# Patient Record
Sex: MALE | Race: WHITE | NOT HISPANIC OR LATINO | Employment: OTHER | URBAN - METROPOLITAN AREA
[De-identification: names, ages, dates, MRNs, and addresses within clinical notes are randomized per-mention and may not be internally consistent; named-entity substitution may affect disease eponyms.]

---

## 2017-01-20 RX ORDER — CLONAZEPAM 0.5 MG/1
TABLET ORAL
Qty: 60 TABLET | Refills: 1 | OUTPATIENT
Start: 2017-01-20 | End: 2017-10-05 | Stop reason: SDUPTHER

## 2017-02-14 RX ORDER — AMLODIPINE BESYLATE 5 MG/1
TABLET ORAL
Qty: 30 TABLET | Refills: 5 | Status: SHIPPED | OUTPATIENT
Start: 2017-02-14 | End: 2017-08-28 | Stop reason: SDUPTHER

## 2017-03-16 RX ORDER — ACLIDINIUM BROMIDE 400 UG/1
POWDER, METERED RESPIRATORY (INHALATION)
Qty: 1 EACH | Refills: 3 | Status: SHIPPED | OUTPATIENT
Start: 2017-03-16 | End: 2017-07-28 | Stop reason: SDUPTHER

## 2017-08-28 RX ORDER — AMLODIPINE BESYLATE 5 MG/1
TABLET ORAL
Qty: 30 TABLET | Refills: 5 | Status: SHIPPED | OUTPATIENT
Start: 2017-08-28 | End: 2018-06-11 | Stop reason: SDUPTHER

## 2017-10-05 RX ORDER — CLONAZEPAM 0.5 MG/1
TABLET ORAL
Qty: 60 TABLET | Refills: 1 | OUTPATIENT
Start: 2017-10-05 | End: 2018-02-21 | Stop reason: SDUPTHER

## 2017-10-11 ENCOUNTER — OFFICE VISIT (OUTPATIENT)
Dept: INTERNAL MEDICINE | Facility: CLINIC | Age: 82
End: 2017-10-11

## 2017-10-11 VITALS
TEMPERATURE: 99.4 F | DIASTOLIC BLOOD PRESSURE: 50 MMHG | OXYGEN SATURATION: 88 % | HEART RATE: 79 BPM | HEIGHT: 69 IN | SYSTOLIC BLOOD PRESSURE: 100 MMHG

## 2017-10-11 DIAGNOSIS — G47.00 INSOMNIA, UNSPECIFIED TYPE: ICD-10-CM

## 2017-10-11 DIAGNOSIS — K57.30 DIVERTICULOSIS OF LARGE INTESTINE WITHOUT HEMORRHAGE: ICD-10-CM

## 2017-10-11 DIAGNOSIS — F41.9 ANXIETY: ICD-10-CM

## 2017-10-11 DIAGNOSIS — I10 ESSENTIAL HYPERTENSION: ICD-10-CM

## 2017-10-11 DIAGNOSIS — N40.0 ENLARGED PROSTATE WITHOUT LOWER URINARY TRACT SYMPTOMS (LUTS): ICD-10-CM

## 2017-10-11 DIAGNOSIS — M15.9 GENERALIZED OSTEOARTHRITIS: ICD-10-CM

## 2017-10-11 DIAGNOSIS — N18.30 CHRONIC KIDNEY DISEASE, STAGE III (MODERATE) (HCC): ICD-10-CM

## 2017-10-11 DIAGNOSIS — J43.8 OTHER EMPHYSEMA (HCC): ICD-10-CM

## 2017-10-11 DIAGNOSIS — J18.9 COMMUNITY ACQUIRED PNEUMONIA OF LEFT LOWER LOBE OF LUNG: ICD-10-CM

## 2017-10-11 DIAGNOSIS — D36.9 TUBULAR ADENOMA: ICD-10-CM

## 2017-10-11 DIAGNOSIS — E78.49 OTHER HYPERLIPIDEMIA: ICD-10-CM

## 2017-10-11 DIAGNOSIS — I25.10 ATHEROSCLEROSIS OF NATIVE CORONARY ARTERY OF NATIVE HEART WITHOUT ANGINA PECTORIS: Primary | ICD-10-CM

## 2017-10-11 PROCEDURE — 99214 OFFICE O/P EST MOD 30 MIN: CPT | Performed by: INTERNAL MEDICINE

## 2017-10-11 RX ORDER — DOXYCYCLINE HYCLATE 100 MG/1
100 CAPSULE ORAL 2 TIMES DAILY
Qty: 14 CAPSULE | Refills: 0 | Status: SHIPPED | OUTPATIENT
Start: 2017-10-11 | End: 2018-01-04

## 2017-10-11 NOTE — PROGRESS NOTES
Patient is a 81 y.o. male who is here for cough and SOB.  Chief Complaint   Patient presents with   • Cough   • Shortness of Breath         HPI:  Patient c/o 2 day hx of cough and congestion.  Some yellow expectoration.  Feels sob.  No pleuritic pain.  No sore throat.  No recent antibiotics.  Some nausea.  No diarrhea.  No HA's or ear pains.    History:    Patient Active Problem List   Diagnosis   • Anxiety   • Aortic aneurysm   • Atherosclerotic heart disease of native coronary artery without angina pectoris   • Chronic obstructive pulmonary disease   • Chronic kidney disease, stage III (moderate)   • Diverticulosis of large intestine   • Enlarged prostate without lower urinary tract symptoms (luts)   • Generalized osteoarthritis   • Hyperlipidemia   • Hypertension   • Insomnia   • Neuropathy   • PVD (peripheral vascular disease)   • Tubular adenoma   • Absence of kidney   • Arthralgia of hip       Past Medical History:   Diagnosis Date   • Colon polyps    • Ecchymosis    • Heart attack        Past Surgical History:   Procedure Laterality Date   • ROTATOR CUFF REPAIR Right 2008   • TONSILLECTOMY         Current Outpatient Prescriptions on File Prior to Visit   Medication Sig   • aclidinium bromide (TUDORZA PRESSAIR) 400 MCG/ACT aerosol powder  powder for inhalation Inhale 1 puff 2 (Two) Times a Day.   • amLODIPine (NORVASC) 5 MG tablet take 1 tablet by mouth once daily   • aspirin 81 MG tablet Take  by mouth Daily.   • carvedilol (COREG) 12.5 MG tablet 2 (Two) Times a Day.   • clonazePAM (KlonoPIN) 0.5 MG tablet 1-2 po qhs prn   • CRESTOR 20 MG tablet Every Night.   • DULoxetine (CYMBALTA) 60 MG capsule Daily.   • dutasteride (AVODART) 0.5 MG capsule Daily.   • fluticasone (FLONASE) 50 MCG/ACT nasal spray instill 1 spray into each nostril twice a day   • paricalcitol (ZEMPLAR) 1 MCG capsule    • sulfaSALAzine (AZULFIDINE) 500 MG tablet 2 (Two) Times a Day.   • tamsulosin (FLOMAX) 0.4 MG capsule 24 hr capsule 2  (Two) Times a Day.   • zolpidem CR (AMBIEN CR) 12.5 MG CR tablet Daily.     No current facility-administered medications on file prior to visit.        Family History   Problem Relation Age of Onset   • Cancer Other    • Heart attack Other    • Hypertension Other    • Hyperlipidemia Other        Social History     Social History   • Marital status:      Spouse name: N/A   • Number of children: N/A   • Years of education: N/A     Occupational History   • Not on file.     Social History Main Topics   • Smoking status: Former Smoker   • Smokeless tobacco: Not on file   • Alcohol use Not on file   • Drug use: Not on file   • Sexual activity: Not on file     Other Topics Concern   • Not on file     Social History Narrative         ROS:    Review of Systems   Constitutional: Positive for fatigue, fever and unexpected weight change. Negative for chills and diaphoresis.   HENT: Negative for congestion, ear pain, hearing loss, nosebleeds, postnasal drip, sinus pressure and sore throat.    Eyes: Negative for pain, discharge and itching.   Respiratory: Positive for cough and shortness of breath. Negative for chest tightness and wheezing.    Cardiovascular: Negative for chest pain, palpitations and leg swelling.   Gastrointestinal: Negative for abdominal distention, abdominal pain, blood in stool, constipation, diarrhea, nausea and vomiting.        5/15 colonoscopy with TA   Endocrine: Negative for polydipsia and polyuria.   Genitourinary: Negative for difficulty urinating, dysuria, frequency and hematuria.        Followed by urology   Musculoskeletal: Positive for arthralgias and back pain. Negative for gait problem, joint swelling and myalgias.   Skin: Negative for rash and wound.   Neurological: Positive for weakness. Negative for dizziness, syncope and headaches.   Psychiatric/Behavioral: Positive for sleep disturbance. Negative for dysphoric mood. The patient is not nervous/anxious.        /50  Pulse 79   "Temp 99.4 °F (37.4 °C) (Temporal Artery )   Ht 69\" (175.3 cm)  SpO2 (!) 88% Comment: RA    Physical Exam:    Physical Exam   Constitutional: He is oriented to person, place, and time. He appears well-developed and well-nourished.   HENT:   Head: Normocephalic and atraumatic.   Right Ear: External ear normal.   Left Ear: External ear normal.   Mouth/Throat: Oropharynx is clear and moist.   Eyes: Conjunctivae and EOM are normal.   Neck: Normal range of motion. Neck supple.   Cardiovascular: Normal rate, regular rhythm and normal heart sounds.    Pulmonary/Chest: Effort normal.   Diminished BS  LL rales   Abdominal: Soft. Bowel sounds are normal.   Musculoskeletal: Normal range of motion.   Lymphadenopathy:     He has no cervical adenopathy.   Neurological: He is alert and oriented to person, place, and time.   Skin: Skin is warm and dry.   Psychiatric: He has a normal mood and affect. His behavior is normal. Thought content normal.       Procedure:      Discussion/Summary:  htn-cut in 1/2 amlodipine  cri-stable  copd-stable   hyperlipidemia-labs soon, counseled on diet  bph-stable  diverticulosis-citrucel qd  TA-colonoscopy noted, needs scope 5/18  cad/pvd-cont rf mod  insomnia-change to klonopine qhs prn, advised of risk  djd/neuropathy-improved with cymbalta, rf norco, counseled on risk of addiction  left hip pain-improved, MRI noted  Wt gain-still a problem  CAP-doxy/mucinex, call if not better      labs noted and dw patient, counseled on diet      Current Outpatient Prescriptions:   •  aclidinium bromide (TUDORZA PRESSAIR) 400 MCG/ACT aerosol powder  powder for inhalation, Inhale 1 puff 2 (Two) Times a Day., Disp: 1 each, Rfl: 3  •  amLODIPine (NORVASC) 5 MG tablet, take 1 tablet by mouth once daily, Disp: 30 tablet, Rfl: 5  •  aspirin 81 MG tablet, Take  by mouth Daily., Disp: , Rfl:   •  carvedilol (COREG) 12.5 MG tablet, 2 (Two) Times a Day., Disp: , Rfl: 0  •  clonazePAM (KlonoPIN) 0.5 MG tablet, 1-2 po qhs " prn, Disp: 60 tablet, Rfl: 1  •  CRESTOR 20 MG tablet, Every Night., Disp: , Rfl: 0  •  DULoxetine (CYMBALTA) 60 MG capsule, Daily., Disp: , Rfl: 0  •  dutasteride (AVODART) 0.5 MG capsule, Daily., Disp: , Rfl: 0  •  fluticasone (FLONASE) 50 MCG/ACT nasal spray, instill 1 spray into each nostril twice a day, Disp: 1 bottle, Rfl: 3  •  paricalcitol (ZEMPLAR) 1 MCG capsule, , Disp: , Rfl: 0  •  sulfaSALAzine (AZULFIDINE) 500 MG tablet, 2 (Two) Times a Day., Disp: , Rfl: 0  •  tamsulosin (FLOMAX) 0.4 MG capsule 24 hr capsule, 2 (Two) Times a Day., Disp: , Rfl: 0  •  zolpidem CR (AMBIEN CR) 12.5 MG CR tablet, Daily., Disp: , Rfl: 0  •  doxycycline (VIBRAMYCIN) 100 MG capsule, Take 1 capsule by mouth 2 (Two) Times a Day., Disp: 14 capsule, Rfl: 0        Rylan was seen today for cough and shortness of breath.    Diagnoses and all orders for this visit:    Atherosclerosis of native coronary artery of native heart without angina pectoris    Other hyperlipidemia    Essential hypertension    Other emphysema    Diverticulosis of large intestine without hemorrhage    Generalized osteoarthritis    Chronic kidney disease, stage III (moderate)    Enlarged prostate without lower urinary tract symptoms (luts)    Anxiety    Insomnia, unspecified type    Tubular adenoma    Community acquired pneumonia of left lower lobe of lung  -     doxycycline (VIBRAMYCIN) 100 MG capsule; Take 1 capsule by mouth 2 (Two) Times a Day.

## 2017-10-16 ENCOUNTER — OFFICE VISIT (OUTPATIENT)
Dept: INTERNAL MEDICINE | Facility: CLINIC | Age: 82
End: 2017-10-16

## 2017-10-16 VITALS
SYSTOLIC BLOOD PRESSURE: 120 MMHG | DIASTOLIC BLOOD PRESSURE: 68 MMHG | BODY MASS INDEX: 26.51 KG/M2 | HEART RATE: 64 BPM | HEIGHT: 69 IN | WEIGHT: 179 LBS

## 2017-10-16 DIAGNOSIS — M15.9 GENERALIZED OSTEOARTHRITIS: ICD-10-CM

## 2017-10-16 DIAGNOSIS — D36.9 TUBULAR ADENOMA: ICD-10-CM

## 2017-10-16 DIAGNOSIS — N18.30 CHRONIC KIDNEY DISEASE, STAGE III (MODERATE) (HCC): ICD-10-CM

## 2017-10-16 DIAGNOSIS — I73.9 PVD (PERIPHERAL VASCULAR DISEASE) (HCC): ICD-10-CM

## 2017-10-16 DIAGNOSIS — K57.30 DIVERTICULOSIS OF LARGE INTESTINE WITHOUT HEMORRHAGE: ICD-10-CM

## 2017-10-16 DIAGNOSIS — I71.9 AORTIC ANEURYSM WITHOUT RUPTURE, UNSPECIFIED PORTION OF AORTA (HCC): Primary | ICD-10-CM

## 2017-10-16 DIAGNOSIS — F41.9 ANXIETY: ICD-10-CM

## 2017-10-16 DIAGNOSIS — N40.0 ENLARGED PROSTATE WITHOUT LOWER URINARY TRACT SYMPTOMS (LUTS): ICD-10-CM

## 2017-10-16 DIAGNOSIS — G47.00 INSOMNIA, UNSPECIFIED TYPE: ICD-10-CM

## 2017-10-16 DIAGNOSIS — I25.10 ATHEROSCLEROSIS OF NATIVE CORONARY ARTERY OF NATIVE HEART WITHOUT ANGINA PECTORIS: ICD-10-CM

## 2017-10-16 DIAGNOSIS — J43.8 OTHER EMPHYSEMA (HCC): ICD-10-CM

## 2017-10-16 DIAGNOSIS — E78.49 OTHER HYPERLIPIDEMIA: ICD-10-CM

## 2017-10-16 DIAGNOSIS — I10 ESSENTIAL HYPERTENSION: ICD-10-CM

## 2017-10-16 PROCEDURE — 99214 OFFICE O/P EST MOD 30 MIN: CPT | Performed by: INTERNAL MEDICINE

## 2017-10-16 NOTE — PROGRESS NOTES
Patient is a 81 y.o. male who is here for a follow up of pneumonia.  Chief Complaint   Patient presents with   • Pneumonia         HPI:  Here for f/u.  Breathing is better.  No fever or chills.  Cough is much better.  Still fatigued.  No GI issues from the antibiotics.  Sleep is not the best.  No abdominal pains.  No pain on deep inspiration.  No sore throat.     History:    Patient Active Problem List   Diagnosis   • Anxiety   • Aortic aneurysm   • Atherosclerotic heart disease of native coronary artery without angina pectoris   • Chronic obstructive pulmonary disease   • Chronic kidney disease, stage III (moderate)   • Diverticulosis of large intestine   • Enlarged prostate without lower urinary tract symptoms (luts)   • Generalized osteoarthritis   • Hyperlipidemia   • Hypertension   • Insomnia   • Neuropathy   • PVD (peripheral vascular disease)   • Tubular adenoma   • Absence of kidney   • Arthralgia of hip       Past Medical History:   Diagnosis Date   • Colon polyps    • Ecchymosis    • Heart attack        Past Surgical History:   Procedure Laterality Date   • ROTATOR CUFF REPAIR Right 2008   • TONSILLECTOMY         Current Outpatient Prescriptions on File Prior to Visit   Medication Sig   • aclidinium bromide (TUDORZA PRESSAIR) 400 MCG/ACT aerosol powder  powder for inhalation Inhale 1 puff 2 (Two) Times a Day.   • amLODIPine (NORVASC) 5 MG tablet take 1 tablet by mouth once daily   • aspirin 81 MG tablet Take  by mouth Daily.   • carvedilol (COREG) 12.5 MG tablet 2 (Two) Times a Day.   • clonazePAM (KlonoPIN) 0.5 MG tablet 1-2 po qhs prn   • CRESTOR 20 MG tablet Every Night.   • doxycycline (VIBRAMYCIN) 100 MG capsule Take 1 capsule by mouth 2 (Two) Times a Day.   • DULoxetine (CYMBALTA) 60 MG capsule Daily.   • dutasteride (AVODART) 0.5 MG capsule Daily.   • fluticasone (FLONASE) 50 MCG/ACT nasal spray instill 1 spray into each nostril twice a day   • paricalcitol (ZEMPLAR) 1 MCG capsule    • sulfaSALAzine  (AZULFIDINE) 500 MG tablet 2 (Two) Times a Day.   • tamsulosin (FLOMAX) 0.4 MG capsule 24 hr capsule 2 (Two) Times a Day.   • zolpidem CR (AMBIEN CR) 12.5 MG CR tablet Daily.     No current facility-administered medications on file prior to visit.        Family History   Problem Relation Age of Onset   • Cancer Other    • Heart attack Other    • Hypertension Other    • Hyperlipidemia Other        Social History     Social History   • Marital status:      Spouse name: N/A   • Number of children: N/A   • Years of education: N/A     Occupational History   • Not on file.     Social History Main Topics   • Smoking status: Former Smoker   • Smokeless tobacco: Not on file   • Alcohol use Not on file   • Drug use: Not on file   • Sexual activity: Not on file     Other Topics Concern   • Not on file     Social History Narrative         ROS:    Review of Systems   Constitutional: Positive for fatigue. Negative for chills, diaphoresis, fever and unexpected weight change.   HENT: Negative for congestion, ear pain, hearing loss, nosebleeds, postnasal drip, sinus pressure and sore throat.    Eyes: Negative for pain, discharge and itching.   Respiratory: Positive for cough and shortness of breath. Negative for chest tightness and wheezing.    Cardiovascular: Negative for chest pain, palpitations and leg swelling.   Gastrointestinal: Negative for abdominal distention, abdominal pain, blood in stool, constipation, diarrhea, nausea and vomiting.        5/15 colonoscopy with TA   Endocrine: Negative for polydipsia and polyuria.   Genitourinary: Negative for difficulty urinating, dysuria, frequency and hematuria.        Followed by urology   Musculoskeletal: Positive for arthralgias and back pain. Negative for gait problem, joint swelling and myalgias.   Skin: Negative for rash and wound.   Neurological: Positive for weakness. Negative for dizziness, syncope and headaches.   Psychiatric/Behavioral: Positive for sleep  "disturbance. Negative for dysphoric mood. The patient is not nervous/anxious.        /68 (BP Location: Left arm, Patient Position: Sitting)  Pulse 64  Ht 69\" (175.3 cm)  Wt 179 lb (81.2 kg)  BMI 26.43 kg/m2    Physical Exam:    Physical Exam   Constitutional: He is oriented to person, place, and time. He appears well-developed and well-nourished.   HENT:   Head: Normocephalic and atraumatic.   Right Ear: External ear normal.   Left Ear: External ear normal.   Mouth/Throat: Oropharynx is clear and moist.   Eyes: Conjunctivae and EOM are normal.   Neck: Normal range of motion. Neck supple.   Cardiovascular: Normal rate, regular rhythm and normal heart sounds.    Pulmonary/Chest: Effort normal.   Diminished BS   Abdominal: Soft. Bowel sounds are normal.   Musculoskeletal: Normal range of motion.   Lymphadenopathy:     He has no cervical adenopathy.   Neurological: He is alert and oriented to person, place, and time.   Skin: Skin is warm and dry.   Psychiatric: He has a normal mood and affect. His behavior is normal. Thought content normal.       Procedure:      Discussion/Summary:  htn-cont 1/2 amlodipine  cri-stable  copd-stable   hyperlipidemia-labs soon, counseled on diet  bph-stable  diverticulosis-citrucel qd  TA-colonoscopy noted, needs scope 5/18  cad/pvd-cont rf mod  insomnia-change to klonopine qhs prn, advised of risk  djd/neuropathy-improved with cymbalta, rf norco, counseled on risk of addiction  left hip pain-improved, MRI noted  Wt gain-still a problem  CAP-doxy/mucinex continuation      labs noted and dw patient, counseled on diet      Current Outpatient Prescriptions:   •  aclidinium bromide (TUDORZA PRESSAIR) 400 MCG/ACT aerosol powder  powder for inhalation, Inhale 1 puff 2 (Two) Times a Day., Disp: 1 each, Rfl: 3  •  amLODIPine (NORVASC) 5 MG tablet, take 1 tablet by mouth once daily, Disp: 30 tablet, Rfl: 5  •  aspirin 81 MG tablet, Take  by mouth Daily., Disp: , Rfl:   •  carvedilol " (COREG) 12.5 MG tablet, 2 (Two) Times a Day., Disp: , Rfl: 0  •  clonazePAM (KlonoPIN) 0.5 MG tablet, 1-2 po qhs prn, Disp: 60 tablet, Rfl: 1  •  CRESTOR 20 MG tablet, Every Night., Disp: , Rfl: 0  •  doxycycline (VIBRAMYCIN) 100 MG capsule, Take 1 capsule by mouth 2 (Two) Times a Day., Disp: 14 capsule, Rfl: 0  •  DULoxetine (CYMBALTA) 60 MG capsule, Daily., Disp: , Rfl: 0  •  dutasteride (AVODART) 0.5 MG capsule, Daily., Disp: , Rfl: 0  •  fluticasone (FLONASE) 50 MCG/ACT nasal spray, instill 1 spray into each nostril twice a day, Disp: 1 bottle, Rfl: 3  •  paricalcitol (ZEMPLAR) 1 MCG capsule, , Disp: , Rfl: 0  •  sulfaSALAzine (AZULFIDINE) 500 MG tablet, 2 (Two) Times a Day., Disp: , Rfl: 0  •  tamsulosin (FLOMAX) 0.4 MG capsule 24 hr capsule, 2 (Two) Times a Day., Disp: , Rfl: 0  •  zolpidem CR (AMBIEN CR) 12.5 MG CR tablet, Daily., Disp: , Rfl: 0        Rylan was seen today for pneumonia.    Diagnoses and all orders for this visit:    Aortic aneurysm without rupture, unspecified portion of aorta    Atherosclerosis of native coronary artery of native heart without angina pectoris    Other hyperlipidemia    Essential hypertension    PVD (peripheral vascular disease)    Other emphysema    Diverticulosis of large intestine without hemorrhage    Generalized osteoarthritis    Chronic kidney disease, stage III (moderate)    Enlarged prostate without lower urinary tract symptoms (luts)    Anxiety    Insomnia, unspecified type    Tubular adenoma

## 2017-12-01 RX ORDER — ACLIDINIUM BROMIDE 400 UG/1
POWDER, METERED RESPIRATORY (INHALATION)
Qty: 1 EACH | Refills: 3 | Status: SHIPPED | OUTPATIENT
Start: 2017-12-01 | End: 2018-05-23 | Stop reason: SDUPTHER

## 2018-01-04 ENCOUNTER — OFFICE VISIT (OUTPATIENT)
Dept: INTERNAL MEDICINE | Facility: CLINIC | Age: 83
End: 2018-01-04

## 2018-01-04 ENCOUNTER — TELEPHONE (OUTPATIENT)
Dept: INTERNAL MEDICINE | Facility: CLINIC | Age: 83
End: 2018-01-04

## 2018-01-04 VITALS
BODY MASS INDEX: 26.81 KG/M2 | SYSTOLIC BLOOD PRESSURE: 110 MMHG | HEART RATE: 64 BPM | DIASTOLIC BLOOD PRESSURE: 60 MMHG | HEIGHT: 69 IN | WEIGHT: 181 LBS | TEMPERATURE: 98.1 F

## 2018-01-04 DIAGNOSIS — G47.00 INSOMNIA, UNSPECIFIED TYPE: ICD-10-CM

## 2018-01-04 DIAGNOSIS — F41.9 ANXIETY: ICD-10-CM

## 2018-01-04 DIAGNOSIS — M15.9 GENERALIZED OSTEOARTHRITIS: ICD-10-CM

## 2018-01-04 DIAGNOSIS — M25.559 ARTHRALGIA OF HIP, UNSPECIFIED LATERALITY: ICD-10-CM

## 2018-01-04 DIAGNOSIS — N40.0 ENLARGED PROSTATE WITHOUT LOWER URINARY TRACT SYMPTOMS (LUTS): ICD-10-CM

## 2018-01-04 DIAGNOSIS — I73.9 PVD (PERIPHERAL VASCULAR DISEASE) (HCC): ICD-10-CM

## 2018-01-04 DIAGNOSIS — J18.9 COMMUNITY ACQUIRED PNEUMONIA OF RIGHT LOWER LOBE OF LUNG: ICD-10-CM

## 2018-01-04 DIAGNOSIS — E78.49 OTHER HYPERLIPIDEMIA: ICD-10-CM

## 2018-01-04 DIAGNOSIS — I10 ESSENTIAL HYPERTENSION: ICD-10-CM

## 2018-01-04 DIAGNOSIS — I71.40 ABDOMINAL AORTIC ANEURYSM (AAA) WITHOUT RUPTURE (HCC): Primary | ICD-10-CM

## 2018-01-04 DIAGNOSIS — J43.8 OTHER EMPHYSEMA (HCC): ICD-10-CM

## 2018-01-04 DIAGNOSIS — N18.30 CHRONIC KIDNEY DISEASE, STAGE III (MODERATE) (HCC): ICD-10-CM

## 2018-01-04 DIAGNOSIS — I25.10 ATHEROSCLEROSIS OF NATIVE CORONARY ARTERY OF NATIVE HEART WITHOUT ANGINA PECTORIS: ICD-10-CM

## 2018-01-04 DIAGNOSIS — K57.30 DIVERTICULOSIS OF LARGE INTESTINE WITHOUT HEMORRHAGE: ICD-10-CM

## 2018-01-04 PROCEDURE — 99214 OFFICE O/P EST MOD 30 MIN: CPT | Performed by: INTERNAL MEDICINE

## 2018-01-04 RX ORDER — DOXYCYCLINE HYCLATE 100 MG/1
100 CAPSULE ORAL
Qty: 20 CAPSULE | Refills: 0 | Status: SHIPPED | OUTPATIENT
Start: 2018-01-04 | End: 2018-01-23

## 2018-01-04 NOTE — PROGRESS NOTES
Patient is a 82 y.o. male who is here for a productive cough.  Chief Complaint   Patient presents with   • Cough         HPI:    Here for f/u.  Today c/o 2 weeks of cough with discolored mucous.  No hemoptysis.  Little rhinorrhea.  No fever or chills.  Worst in early am.  No worsening sob. No pleuritic pain.  No nausea or emesis or diarrhea.  No recent antibiotics.     History:    Patient Active Problem List   Diagnosis   • Anxiety   • Aortic aneurysm   • Atherosclerotic heart disease of native coronary artery without angina pectoris   • Chronic obstructive pulmonary disease   • Chronic kidney disease, stage III (moderate)   • Diverticulosis of large intestine   • Enlarged prostate without lower urinary tract symptoms (luts)   • Generalized osteoarthritis   • Hyperlipidemia   • Hypertension   • Insomnia   • Neuropathy   • PVD (peripheral vascular disease)   • Tubular adenoma   • Absence of kidney   • Arthralgia of hip   • Community acquired pneumonia of right lower lobe of lung       Past Medical History:   Diagnosis Date   • Colon polyps    • Ecchymosis    • Heart attack        Past Surgical History:   Procedure Laterality Date   • ROTATOR CUFF REPAIR Right 2008   • TONSILLECTOMY         Current Outpatient Prescriptions on File Prior to Visit   Medication Sig   • amLODIPine (NORVASC) 5 MG tablet take 1 tablet by mouth once daily   • aspirin 81 MG tablet Take  by mouth Daily.   • carvedilol (COREG) 12.5 MG tablet 2 (Two) Times a Day.   • clonazePAM (KlonoPIN) 0.5 MG tablet 1-2 po qhs prn   • CRESTOR 20 MG tablet Every Night.   • DULoxetine (CYMBALTA) 60 MG capsule Daily.   • dutasteride (AVODART) 0.5 MG capsule Daily.   • fluticasone (FLONASE) 50 MCG/ACT nasal spray instill 1 spray into each nostril twice a day   • paricalcitol (ZEMPLAR) 1 MCG capsule    • sulfaSALAzine (AZULFIDINE) 500 MG tablet 2 (Two) Times a Day.   • tamsulosin (FLOMAX) 0.4 MG capsule 24 hr capsule 2 (Two) Times a Day.   • TUDORZA PRESSAIR 400  MCG/ACT aerosol powder  powder for inhalation inhale 1 puff by mouth twice a day   • zolpidem CR (AMBIEN CR) 12.5 MG CR tablet Daily.   • [DISCONTINUED] doxycycline (VIBRAMYCIN) 100 MG capsule Take 1 capsule by mouth 2 (Two) Times a Day.     No current facility-administered medications on file prior to visit.        Family History   Problem Relation Age of Onset   • Cancer Other    • Heart attack Other    • Hypertension Other    • Hyperlipidemia Other        Social History     Social History   • Marital status:      Spouse name: N/A   • Number of children: N/A   • Years of education: N/A     Occupational History   • Not on file.     Social History Main Topics   • Smoking status: Former Smoker   • Smokeless tobacco: Not on file   • Alcohol use Not on file   • Drug use: Not on file   • Sexual activity: Not on file     Other Topics Concern   • Not on file     Social History Narrative         ROS:    Review of Systems   Constitutional: Positive for fatigue. Negative for chills, diaphoresis, fever and unexpected weight change.   HENT: Negative for congestion, ear pain, hearing loss, nosebleeds, postnasal drip, sinus pressure and sore throat.    Eyes: Negative for pain, discharge and itching.   Respiratory: Positive for cough and shortness of breath. Negative for chest tightness and wheezing.    Cardiovascular: Negative for chest pain, palpitations and leg swelling.   Gastrointestinal: Negative for abdominal distention, abdominal pain, blood in stool, constipation, diarrhea, nausea and vomiting.        5/15 colonoscopy with TA   Endocrine: Negative for polydipsia and polyuria.   Genitourinary: Negative for difficulty urinating, dysuria, frequency and hematuria.        Followed by urology   Musculoskeletal: Positive for arthralgias and back pain. Negative for gait problem, joint swelling and myalgias.   Skin: Negative for rash and wound.   Neurological: Positive for weakness. Negative for dizziness, syncope and  "headaches.   Psychiatric/Behavioral: Positive for sleep disturbance. Negative for dysphoric mood. The patient is not nervous/anxious.        /60  Pulse 64  Temp 98.1 °F (36.7 °C) (Temporal Artery )   Ht 175.3 cm (69.02\")  Wt 82.1 kg (181 lb)  BMI 26.72 kg/m2    Physical Exam:    Physical Exam   Constitutional: He is oriented to person, place, and time. He appears well-developed and well-nourished.   HENT:   Head: Normocephalic and atraumatic.   Right Ear: External ear normal.   Left Ear: External ear normal.   Mouth/Throat: Oropharynx is clear and moist.   Eyes: Conjunctivae and EOM are normal.   Neck: Normal range of motion. Neck supple.   Cardiovascular: Normal rate, regular rhythm and normal heart sounds.    Pulmonary/Chest: Effort normal. He has rales ( RLL).   Diminished BS   Abdominal: Soft. Bowel sounds are normal.   Musculoskeletal: Normal range of motion.   Lymphadenopathy:     He has no cervical adenopathy.   Neurological: He is alert and oriented to person, place, and time.   Skin: Skin is warm and dry.   Psychiatric: He has a normal mood and affect. His behavior is normal. Thought content normal.       Procedure:      Discussion/Summary:    htn-cont 1/2 amlodipine  cri-stable  copd-stable   hyperlipidemia-labs soon, counseled on diet  bph-stable  diverticulosis-citrucel qd  TA-colonoscopy noted, needs scope 5/18  cad/pvd-cont rf mod  insomnia-change to klonopine qhs prn, advised of risk  djd/neuropathy-improved with cymbalta, rf norco, counseled on risk of addiction  left hip pain-improved, MRI noted  Wt gain-still a problem  CAP-doxy/mucinex      labs noted and dw patient, counseled on diet    Current Outpatient Prescriptions:   •  amLODIPine (NORVASC) 5 MG tablet, take 1 tablet by mouth once daily, Disp: 30 tablet, Rfl: 5  •  aspirin 81 MG tablet, Take  by mouth Daily., Disp: , Rfl:   •  carvedilol (COREG) 12.5 MG tablet, 2 (Two) Times a Day., Disp: , Rfl: 0  •  clonazePAM (KlonoPIN) 0.5 MG " tablet, 1-2 po qhs prn, Disp: 60 tablet, Rfl: 1  •  CRESTOR 20 MG tablet, Every Night., Disp: , Rfl: 0  •  DULoxetine (CYMBALTA) 60 MG capsule, Daily., Disp: , Rfl: 0  •  dutasteride (AVODART) 0.5 MG capsule, Daily., Disp: , Rfl: 0  •  fluticasone (FLONASE) 50 MCG/ACT nasal spray, instill 1 spray into each nostril twice a day, Disp: 1 bottle, Rfl: 3  •  paricalcitol (ZEMPLAR) 1 MCG capsule, , Disp: , Rfl: 0  •  sulfaSALAzine (AZULFIDINE) 500 MG tablet, 2 (Two) Times a Day., Disp: , Rfl: 0  •  tamsulosin (FLOMAX) 0.4 MG capsule 24 hr capsule, 2 (Two) Times a Day., Disp: , Rfl: 0  •  TUDORZA PRESSAIR 400 MCG/ACT aerosol powder  powder for inhalation, inhale 1 puff by mouth twice a day, Disp: 1 each, Rfl: 3  •  VOLTAREN 1 % gel gel, apply 2 grams to AFFECTED FINGER three times a day if needed, Disp: , Rfl: 0  •  zolpidem CR (AMBIEN CR) 12.5 MG CR tablet, Daily., Disp: , Rfl: 0  •  doxycycline (VIBRAMYCIN) 100 MG capsule, Take 1 capsule by mouth 2 (Two) Times a Day., Disp: 20 capsule, Rfl: 0        Rylan was seen today for cough.    Diagnoses and all orders for this visit:    Abdominal aortic aneurysm (AAA) without rupture    Atherosclerosis of native coronary artery of native heart without angina pectoris    Other hyperlipidemia    Essential hypertension    PVD (peripheral vascular disease)    Other emphysema    Diverticulosis of large intestine without hemorrhage    Arthralgia of hip, unspecified laterality    Generalized osteoarthritis    Chronic kidney disease, stage III (moderate)    Enlarged prostate without lower urinary tract symptoms (luts)    Anxiety    Insomnia, unspecified type    Community acquired pneumonia of right lower lobe of lung  -     doxycycline (VIBRAMYCIN) 100 MG capsule; Take 1 capsule by mouth 2 (Two) Times a Day.

## 2018-01-04 NOTE — TELEPHONE ENCOUNTER
ASAEL AVILES WANTS TO KNOW IF HE SHOULD BE ON ANTIBIOTICS. HIS MUCUS IS STILL DISCOLORED IN HIS CHEST. PLEASE CALL HIM [phone]

## 2018-01-23 ENCOUNTER — OFFICE VISIT (OUTPATIENT)
Dept: INTERNAL MEDICINE | Facility: CLINIC | Age: 83
End: 2018-01-23

## 2018-01-23 VITALS
WEIGHT: 182 LBS | HEART RATE: 60 BPM | DIASTOLIC BLOOD PRESSURE: 60 MMHG | HEIGHT: 69 IN | SYSTOLIC BLOOD PRESSURE: 132 MMHG | BODY MASS INDEX: 26.96 KG/M2

## 2018-01-23 DIAGNOSIS — F41.9 ANXIETY: ICD-10-CM

## 2018-01-23 DIAGNOSIS — Z23 NEED FOR PROPHYLACTIC VACCINATION AGAINST STREPTOCOCCUS PNEUMONIAE (PNEUMOCOCCUS): ICD-10-CM

## 2018-01-23 DIAGNOSIS — I73.9 PVD (PERIPHERAL VASCULAR DISEASE) (HCC): ICD-10-CM

## 2018-01-23 DIAGNOSIS — E78.49 OTHER HYPERLIPIDEMIA: ICD-10-CM

## 2018-01-23 DIAGNOSIS — G62.9 NEUROPATHY: ICD-10-CM

## 2018-01-23 DIAGNOSIS — M15.9 GENERALIZED OSTEOARTHRITIS: ICD-10-CM

## 2018-01-23 DIAGNOSIS — I10 ESSENTIAL HYPERTENSION: ICD-10-CM

## 2018-01-23 DIAGNOSIS — I25.10 ATHEROSCLEROSIS OF NATIVE CORONARY ARTERY OF NATIVE HEART WITHOUT ANGINA PECTORIS: Primary | ICD-10-CM

## 2018-01-23 DIAGNOSIS — G47.00 INSOMNIA, UNSPECIFIED TYPE: ICD-10-CM

## 2018-01-23 DIAGNOSIS — M25.559 ARTHRALGIA OF HIP, UNSPECIFIED LATERALITY: ICD-10-CM

## 2018-01-23 DIAGNOSIS — K57.30 DIVERTICULOSIS OF LARGE INTESTINE WITHOUT HEMORRHAGE: ICD-10-CM

## 2018-01-23 DIAGNOSIS — N18.30 CHRONIC KIDNEY DISEASE, STAGE III (MODERATE) (HCC): ICD-10-CM

## 2018-01-23 DIAGNOSIS — N40.0 ENLARGED PROSTATE WITHOUT LOWER URINARY TRACT SYMPTOMS (LUTS): ICD-10-CM

## 2018-01-23 DIAGNOSIS — J43.8 OTHER EMPHYSEMA (HCC): ICD-10-CM

## 2018-01-23 PROBLEM — J18.9 COMMUNITY ACQUIRED PNEUMONIA OF RIGHT LOWER LOBE OF LUNG: Status: RESOLVED | Noted: 2018-01-04 | Resolved: 2018-01-23

## 2018-01-23 LAB
ALBUMIN SERPL-MCNC: 4.2 G/DL (ref 3.2–4.8)
ALBUMIN/GLOB SERPL: 1.6 G/DL (ref 1.5–2.5)
ALP SERPL-CCNC: 65 U/L (ref 25–100)
ALT SERPL-CCNC: 25 U/L (ref 7–40)
AST SERPL-CCNC: 27 U/L (ref 0–33)
BILIRUB SERPL-MCNC: 0.5 MG/DL (ref 0.3–1.2)
BUN SERPL-MCNC: 28 MG/DL (ref 9–23)
BUN/CREAT SERPL: 20 (ref 7–25)
CALCIUM SERPL-MCNC: 9.1 MG/DL (ref 8.7–10.4)
CHLORIDE SERPL-SCNC: 109 MMOL/L (ref 99–109)
CHOLEST SERPL-MCNC: 157 MG/DL (ref 0–200)
CO2 SERPL-SCNC: 25 MMOL/L (ref 20–31)
CREAT SERPL-MCNC: 1.4 MG/DL (ref 0.6–1.3)
ERYTHROCYTE [DISTWIDTH] IN BLOOD BY AUTOMATED COUNT: 14.5 % (ref 11.3–14.5)
GLOBULIN SER CALC-MCNC: 2.6 GM/DL
GLUCOSE SERPL-MCNC: 94 MG/DL (ref 70–100)
HCT VFR BLD AUTO: 44 % (ref 38.9–50.9)
HDLC SERPL-MCNC: 42 MG/DL (ref 40–60)
HGB BLD-MCNC: 13.7 G/DL (ref 13.1–17.5)
LDLC SERPL CALC-MCNC: 79 MG/DL (ref 0–100)
MCH RBC QN AUTO: 29.8 PG (ref 27–31)
MCHC RBC AUTO-ENTMCNC: 31.1 G/DL (ref 32–36)
MCV RBC AUTO: 95.7 FL (ref 80–99)
PLATELET # BLD AUTO: 224 10*3/MM3 (ref 150–450)
POTASSIUM SERPL-SCNC: 4.5 MMOL/L (ref 3.5–5.5)
PROT SERPL-MCNC: 6.8 G/DL (ref 5.7–8.2)
RBC # BLD AUTO: 4.6 10*6/MM3 (ref 4.2–5.76)
SODIUM SERPL-SCNC: 139 MMOL/L (ref 132–146)
TRIGL SERPL-MCNC: 180 MG/DL (ref 0–150)
TSH SERPL DL<=0.005 MIU/L-ACNC: 0.78 MIU/ML (ref 0.35–5.35)
VLDLC SERPL CALC-MCNC: 36 MG/DL
WBC # BLD AUTO: 7.21 10*3/MM3 (ref 3.5–10.8)

## 2018-01-23 PROCEDURE — 90732 PPSV23 VACC 2 YRS+ SUBQ/IM: CPT | Performed by: INTERNAL MEDICINE

## 2018-01-23 PROCEDURE — G0009 ADMIN PNEUMOCOCCAL VACCINE: HCPCS | Performed by: INTERNAL MEDICINE

## 2018-01-23 PROCEDURE — 99214 OFFICE O/P EST MOD 30 MIN: CPT | Performed by: INTERNAL MEDICINE

## 2018-01-23 NOTE — PROGRESS NOTES
Patient is a 82 y.o. male who is here for a follow up of chronic conditions.  Chief Complaint   Patient presents with   • Hypertension   • Hyperlipidemia         HPI:    Here for f/u.  Cough and congestion is better.  No problems with antibiotics.  BP is good.  No dizziness or lightheadedness.  Energy level is not the best.  Hip pain is improved.  No nocturia.    History:    Patient Active Problem List   Diagnosis   • Anxiety   • Aortic aneurysm   • Atherosclerotic heart disease of native coronary artery without angina pectoris   • Chronic obstructive pulmonary disease   • Chronic kidney disease, stage III (moderate)   • Diverticulosis of large intestine   • Enlarged prostate without lower urinary tract symptoms (luts)   • Generalized osteoarthritis   • Hyperlipidemia   • Hypertension   • Insomnia   • Neuropathy   • PVD (peripheral vascular disease)   • Tubular adenoma   • Absence of kidney   • Arthralgia of hip       Past Medical History:   Diagnosis Date   • Colon polyps    • Ecchymosis    • Heart attack        Past Surgical History:   Procedure Laterality Date   • ROTATOR CUFF REPAIR Right 2008   • TONSILLECTOMY         Current Outpatient Prescriptions on File Prior to Visit   Medication Sig   • amLODIPine (NORVASC) 5 MG tablet take 1 tablet by mouth once daily   • aspirin 81 MG tablet Take  by mouth Daily.   • carvedilol (COREG) 12.5 MG tablet 2 (Two) Times a Day.   • clonazePAM (KlonoPIN) 0.5 MG tablet 1-2 po qhs prn   • CRESTOR 20 MG tablet Every Night.   • DULoxetine (CYMBALTA) 60 MG capsule Daily.   • dutasteride (AVODART) 0.5 MG capsule Daily.   • fluticasone (FLONASE) 50 MCG/ACT nasal spray instill 1 spray into each nostril twice a day   • paricalcitol (ZEMPLAR) 1 MCG capsule    • sulfaSALAzine (AZULFIDINE) 500 MG tablet 2 (Two) Times a Day.   • tamsulosin (FLOMAX) 0.4 MG capsule 24 hr capsule 2 (Two) Times a Day.   • TUDORZA PRESSAIR 400 MCG/ACT aerosol powder  powder for inhalation inhale 1 puff by mouth  twice a day   • VOLTAREN 1 % gel gel apply 2 grams to AFFECTED FINGER three times a day if needed   • zolpidem CR (AMBIEN CR) 12.5 MG CR tablet Daily.   • [DISCONTINUED] doxycycline (VIBRAMYCIN) 100 MG capsule Take 1 capsule by mouth 2 (Two) Times a Day.     No current facility-administered medications on file prior to visit.        Family History   Problem Relation Age of Onset   • Cancer Other    • Heart attack Other    • Hypertension Other    • Hyperlipidemia Other        Social History     Social History   • Marital status:      Spouse name: N/A   • Number of children: N/A   • Years of education: N/A     Occupational History   • Not on file.     Social History Main Topics   • Smoking status: Former Smoker   • Smokeless tobacco: Not on file   • Alcohol use Not on file   • Drug use: Not on file   • Sexual activity: Not on file     Other Topics Concern   • Not on file     Social History Narrative         ROS:    Review of Systems   Constitutional: Positive for fatigue. Negative for chills, diaphoresis, fever and unexpected weight change.   HENT: Negative for congestion, ear pain, hearing loss, nosebleeds, postnasal drip, sinus pressure and sore throat.    Eyes: Negative for pain, discharge and itching.   Respiratory: Negative for cough, chest tightness, shortness of breath and wheezing.    Cardiovascular: Negative for chest pain, palpitations and leg swelling.   Gastrointestinal: Negative for abdominal distention, abdominal pain, blood in stool, constipation, diarrhea, nausea and vomiting.        5/15 colonoscopy with TA   Endocrine: Negative for polydipsia and polyuria.   Genitourinary: Negative for difficulty urinating, dysuria, frequency and hematuria.        Followed by urology   Musculoskeletal: Positive for arthralgias and back pain. Negative for gait problem, joint swelling and myalgias.   Skin: Negative for rash and wound.   Neurological: Positive for weakness. Negative for dizziness, syncope and  "headaches.   Psychiatric/Behavioral: Positive for sleep disturbance. Negative for dysphoric mood. The patient is not nervous/anxious.        /60  Pulse 60  Ht 175.3 cm (69.02\")  Wt 82.6 kg (182 lb)  BMI 26.86 kg/m2    Physical Exam:    Physical Exam   Constitutional: He is oriented to person, place, and time. He appears well-developed and well-nourished.   HENT:   Head: Normocephalic and atraumatic.   Right Ear: External ear normal.   Left Ear: External ear normal.   Mouth/Throat: Oropharynx is clear and moist.   Eyes: Conjunctivae and EOM are normal.   Neck: Normal range of motion. Neck supple.   Cardiovascular: Normal rate, regular rhythm and normal heart sounds.    Pulmonary/Chest: Effort normal and breath sounds normal.   Abdominal: Soft. Bowel sounds are normal.   Musculoskeletal: Normal range of motion.   Lymphadenopathy:     He has no cervical adenopathy.   Neurological: He is alert and oriented to person, place, and time.   Skin: Skin is warm and dry.   Psychiatric: He has a normal mood and affect. His behavior is normal. Thought content normal.       Procedure:      Discussion/Summary:    htn-stable  cri-stable, labs today  copd-stable   hyperlipidemia-labs today, counseled on diet  bph-stable  diverticulosis-citrucel qd  TA-colonoscopy noted, needs scope 5/18  cad/pvd-cont rf mod  insomnia-change to klonopine qhs prn, advised of risk  djd/neuropathy-improved with cymbalta, rf norco, counseled on risk of addiction  left hip pain-improved, MRI noted      labs noted and dw patient, counseled on diet  Pneumovax 23 today  Advised to increase fluids    Current Outpatient Prescriptions:   •  amLODIPine (NORVASC) 5 MG tablet, take 1 tablet by mouth once daily, Disp: 30 tablet, Rfl: 5  •  aspirin 81 MG tablet, Take  by mouth Daily., Disp: , Rfl:   •  carvedilol (COREG) 12.5 MG tablet, 2 (Two) Times a Day., Disp: , Rfl: 0  •  clonazePAM (KlonoPIN) 0.5 MG tablet, 1-2 po qhs prn, Disp: 60 tablet, Rfl: 1  •  " CRESTOR 20 MG tablet, Every Night., Disp: , Rfl: 0  •  DULoxetine (CYMBALTA) 60 MG capsule, Daily., Disp: , Rfl: 0  •  dutasteride (AVODART) 0.5 MG capsule, Daily., Disp: , Rfl: 0  •  fluticasone (FLONASE) 50 MCG/ACT nasal spray, instill 1 spray into each nostril twice a day, Disp: 1 bottle, Rfl: 3  •  paricalcitol (ZEMPLAR) 1 MCG capsule, , Disp: , Rfl: 0  •  sulfaSALAzine (AZULFIDINE) 500 MG tablet, 2 (Two) Times a Day., Disp: , Rfl: 0  •  tamsulosin (FLOMAX) 0.4 MG capsule 24 hr capsule, 2 (Two) Times a Day., Disp: , Rfl: 0  •  TUDORZA PRESSAIR 400 MCG/ACT aerosol powder  powder for inhalation, inhale 1 puff by mouth twice a day, Disp: 1 each, Rfl: 3  •  VOLTAREN 1 % gel gel, apply 2 grams to AFFECTED FINGER three times a day if needed, Disp: , Rfl: 0  •  zolpidem CR (AMBIEN CR) 12.5 MG CR tablet, Daily., Disp: , Rfl: 0        Rylan was seen today for hypertension and hyperlipidemia.    Diagnoses and all orders for this visit:    Atherosclerosis of native coronary artery of native heart without angina pectoris    Other hyperlipidemia  -     Comprehensive Metabolic Panel  -     Lipid Panel  -     TSH    Essential hypertension  -     CBC (No Diff)    PVD (peripheral vascular disease)    Other emphysema    Diverticulosis of large intestine without hemorrhage    Arthralgia of hip, unspecified laterality    Neuropathy    Generalized osteoarthritis    Chronic kidney disease, stage III (moderate)    Enlarged prostate without lower urinary tract symptoms (luts)    Anxiety    Insomnia, unspecified type    Need for prophylactic vaccination against Streptococcus pneumoniae (pneumococcus)  -     Pneumococcal Polysaccharide Vaccine 23-Valent Greater Than or Equal To 3yo Subcutaneous / IM

## 2018-02-21 RX ORDER — CLONAZEPAM 0.5 MG/1
TABLET ORAL
Qty: 60 TABLET | Refills: 1 | OUTPATIENT
Start: 2018-02-21 | End: 2018-07-29 | Stop reason: SDUPTHER

## 2018-05-23 RX ORDER — ACLIDINIUM BROMIDE 400 UG/1
POWDER, METERED RESPIRATORY (INHALATION)
Qty: 1 EACH | Refills: 3 | Status: SHIPPED | OUTPATIENT
Start: 2018-05-23 | End: 2018-09-29 | Stop reason: SDUPTHER

## 2018-06-11 RX ORDER — AMLODIPINE BESYLATE 5 MG/1
TABLET ORAL
Qty: 90 TABLET | Refills: 0 | Status: SHIPPED | OUTPATIENT
Start: 2018-06-11 | End: 2018-12-04 | Stop reason: DRUGHIGH

## 2018-07-12 ENCOUNTER — OFFICE VISIT (OUTPATIENT)
Dept: INTERNAL MEDICINE | Facility: CLINIC | Age: 83
End: 2018-07-12

## 2018-07-12 VITALS
HEIGHT: 69 IN | HEART RATE: 68 BPM | WEIGHT: 186 LBS | BODY MASS INDEX: 27.55 KG/M2 | SYSTOLIC BLOOD PRESSURE: 120 MMHG | DIASTOLIC BLOOD PRESSURE: 70 MMHG

## 2018-07-12 DIAGNOSIS — N18.30 CHRONIC KIDNEY DISEASE, STAGE III (MODERATE) (HCC): ICD-10-CM

## 2018-07-12 DIAGNOSIS — D36.9 TUBULAR ADENOMA: ICD-10-CM

## 2018-07-12 DIAGNOSIS — F41.9 ANXIETY: ICD-10-CM

## 2018-07-12 DIAGNOSIS — N40.0 ENLARGED PROSTATE WITHOUT LOWER URINARY TRACT SYMPTOMS (LUTS): ICD-10-CM

## 2018-07-12 DIAGNOSIS — G62.9 NEUROPATHY: ICD-10-CM

## 2018-07-12 DIAGNOSIS — M15.9 GENERALIZED OSTEOARTHRITIS: ICD-10-CM

## 2018-07-12 DIAGNOSIS — I25.10 ATHEROSCLEROSIS OF NATIVE CORONARY ARTERY OF NATIVE HEART WITHOUT ANGINA PECTORIS: Primary | ICD-10-CM

## 2018-07-12 DIAGNOSIS — J43.8 OTHER EMPHYSEMA (HCC): ICD-10-CM

## 2018-07-12 DIAGNOSIS — I73.9 PVD (PERIPHERAL VASCULAR DISEASE) (HCC): ICD-10-CM

## 2018-07-12 DIAGNOSIS — K57.30 DIVERTICULOSIS OF LARGE INTESTINE WITHOUT HEMORRHAGE: ICD-10-CM

## 2018-07-12 DIAGNOSIS — I10 ESSENTIAL HYPERTENSION: ICD-10-CM

## 2018-07-12 DIAGNOSIS — E78.49 OTHER HYPERLIPIDEMIA: ICD-10-CM

## 2018-07-12 DIAGNOSIS — G47.00 INSOMNIA, UNSPECIFIED TYPE: ICD-10-CM

## 2018-07-12 PROCEDURE — 99214 OFFICE O/P EST MOD 30 MIN: CPT | Performed by: INTERNAL MEDICINE

## 2018-07-12 NOTE — PROGRESS NOTES
Patient is a 82 y.o. male who is here for a follow up of chronic conditions.  Chief Complaint   Patient presents with   • Hypertension   • Hyperlipidemia         HPI:    Here for f/u.  Doing ok.  No dizziness or lightheadedness.  Breathing is not the best.  Worst with bending over.  Continues to gain weight.  No dizziness or lightheadedness.  No CP.    History:    Patient Active Problem List   Diagnosis   • Anxiety   • Aortic aneurysm (CMS/HCC)   • Atherosclerotic heart disease of native coronary artery without angina pectoris   • Chronic obstructive pulmonary disease (CMS/HCC)   • Chronic kidney disease, stage III (moderate)   • Diverticulosis of large intestine   • Enlarged prostate without lower urinary tract symptoms (luts)   • Generalized osteoarthritis   • Hyperlipidemia   • Hypertension   • Insomnia   • Neuropathy   • PVD (peripheral vascular disease) (CMS/HCC)   • Tubular adenoma   • Absence of kidney   • Arthralgia of hip       Past Medical History:   Diagnosis Date   • Colon polyps    • Ecchymosis    • Heart attack        Past Surgical History:   Procedure Laterality Date   • ROTATOR CUFF REPAIR Right 2008   • TONSILLECTOMY         Current Outpatient Prescriptions on File Prior to Visit   Medication Sig   • amLODIPine (NORVASC) 5 MG tablet take 1 tablet by mouth once daily   • aspirin 81 MG tablet Take  by mouth Daily.   • carvedilol (COREG) 12.5 MG tablet 2 (Two) Times a Day.   • clonazePAM (KlonoPIN) 0.5 MG tablet take 1-2 tablets by mouth at bedtime if needed   • CRESTOR 20 MG tablet Every Night.   • DULoxetine (CYMBALTA) 60 MG capsule Daily.   • dutasteride (AVODART) 0.5 MG capsule Daily.   • fluticasone (FLONASE) 50 MCG/ACT nasal spray instill 1 spray into each nostril twice a day   • paricalcitol (ZEMPLAR) 1 MCG capsule    • sulfaSALAzine (AZULFIDINE) 500 MG tablet 2 (Two) Times a Day.   • TUDORZA PRESSAIR 400 MCG/ACT aerosol powder  powder for inhalation inhale 1 puff by mouth twice a day   •  VOLTAREN 1 % gel gel apply 2 grams to AFFECTED FINGER three times a day if needed   • zolpidem CR (AMBIEN CR) 12.5 MG CR tablet Daily.   • tamsulosin (FLOMAX) 0.4 MG capsule 24 hr capsule 2 (Two) Times a Day.     No current facility-administered medications on file prior to visit.        Family History   Problem Relation Age of Onset   • Cancer Other    • Heart attack Other    • Hypertension Other    • Hyperlipidemia Other        Social History     Social History   • Marital status:      Spouse name: N/A   • Number of children: N/A   • Years of education: N/A     Occupational History   • Not on file.     Social History Main Topics   • Smoking status: Former Smoker   • Smokeless tobacco: Not on file   • Alcohol use Not on file   • Drug use: Unknown   • Sexual activity: Not on file     Other Topics Concern   • Not on file     Social History Narrative   • No narrative on file         Review of Systems   Constitutional: Positive for fatigue. Negative for chills, diaphoresis, fever and unexpected weight change.   HENT: Negative for congestion, ear pain, hearing loss, nosebleeds, postnasal drip, sinus pressure and sore throat.    Eyes: Negative for pain, discharge and itching.   Respiratory: Positive for shortness of breath. Negative for cough, chest tightness and wheezing.    Cardiovascular: Negative for chest pain, palpitations and leg swelling.   Gastrointestinal: Negative for abdominal distention, abdominal pain, blood in stool, constipation, diarrhea, nausea and vomiting.        5/15 colonoscopy with TA   Endocrine: Negative for polydipsia and polyuria.   Genitourinary: Negative for difficulty urinating, dysuria, frequency and hematuria.        Followed by urology   Musculoskeletal: Positive for arthralgias and back pain. Negative for gait problem, joint swelling and myalgias.   Skin: Negative for rash and wound.   Neurological: Positive for weakness. Negative for dizziness, syncope and headaches.  "  Psychiatric/Behavioral: Positive for sleep disturbance. Negative for dysphoric mood. The patient is not nervous/anxious.        /70   Pulse 68   Ht 175.3 cm (69.02\")   Wt 84.4 kg (186 lb)   BMI 27.45 kg/m²       Physical Exam   Constitutional: He is oriented to person, place, and time. He appears well-developed and well-nourished.   HENT:   Head: Normocephalic and atraumatic.   Right Ear: External ear normal.   Left Ear: External ear normal.   Mouth/Throat: Oropharynx is clear and moist.   Eyes: Conjunctivae and EOM are normal.   Neck: Normal range of motion. Neck supple.   Cardiovascular: Normal rate, regular rhythm and normal heart sounds.    Pulmonary/Chest: Effort normal and breath sounds normal.   Abdominal: Soft. Bowel sounds are normal.   Musculoskeletal: Normal range of motion.   Lymphadenopathy:     He has no cervical adenopathy.   Neurological: He is alert and oriented to person, place, and time.   Skin: Skin is warm and dry.   Psychiatric: He has a normal mood and affect. His behavior is normal. Thought content normal.       Procedure:      Discussion/Summary:    htn-stable  cri-stable, labs today  copd-stable   hyperlipidemia-labs on rtc, counseled on diet  bph-stable  diverticulosis-citrucel qd  TA-colonoscopy noted, needs scope 5/18  cad/pvd-cont rf mod  insomnia-change to klonopine qhs prn, advised of risk  djd/neuropathy-improved with cymbalta, rf norco, counseled on risk of addiction  left hip pain-improved, MRI noted      labs noted and dw patient  Advised to increase fluids    Current Outpatient Prescriptions:   •  amLODIPine (NORVASC) 5 MG tablet, take 1 tablet by mouth once daily, Disp: 90 tablet, Rfl: 0  •  aspirin 81 MG tablet, Take  by mouth Daily., Disp: , Rfl:   •  carvedilol (COREG) 12.5 MG tablet, 2 (Two) Times a Day., Disp: , Rfl: 0  •  clonazePAM (KlonoPIN) 0.5 MG tablet, take 1-2 tablets by mouth at bedtime if needed, Disp: 60 tablet, Rfl: 1  •  CRESTOR 20 MG tablet, Every " Night., Disp: , Rfl: 0  •  DULoxetine (CYMBALTA) 60 MG capsule, Daily., Disp: , Rfl: 0  •  dutasteride (AVODART) 0.5 MG capsule, Daily., Disp: , Rfl: 0  •  fluticasone (FLONASE) 50 MCG/ACT nasal spray, instill 1 spray into each nostril twice a day, Disp: 1 bottle, Rfl: 3  •  paricalcitol (ZEMPLAR) 1 MCG capsule, , Disp: , Rfl: 0  •  sulfaSALAzine (AZULFIDINE) 500 MG tablet, 2 (Two) Times a Day., Disp: , Rfl: 0  •  TUDORZA PRESSAIR 400 MCG/ACT aerosol powder  powder for inhalation, inhale 1 puff by mouth twice a day, Disp: 1 each, Rfl: 3  •  VOLTAREN 1 % gel gel, apply 2 grams to AFFECTED FINGER three times a day if needed, Disp: , Rfl: 0  •  zolpidem CR (AMBIEN CR) 12.5 MG CR tablet, Daily., Disp: , Rfl: 0  •  tamsulosin (FLOMAX) 0.4 MG capsule 24 hr capsule, 2 (Two) Times a Day., Disp: , Rfl: 0        Rylan was seen today for hypertension and hyperlipidemia.    Diagnoses and all orders for this visit:    Atherosclerosis of native coronary artery of native heart without angina pectoris    Other hyperlipidemia  -     TSH    Essential hypertension    PVD (peripheral vascular disease) (CMS/HCC)    Other emphysema (CMS/HCC)    Diverticulosis of large intestine without hemorrhage    Neuropathy    Generalized osteoarthritis    Chronic kidney disease, stage III (moderate)  -     CBC (No Diff)  -     Comprehensive Metabolic Panel    Enlarged prostate without lower urinary tract symptoms (luts)    Anxiety    Insomnia, unspecified type    Tubular adenoma

## 2018-07-13 LAB
ALBUMIN SERPL-MCNC: 4.61 G/DL (ref 3.2–4.8)
ALBUMIN/GLOB SERPL: 1.8 G/DL (ref 1.5–2.5)
ALP SERPL-CCNC: 72 U/L (ref 25–100)
ALT SERPL-CCNC: 21 U/L (ref 7–40)
AST SERPL-CCNC: 19 U/L (ref 0–33)
BILIRUB SERPL-MCNC: 0.6 MG/DL (ref 0.3–1.2)
BUN SERPL-MCNC: 28 MG/DL (ref 9–23)
BUN/CREAT SERPL: 16.6 (ref 7–25)
CALCIUM SERPL-MCNC: 9.1 MG/DL (ref 8.7–10.4)
CHLORIDE SERPL-SCNC: 107 MMOL/L (ref 99–109)
CO2 SERPL-SCNC: 29 MMOL/L (ref 20–31)
CREAT SERPL-MCNC: 1.69 MG/DL (ref 0.6–1.3)
ERYTHROCYTE [DISTWIDTH] IN BLOOD BY AUTOMATED COUNT: 14.1 % (ref 11.3–14.5)
GLOBULIN SER CALC-MCNC: 2.6 GM/DL
GLUCOSE SERPL-MCNC: 113 MG/DL (ref 70–100)
HCT VFR BLD AUTO: 46.3 % (ref 38.9–50.9)
HGB BLD-MCNC: 14.4 G/DL (ref 13.1–17.5)
MCH RBC QN AUTO: 30.1 PG (ref 27–31)
MCHC RBC AUTO-ENTMCNC: 31.1 G/DL (ref 32–36)
MCV RBC AUTO: 96.7 FL (ref 80–99)
PLATELET # BLD AUTO: 242 10*3/MM3 (ref 150–450)
POTASSIUM SERPL-SCNC: 4.7 MMOL/L (ref 3.5–5.5)
PROT SERPL-MCNC: 7.2 G/DL (ref 5.7–8.2)
RBC # BLD AUTO: 4.79 10*6/MM3 (ref 4.2–5.76)
SODIUM SERPL-SCNC: 141 MMOL/L (ref 132–146)
TSH SERPL DL<=0.005 MIU/L-ACNC: 0.98 MIU/ML (ref 0.35–5.35)
WBC # BLD AUTO: 8.15 10*3/MM3 (ref 3.5–10.8)

## 2018-07-30 RX ORDER — CLONAZEPAM 0.5 MG/1
TABLET ORAL
Qty: 60 TABLET | Refills: 1 | Status: SHIPPED | OUTPATIENT
Start: 2018-07-30 | End: 2018-12-18 | Stop reason: SDUPTHER

## 2018-10-01 RX ORDER — ACLIDINIUM BROMIDE 400 UG/1
POWDER, METERED RESPIRATORY (INHALATION)
Qty: 1 EACH | Refills: 3 | Status: SHIPPED | OUTPATIENT
Start: 2018-10-01 | End: 2019-02-20 | Stop reason: SDUPTHER

## 2018-10-26 ENCOUNTER — OFFICE VISIT (OUTPATIENT)
Dept: INTERNAL MEDICINE | Facility: CLINIC | Age: 83
End: 2018-10-26

## 2018-10-26 VITALS
HEIGHT: 69 IN | BODY MASS INDEX: 26.51 KG/M2 | DIASTOLIC BLOOD PRESSURE: 70 MMHG | HEART RATE: 68 BPM | SYSTOLIC BLOOD PRESSURE: 124 MMHG | WEIGHT: 179 LBS

## 2018-10-26 DIAGNOSIS — G62.9 NEUROPATHY: ICD-10-CM

## 2018-10-26 DIAGNOSIS — K57.30 DIVERTICULOSIS OF LARGE INTESTINE WITHOUT HEMORRHAGE: ICD-10-CM

## 2018-10-26 DIAGNOSIS — Z23 NEED FOR INFLUENZA VACCINATION: ICD-10-CM

## 2018-10-26 DIAGNOSIS — N18.30 CHRONIC KIDNEY DISEASE, STAGE III (MODERATE) (HCC): ICD-10-CM

## 2018-10-26 DIAGNOSIS — J43.8 OTHER EMPHYSEMA (HCC): ICD-10-CM

## 2018-10-26 DIAGNOSIS — I10 ESSENTIAL HYPERTENSION: ICD-10-CM

## 2018-10-26 DIAGNOSIS — N40.0 ENLARGED PROSTATE WITHOUT LOWER URINARY TRACT SYMPTOMS (LUTS): ICD-10-CM

## 2018-10-26 DIAGNOSIS — F41.9 ANXIETY: ICD-10-CM

## 2018-10-26 DIAGNOSIS — G47.00 INSOMNIA, UNSPECIFIED TYPE: ICD-10-CM

## 2018-10-26 DIAGNOSIS — I25.10 ATHEROSCLEROSIS OF NATIVE CORONARY ARTERY OF NATIVE HEART WITHOUT ANGINA PECTORIS: Primary | ICD-10-CM

## 2018-10-26 DIAGNOSIS — E78.49 OTHER HYPERLIPIDEMIA: ICD-10-CM

## 2018-10-26 DIAGNOSIS — I73.9 PVD (PERIPHERAL VASCULAR DISEASE) (HCC): ICD-10-CM

## 2018-10-26 DIAGNOSIS — D36.9 TUBULAR ADENOMA: ICD-10-CM

## 2018-10-26 LAB
ALBUMIN SERPL-MCNC: 4.36 G/DL (ref 3.2–4.8)
ALBUMIN/GLOB SERPL: 2.1 G/DL (ref 1.5–2.5)
ALP SERPL-CCNC: 67 U/L (ref 25–100)
ALT SERPL-CCNC: 18 U/L (ref 7–40)
AST SERPL-CCNC: 19 U/L (ref 0–33)
BILIRUB SERPL-MCNC: 0.5 MG/DL (ref 0.3–1.2)
BUN SERPL-MCNC: 26 MG/DL (ref 9–23)
BUN/CREAT SERPL: 18.3 (ref 7–25)
CALCIUM SERPL-MCNC: 8.9 MG/DL (ref 8.7–10.4)
CHLORIDE SERPL-SCNC: 104 MMOL/L (ref 99–109)
CHOLEST SERPL-MCNC: 150 MG/DL (ref 0–200)
CO2 SERPL-SCNC: 27 MMOL/L (ref 20–31)
CREAT SERPL-MCNC: 1.42 MG/DL (ref 0.6–1.3)
GLOBULIN SER CALC-MCNC: 2 GM/DL
GLUCOSE SERPL-MCNC: 88 MG/DL (ref 70–100)
HDLC SERPL-MCNC: 35 MG/DL (ref 40–60)
LDLC SERPL CALC-MCNC: 74 MG/DL (ref 0–100)
POTASSIUM SERPL-SCNC: 4.7 MMOL/L (ref 3.5–5.5)
PROT SERPL-MCNC: 6.4 G/DL (ref 5.7–8.2)
SODIUM SERPL-SCNC: 138 MMOL/L (ref 132–146)
TRIGL SERPL-MCNC: 207 MG/DL (ref 0–150)
VLDLC SERPL CALC-MCNC: 41.4 MG/DL

## 2018-10-26 PROCEDURE — 90662 IIV NO PRSV INCREASED AG IM: CPT | Performed by: INTERNAL MEDICINE

## 2018-10-26 PROCEDURE — G0008 ADMIN INFLUENZA VIRUS VAC: HCPCS | Performed by: INTERNAL MEDICINE

## 2018-10-26 PROCEDURE — 99214 OFFICE O/P EST MOD 30 MIN: CPT | Performed by: INTERNAL MEDICINE

## 2018-10-26 RX ORDER — DULOXETIN HYDROCHLORIDE 60 MG/1
60 CAPSULE, DELAYED RELEASE ORAL DAILY
Qty: 90 CAPSULE | Refills: 3 | Status: SHIPPED | OUTPATIENT
Start: 2018-10-26 | End: 2019-06-27 | Stop reason: SDUPTHER

## 2018-10-26 NOTE — PROGRESS NOTES
Patient is a 82 y.o. male who is here for a follow up of chronic conditions.  Chief Complaint   Patient presents with   • Hypertension   • Hyperlipidemia         HPI:    Here for f/u.  Doing ok.  Breathing is good.  Has lost 7 pounds by dieting.  Feels better overall.  No dizziness or lightheadedness.  Good uop.  No abdominal pains.  Wants to stay on cymbalta.  Sleeping well.     History:    Patient Active Problem List   Diagnosis   • Anxiety   • Aortic aneurysm (CMS/HCC)   • Atherosclerotic heart disease of native coronary artery without angina pectoris   • Chronic obstructive pulmonary disease (CMS/HCC)   • Chronic kidney disease, stage III (moderate) (CMS/HCC)   • Diverticulosis of large intestine   • Enlarged prostate without lower urinary tract symptoms (luts)   • Generalized osteoarthritis   • Hyperlipidemia   • Hypertension   • Insomnia   • Neuropathy   • PVD (peripheral vascular disease) (CMS/[facility])   • Tubular adenoma   • Absence of kidney   • Arthralgia of hip       Past Medical History:   Diagnosis Date   • Colon polyps    • Ecchymosis    • Heart attack (CMS/[facility])        Past Surgical History:   Procedure Laterality Date   • ROTATOR CUFF REPAIR Right 2008   • TONSILLECTOMY         Current Outpatient Prescriptions on File Prior to Visit   Medication Sig   • amLODIPine (NORVASC) 5 MG tablet take 1 tablet by mouth once daily   • aspirin 81 MG tablet Take  by mouth Daily.   • carvedilol (COREG) 12.5 MG tablet 2 (Two) Times a Day.   • clonazePAM (KlonoPIN) 0.5 MG tablet take 1-2 tablets by mouth at bedtime if needed   • CRESTOR 20 MG tablet Every Night.   • dutasteride (AVODART) 0.5 MG capsule Daily.   • fluticasone (FLONASE) 50 MCG/ACT nasal spray instill 1 spray into each nostril twice a day   • paricalcitol (ZEMPLAR) 1 MCG capsule    • sulfaSALAzine (AZULFIDINE) 500 MG tablet 2 (Two) Times a Day.   • tamsulosin (FLOMAX) 0.4 MG capsule 24 hr capsule 2 (Two) Times a Day.   • TUDORZA PRESSAIR 400 MCG/ACT aerosol  powder  powder for inhalation inhale 1 puff by mouth twice a day   • VOLTAREN 1 % gel gel apply 2 grams to AFFECTED FINGER three times a day if needed   • zolpidem CR (AMBIEN CR) 12.5 MG CR tablet Daily.   • [DISCONTINUED] DULoxetine (CYMBALTA) 60 MG capsule Daily.     No current facility-administered medications on file prior to visit.        Family History   Problem Relation Age of Onset   • Cancer Other    • Heart attack Other    • Hypertension Other    • Hyperlipidemia Other        Social History     Social History   • Marital status:      Spouse name: N/A   • Number of children: N/A   • Years of education: N/A     Occupational History   • Not on file.     Social History Main Topics   • Smoking status: Former Smoker   • Smokeless tobacco: Not on file   • Alcohol use Not on file   • Drug use: Unknown   • Sexual activity: Not on file     Other Topics Concern   • Not on file     Social History Narrative   • No narrative on file         Review of Systems   Constitutional: Negative for chills, diaphoresis, fever and unexpected weight change.   HENT: Negative for congestion, ear pain, hearing loss, nosebleeds, postnasal drip, sinus pressure and sore throat.    Eyes: Negative for pain, discharge and itching.   Respiratory: Positive for shortness of breath. Negative for cough, chest tightness and wheezing.    Cardiovascular: Negative for chest pain, palpitations and leg swelling.   Gastrointestinal: Negative for abdominal distention, abdominal pain, blood in stool, constipation, diarrhea, nausea and vomiting.        5/15 colonoscopy with TA   Endocrine: Negative for polydipsia and polyuria.   Genitourinary: Negative for difficulty urinating, dysuria, frequency and hematuria.        Followed by urology   Musculoskeletal: Positive for arthralgias and back pain. Negative for gait problem, joint swelling and myalgias.   Skin: Negative for rash and wound.   Neurological: Negative for dizziness, syncope and headaches.  "  Psychiatric/Behavioral: Positive for sleep disturbance. Negative for dysphoric mood. The patient is not nervous/anxious.        /70   Pulse 68   Ht 175.3 cm (69.02\")   Wt 81.2 kg (179 lb)   BMI 26.42 kg/m²       Physical Exam   Constitutional: He is oriented to person, place, and time. He appears well-developed and well-nourished.   HENT:   Head: Normocephalic and atraumatic.   Right Ear: External ear normal.   Left Ear: External ear normal.   Mouth/Throat: Oropharynx is clear and moist.   Eyes: Conjunctivae and EOM are normal.   Neck: Normal range of motion. Neck supple.   Cardiovascular: Normal rate, regular rhythm and normal heart sounds.    Pulmonary/Chest: Effort normal and breath sounds normal.   Abdominal: Soft. Bowel sounds are normal.   Musculoskeletal: Normal range of motion.   Lymphadenopathy:     He has no cervical adenopathy.   Neurological: He is alert and oriented to person, place, and time.   Skin: Skin is warm and dry.   Psychiatric: He has a normal mood and affect. His behavior is normal. Thought content normal.       Procedure:      Discussion/Summary:    htn-stable  cri-stable, labs today  copd-stable   hyperlipidemia-labs today, counseled on diet  bph-stable  diverticulosis-citrucel qd  TA-colonoscopy noted, needs scope 5/18  cad/pvd-cont rf mod  insomnia-change to klonopine qhs prn, advised of risk  djd/neuropathy-improved with cymbalta, rf norco, counseled on risk of addiction  left hip pain-improved, MRI noted      labs noted and dw patient, patient request flu vaccination, since he has multiple RF for influenza will give flu shot  Advised to increase fluids    Current Outpatient Prescriptions:   •  amLODIPine (NORVASC) 5 MG tablet, take 1 tablet by mouth once daily, Disp: 90 tablet, Rfl: 0  •  aspirin 81 MG tablet, Take  by mouth Daily., Disp: , Rfl:   •  carvedilol (COREG) 12.5 MG tablet, 2 (Two) Times a Day., Disp: , Rfl: 0  •  clonazePAM (KlonoPIN) 0.5 MG tablet, take 1-2 " tablets by mouth at bedtime if needed, Disp: 60 tablet, Rfl: 1  •  CRESTOR 20 MG tablet, Every Night., Disp: , Rfl: 0  •  DULoxetine (CYMBALTA) 60 MG capsule, Take 1 capsule by mouth Daily., Disp: 90 capsule, Rfl: 3  •  dutasteride (AVODART) 0.5 MG capsule, Daily., Disp: , Rfl: 0  •  fluticasone (FLONASE) 50 MCG/ACT nasal spray, instill 1 spray into each nostril twice a day, Disp: 1 bottle, Rfl: 3  •  paricalcitol (ZEMPLAR) 1 MCG capsule, , Disp: , Rfl: 0  •  sulfaSALAzine (AZULFIDINE) 500 MG tablet, 2 (Two) Times a Day., Disp: , Rfl: 0  •  tamsulosin (FLOMAX) 0.4 MG capsule 24 hr capsule, 2 (Two) Times a Day., Disp: , Rfl: 0  •  TUDORZA PRESSAIR 400 MCG/ACT aerosol powder  powder for inhalation, inhale 1 puff by mouth twice a day, Disp: 1 each, Rfl: 3  •  VOLTAREN 1 % gel gel, apply 2 grams to AFFECTED FINGER three times a day if needed, Disp: , Rfl: 0  •  zolpidem CR (AMBIEN CR) 12.5 MG CR tablet, Daily., Disp: , Rfl: 0        Rylan was seen today for hypertension and hyperlipidemia.    Diagnoses and all orders for this visit:    Atherosclerosis of native coronary artery of native heart without angina pectoris    Other hyperlipidemia  -     Comprehensive Metabolic Panel  -     Lipid Panel    Essential hypertension    PVD (peripheral vascular disease) (CMS/HCC)    Other emphysema (CMS/HCC)    Diverticulosis of large intestine without hemorrhage    Neuropathy  -     DULoxetine (CYMBALTA) 60 MG capsule; Take 1 capsule by mouth Daily.    Chronic kidney disease, stage III (moderate) (CMS/HCC)    Enlarged prostate without lower urinary tract symptoms (luts)    Anxiety    Insomnia, unspecified type    Tubular adenoma    Need for influenza vaccination  -     Fluzone High Dose =>65Years

## 2018-11-05 ENCOUNTER — TELEPHONE (OUTPATIENT)
Dept: INTERNAL MEDICINE | Facility: CLINIC | Age: 83
End: 2018-11-05

## 2018-12-04 RX ORDER — AMLODIPINE BESYLATE 2.5 MG/1
2.5 TABLET ORAL DAILY
Qty: 90 TABLET | Refills: 1 | Status: SHIPPED | OUTPATIENT
Start: 2018-12-04 | End: 2019-05-30 | Stop reason: SDUPTHER

## 2018-12-18 RX ORDER — CLONAZEPAM 0.5 MG/1
TABLET ORAL
Qty: 60 TABLET | Refills: 1 | Status: SHIPPED | OUTPATIENT
Start: 2018-12-18 | End: 2019-05-21 | Stop reason: SDUPTHER

## 2019-02-20 RX ORDER — ACLIDINIUM BROMIDE 400 UG/1
POWDER, METERED RESPIRATORY (INHALATION)
Qty: 1 EACH | Refills: 3 | Status: SHIPPED | OUTPATIENT
Start: 2019-02-20 | End: 2019-07-13 | Stop reason: SDUPTHER

## 2019-02-26 ENCOUNTER — OFFICE VISIT (OUTPATIENT)
Dept: INTERNAL MEDICINE | Facility: CLINIC | Age: 84
End: 2019-02-26

## 2019-02-26 VITALS
DIASTOLIC BLOOD PRESSURE: 70 MMHG | SYSTOLIC BLOOD PRESSURE: 140 MMHG | WEIGHT: 170 LBS | HEART RATE: 68 BPM | HEIGHT: 69 IN | BODY MASS INDEX: 25.18 KG/M2

## 2019-02-26 DIAGNOSIS — K57.30 DIVERTICULOSIS OF LARGE INTESTINE WITHOUT HEMORRHAGE: ICD-10-CM

## 2019-02-26 DIAGNOSIS — I73.9 PVD (PERIPHERAL VASCULAR DISEASE) (HCC): Primary | ICD-10-CM

## 2019-02-26 DIAGNOSIS — J43.8 OTHER EMPHYSEMA (HCC): ICD-10-CM

## 2019-02-26 DIAGNOSIS — M15.9 GENERALIZED OSTEOARTHRITIS: ICD-10-CM

## 2019-02-26 DIAGNOSIS — N18.30 CHRONIC KIDNEY DISEASE, STAGE III (MODERATE) (HCC): ICD-10-CM

## 2019-02-26 DIAGNOSIS — F41.9 ANXIETY: ICD-10-CM

## 2019-02-26 DIAGNOSIS — G62.9 NEUROPATHY: ICD-10-CM

## 2019-02-26 DIAGNOSIS — I10 ESSENTIAL HYPERTENSION: ICD-10-CM

## 2019-02-26 DIAGNOSIS — M25.559 ARTHRALGIA OF HIP, UNSPECIFIED LATERALITY: ICD-10-CM

## 2019-02-26 DIAGNOSIS — N40.0 ENLARGED PROSTATE WITHOUT LOWER URINARY TRACT SYMPTOMS (LUTS): ICD-10-CM

## 2019-02-26 DIAGNOSIS — I71.40 ABDOMINAL AORTIC ANEURYSM (AAA) WITHOUT RUPTURE (HCC): ICD-10-CM

## 2019-02-26 DIAGNOSIS — D36.9 TUBULAR ADENOMA: ICD-10-CM

## 2019-02-26 DIAGNOSIS — E78.49 OTHER HYPERLIPIDEMIA: ICD-10-CM

## 2019-02-26 DIAGNOSIS — G47.00 INSOMNIA, UNSPECIFIED TYPE: ICD-10-CM

## 2019-02-26 DIAGNOSIS — I25.10 ATHEROSCLEROSIS OF NATIVE CORONARY ARTERY OF NATIVE HEART WITHOUT ANGINA PECTORIS: ICD-10-CM

## 2019-02-26 LAB
APPEARANCE UR: CLEAR
BACTERIA #/AREA URNS HPF: NORMAL /HPF
BILIRUB UR QL STRIP: NEGATIVE
CASTS URNS MICRO: NORMAL
COLOR UR: YELLOW
EPI CELLS #/AREA URNS HPF: NORMAL /HPF
GLUCOSE UR QL: NEGATIVE
HGB UR QL STRIP: NEGATIVE
KETONES UR QL STRIP: NEGATIVE
LEUKOCYTE ESTERASE UR QL STRIP: NEGATIVE
NITRITE UR QL STRIP: NEGATIVE
PH UR STRIP: <=5 [PH] (ref 5–8)
PROT UR QL STRIP: NEGATIVE
RBC #/AREA URNS HPF: NORMAL /HPF
SP GR UR: 1.01 (ref 1–1.03)
UROBILINOGEN UR STRIP-MCNC: (no result) MG/DL
WBC #/AREA URNS HPF: NORMAL /HPF

## 2019-02-26 PROCEDURE — 99214 OFFICE O/P EST MOD 30 MIN: CPT | Performed by: INTERNAL MEDICINE

## 2019-02-26 NOTE — PROGRESS NOTES
Patient is a 83 y.o. male who is here for a follow up of chronic conditions.  Chief Complaint   Patient presents with   • Hyperlipidemia   • Hypertension         HPI:    Here for f/u. BP has been good.  No dizziness or lightheadedness.  Breathing good.  Back pain is controlled.  No PAD symptoms.  Sleeping ok.  No abdominal pains.      History:    Patient Active Problem List   Diagnosis   • Anxiety   • Aortic aneurysm (CMS/HCC)   • Atherosclerotic heart disease of native coronary artery without angina pectoris   • Chronic obstructive pulmonary disease (CMS/HCC)   • Chronic kidney disease, stage III (moderate) (CMS/HCC)   • Diverticulosis of large intestine   • Enlarged prostate without lower urinary tract symptoms (luts)   • Generalized osteoarthritis   • Hyperlipidemia   • Hypertension   • Insomnia   • Neuropathy   • PVD (peripheral vascular disease) (CMS/[facility])   • Tubular adenoma   • Absence of kidney   • Arthralgia of hip       Past Medical History:   Diagnosis Date   • Colon polyps    • Ecchymosis    • Heart attack (CMS/HCC)        Past Surgical History:   Procedure Laterality Date   • ROTATOR CUFF REPAIR Right 2008   • TONSILLECTOMY         Current Outpatient Medications on File Prior to Visit   Medication Sig   • amLODIPine (NORVASC) 2.5 MG tablet Take 1 tablet by mouth Daily.   • aspirin 81 MG tablet Take  by mouth Daily.   • carvedilol (COREG) 12.5 MG tablet 2 (Two) Times a Day.   • clonazePAM (KlonoPIN) 0.5 MG tablet take 1 to 2 tablets by mouth at bedtime if needed   • CRESTOR 20 MG tablet Every Night.   • DULoxetine (CYMBALTA) 60 MG capsule Take 1 capsule by mouth Daily.   • dutasteride (AVODART) 0.5 MG capsule Daily.   • fluticasone (FLONASE) 50 MCG/ACT nasal spray instill 1 spray into each nostril twice a day   • paricalcitol (ZEMPLAR) 1 MCG capsule    • sulfaSALAzine (AZULFIDINE) 500 MG tablet 2 (Two) Times a Day.   • tamsulosin (FLOMAX) 0.4 MG capsule 24 hr capsule 2 (Two) Times a Day.   • TUDORZA  "PRESSAIR 400 MCG/ACT aerosol powder  powder for inhalation inhale 1 puff by mouth twice a day   • VOLTAREN 1 % gel gel apply 2 grams to AFFECTED FINGER three times a day if needed   • zolpidem CR (AMBIEN CR) 12.5 MG CR tablet Daily.     No current facility-administered medications on file prior to visit.        Family History   Problem Relation Age of Onset   • Cancer Other    • Heart attack Other    • Hypertension Other    • Hyperlipidemia Other        Social History     Socioeconomic History   • Marital status:      Spouse name: Not on file   • Number of children: Not on file   • Years of education: Not on file   • Highest education level: Not on file   Social Needs   • Financial resource strain: Not on file   • Food insecurity - worry: Not on file   • Food insecurity - inability: Not on file   • Transportation needs - medical: Not on file   • Transportation needs - non-medical: Not on file   Occupational History   • Not on file   Tobacco Use   • Smoking status: Former Smoker   Substance and Sexual Activity   • Alcohol use: Not on file   • Drug use: Not on file   • Sexual activity: Not on file   Other Topics Concern   • Not on file   Social History Narrative   • Not on file         Review of Systems   Constitutional: Negative for chills, diaphoresis, fever and unexpected weight change.   HENT: Negative for congestion, ear pain, hearing loss, nosebleeds, postnasal drip, sinus pressure and sore throat.    Eyes: Negative for pain, discharge and itching.   Respiratory: Positive for shortness of breath. Negative for cough, chest tightness and wheezing.    Cardiovascular: Negative for chest pain, palpitations and leg swelling.   Gastrointestinal: Negative for abdominal distention, abdominal pain, blood in stool, constipation, diarrhea, nausea and vomiting.        5/15 colonoscopy with TA  7/18 \" \"   Endocrine: Negative for polydipsia and polyuria.   Genitourinary: Negative for difficulty urinating, dysuria, " "frequency and hematuria.        Followed by urology   Musculoskeletal: Positive for arthralgias and back pain. Negative for gait problem, joint swelling and myalgias.   Skin: Negative for rash and wound.   Neurological: Negative for dizziness, syncope and headaches.   Psychiatric/Behavioral: Positive for sleep disturbance. Negative for dysphoric mood. The patient is not nervous/anxious.        /70   Pulse 68   Ht 175.3 cm (69.02\")   Wt 77.1 kg (170 lb)   BMI 25.09 kg/m²       Physical Exam   Constitutional: He is oriented to person, place, and time. He appears well-developed and well-nourished.   HENT:   Head: Normocephalic and atraumatic.   Right Ear: External ear normal.   Left Ear: External ear normal.   Mouth/Throat: Oropharynx is clear and moist.   Eyes: Conjunctivae and EOM are normal.   Neck: Normal range of motion. Neck supple.   Cardiovascular: Normal rate, regular rhythm and normal heart sounds.   Pulmonary/Chest: Effort normal and breath sounds normal.   Abdominal: Soft. Bowel sounds are normal.   Musculoskeletal: Normal range of motion.   Lymphadenopathy:     He has no cervical adenopathy.   Neurological: He is alert and oriented to person, place, and time.   Skin: Skin is warm and dry.   Psychiatric: He has a normal mood and affect. His behavior is normal. Thought content normal.       Procedure:      Discussion/Summary:    htn-stable  cri-stable, labs today  copd-stable   hyperlipidemia-labs noted, counseled on diet  bph-stable  diverticulosis-citrucel qd   TA-colonoscopy noted, needs scope 7/23  cad/pvd-cont rf mod  insomnia-change to klonopine qhs prn, advised of risk  djd/neuropathy-improved with cymbalta, rf norco, counseled on risk of addiction  left hip pain-improved, MRI noted      labs noted and dw patient  Advised to increase fluids        Current Outpatient Medications:   •  amLODIPine (NORVASC) 2.5 MG tablet, Take 1 tablet by mouth Daily., Disp: 90 tablet, Rfl: 1  •  aspirin 81 MG " tablet, Take  by mouth Daily., Disp: , Rfl:   •  carvedilol (COREG) 12.5 MG tablet, 2 (Two) Times a Day., Disp: , Rfl: 0  •  clonazePAM (KlonoPIN) 0.5 MG tablet, take 1 to 2 tablets by mouth at bedtime if needed, Disp: 60 tablet, Rfl: 1  •  CRESTOR 20 MG tablet, Every Night., Disp: , Rfl: 0  •  DULoxetine (CYMBALTA) 60 MG capsule, Take 1 capsule by mouth Daily., Disp: 90 capsule, Rfl: 3  •  dutasteride (AVODART) 0.5 MG capsule, Daily., Disp: , Rfl: 0  •  fluticasone (FLONASE) 50 MCG/ACT nasal spray, instill 1 spray into each nostril twice a day, Disp: 1 bottle, Rfl: 3  •  paricalcitol (ZEMPLAR) 1 MCG capsule, , Disp: , Rfl: 0  •  sulfaSALAzine (AZULFIDINE) 500 MG tablet, 2 (Two) Times a Day., Disp: , Rfl: 0  •  tamsulosin (FLOMAX) 0.4 MG capsule 24 hr capsule, 2 (Two) Times a Day., Disp: , Rfl: 0  •  TUDORZA PRESSAIR 400 MCG/ACT aerosol powder  powder for inhalation, inhale 1 puff by mouth twice a day, Disp: 1 each, Rfl: 3  •  VOLTAREN 1 % gel gel, apply 2 grams to AFFECTED FINGER three times a day if needed, Disp: , Rfl: 0  •  zolpidem CR (AMBIEN CR) 12.5 MG CR tablet, Daily., Disp: , Rfl: 0        Rylan was seen today for hyperlipidemia and hypertension.    Diagnoses and all orders for this visit:    PVD (peripheral vascular disease) (CMS/HCC)    Essential hypertension  -     CBC (No Diff)    Other hyperlipidemia    Atherosclerosis of native coronary artery of native heart without angina pectoris    Abdominal aortic aneurysm (AAA) without rupture (CMS/HCC)    Other emphysema (CMS/HCC)    Diverticulosis of large intestine without hemorrhage    Neuropathy    Arthralgia of hip, unspecified laterality    Generalized osteoarthritis    Enlarged prostate without lower urinary tract symptoms (luts)    Chronic kidney disease, stage III (moderate) (CMS/HCC)  -     PTH, Intact  -     Renal Function Panel  -     Vitamin D 25 Hydroxy  -     Urinalysis With Microscopic - Urine, Clean Catch    Tubular adenoma    Insomnia,  unspecified type    Anxiety

## 2019-02-27 LAB
25(OH)D3+25(OH)D2 SERPL-MCNC: 51.9 NG/ML (ref 30–100)
ALBUMIN SERPL-MCNC: 4.4 G/DL (ref 3.5–4.7)
BUN SERPL-MCNC: 26 MG/DL (ref 8–27)
BUN/CREAT SERPL: 17 (ref 10–24)
CALCIUM SERPL-MCNC: 9.3 MG/DL (ref 8.6–10.2)
CHLORIDE SERPL-SCNC: 103 MMOL/L (ref 96–106)
CO2 SERPL-SCNC: 22 MMOL/L (ref 20–29)
CREAT SERPL-MCNC: 1.56 MG/DL (ref 0.76–1.27)
ERYTHROCYTE [DISTWIDTH] IN BLOOD BY AUTOMATED COUNT: 14.4 % (ref 12.3–15.4)
GLUCOSE SERPL-MCNC: 105 MG/DL (ref 65–99)
HCT VFR BLD AUTO: 42 % (ref 37.5–51)
HGB BLD-MCNC: 13.9 G/DL (ref 13–17.7)
MCH RBC QN AUTO: 29.9 PG (ref 26.6–33)
MCHC RBC AUTO-ENTMCNC: 33.1 G/DL (ref 31.5–35.7)
MCV RBC AUTO: 90 FL (ref 79–97)
PHOSPHATE SERPL-MCNC: 3.1 MG/DL (ref 2.5–4.5)
PLATELET # BLD AUTO: 224 X10E3/UL (ref 150–379)
POTASSIUM SERPL-SCNC: 4.6 MMOL/L (ref 3.5–5.2)
PTH-INTACT SERPL-MCNC: NORMAL PG/ML
RBC # BLD AUTO: 4.65 X10E6/UL (ref 4.14–5.8)
SODIUM SERPL-SCNC: 142 MMOL/L (ref 134–144)
SPECIMEN STATUS: NORMAL
WBC # BLD AUTO: 7.4 X10E3/UL (ref 3.4–10.8)

## 2019-03-01 LAB
HBA1C MFR BLD: 5.5 % (ref 4.8–5.6)
WRITTEN AUTHORIZATION: NORMAL

## 2019-05-21 RX ORDER — CLONAZEPAM 0.5 MG/1
TABLET ORAL
Qty: 60 TABLET | Refills: 1 | Status: SHIPPED | OUTPATIENT
Start: 2019-05-21 | End: 2019-10-23 | Stop reason: SDUPTHER

## 2019-05-30 RX ORDER — AMLODIPINE BESYLATE 2.5 MG/1
TABLET ORAL
Qty: 90 TABLET | Refills: 1 | Status: SHIPPED | OUTPATIENT
Start: 2019-05-30 | End: 2019-09-26 | Stop reason: SDUPTHER

## 2019-06-26 ENCOUNTER — OFFICE VISIT (OUTPATIENT)
Dept: INTERNAL MEDICINE | Facility: CLINIC | Age: 84
End: 2019-06-26

## 2019-06-26 VITALS
HEIGHT: 69 IN | WEIGHT: 160 LBS | HEART RATE: 68 BPM | SYSTOLIC BLOOD PRESSURE: 132 MMHG | DIASTOLIC BLOOD PRESSURE: 60 MMHG | BODY MASS INDEX: 23.7 KG/M2

## 2019-06-26 DIAGNOSIS — D36.9 TUBULAR ADENOMA: ICD-10-CM

## 2019-06-26 DIAGNOSIS — K57.30 DIVERTICULOSIS OF LARGE INTESTINE WITHOUT HEMORRHAGE: ICD-10-CM

## 2019-06-26 DIAGNOSIS — I73.9 PVD (PERIPHERAL VASCULAR DISEASE) (HCC): Primary | ICD-10-CM

## 2019-06-26 DIAGNOSIS — I71.40 ABDOMINAL AORTIC ANEURYSM (AAA) WITHOUT RUPTURE (HCC): ICD-10-CM

## 2019-06-26 DIAGNOSIS — N18.30 CHRONIC KIDNEY DISEASE, STAGE III (MODERATE) (HCC): ICD-10-CM

## 2019-06-26 DIAGNOSIS — G62.9 NEUROPATHY: ICD-10-CM

## 2019-06-26 DIAGNOSIS — I10 ESSENTIAL HYPERTENSION: ICD-10-CM

## 2019-06-26 DIAGNOSIS — I25.10 ATHEROSCLEROSIS OF NATIVE CORONARY ARTERY OF NATIVE HEART WITHOUT ANGINA PECTORIS: ICD-10-CM

## 2019-06-26 DIAGNOSIS — N40.0 ENLARGED PROSTATE WITHOUT LOWER URINARY TRACT SYMPTOMS (LUTS): ICD-10-CM

## 2019-06-26 DIAGNOSIS — F41.9 ANXIETY: ICD-10-CM

## 2019-06-26 DIAGNOSIS — M15.9 GENERALIZED OSTEOARTHRITIS: ICD-10-CM

## 2019-06-26 DIAGNOSIS — G47.00 INSOMNIA, UNSPECIFIED TYPE: ICD-10-CM

## 2019-06-26 DIAGNOSIS — J43.8 OTHER EMPHYSEMA (HCC): ICD-10-CM

## 2019-06-26 DIAGNOSIS — E78.49 OTHER HYPERLIPIDEMIA: ICD-10-CM

## 2019-06-26 PROCEDURE — 99214 OFFICE O/P EST MOD 30 MIN: CPT | Performed by: INTERNAL MEDICINE

## 2019-06-26 NOTE — PROGRESS NOTES
Patient is a 83 y.o. male who is here for a follow up of chronic conditions.  Chief Complaint   Patient presents with   • Hyperlipidemia   • Hypertension         HPI:    Here for f/u.  Recently seen by Urology and found to elevated BP.  Onset about 7 days ago.  No dizziness or lightheadedness.  No HAs.  Feels fatigued.  No CP.  No abdominal pains.  No edema.     History:     Patient Active Problem List   Diagnosis   • Anxiety   • Aortic aneurysm (CMS/HCC)   • Atherosclerotic heart disease of native coronary artery without angina pectoris   • Chronic obstructive pulmonary disease (CMS/HCC)   • Chronic kidney disease, stage III (moderate) (CMS/HCC)   • Diverticulosis of large intestine   • Enlarged prostate without lower urinary tract symptoms (luts)   • Generalized osteoarthritis   • Hyperlipidemia   • Hypertension   • Insomnia   • Neuropathy   • PVD (peripheral vascular disease) (CMS/HCC)   • Tubular adenoma   • Absence of kidney   • Arthralgia of hip       Past Medical History:   Diagnosis Date   • Colon polyps    • Ecchymosis    • Heart attack (CMS/HCC)        Past Surgical History:   Procedure Laterality Date   • ROTATOR CUFF REPAIR Right 2008   • TONSILLECTOMY         Current Outpatient Medications on File Prior to Visit   Medication Sig   • amLODIPine (NORVASC) 2.5 MG tablet take 1 tablet by mouth once daily   • aspirin 81 MG tablet Take  by mouth Daily.   • carvedilol (COREG) 12.5 MG tablet 2 (Two) Times a Day.   • clonazePAM (KlonoPIN) 0.5 MG tablet take 1 to 2 tablets by mouth at bedtime if needed   • CRESTOR 20 MG tablet Every Night.   • DULoxetine (CYMBALTA) 60 MG capsule Take 1 capsule by mouth Daily.   • dutasteride (AVODART) 0.5 MG capsule Daily.   • fluticasone (FLONASE) 50 MCG/ACT nasal spray instill 1 spray into each nostril twice a day   • paricalcitol (ZEMPLAR) 1 MCG capsule    • sulfaSALAzine (AZULFIDINE) 500 MG tablet 2 (Two) Times a Day.   • tamsulosin (FLOMAX) 0.4 MG capsule 24 hr capsule 2  "(Two) Times a Day.   • TUDORZA PRESSAIR 400 MCG/ACT aerosol powder  powder for inhalation inhale 1 puff by mouth twice a day   • VOLTAREN 1 % gel gel apply 2 grams to AFFECTED FINGER three times a day if needed   • zolpidem CR (AMBIEN CR) 12.5 MG CR tablet Daily.     No current facility-administered medications on file prior to visit.        Family History   Problem Relation Age of Onset   • Cancer Other    • Heart attack Other    • Hypertension Other    • Hyperlipidemia Other        Social History     Socioeconomic History   • Marital status:      Spouse name: Not on file   • Number of children: Not on file   • Years of education: Not on file   • Highest education level: Not on file   Tobacco Use   • Smoking status: Former Smoker         Review of Systems   Constitutional: Negative for chills, diaphoresis, fever and unexpected weight change.   HENT: Negative for congestion, ear pain, hearing loss, nosebleeds, postnasal drip, sinus pressure and sore throat.    Eyes: Negative for pain, discharge and itching.   Respiratory: Positive for shortness of breath. Negative for cough, chest tightness and wheezing.    Cardiovascular: Negative for chest pain, palpitations and leg swelling.   Gastrointestinal: Negative for abdominal distention, abdominal pain, blood in stool, constipation, diarrhea, nausea and vomiting.        5/15 colonoscopy with TA  7/18 \" \"   Endocrine: Negative for polydipsia and polyuria.   Genitourinary: Negative for difficulty urinating, dysuria, frequency and hematuria.        Followed by urology   Musculoskeletal: Positive for arthralgias and back pain. Negative for gait problem, joint swelling and myalgias.   Skin: Negative for rash and wound.   Neurological: Negative for dizziness, syncope and headaches.   Psychiatric/Behavioral: Positive for sleep disturbance. Negative for dysphoric mood. The patient is not nervous/anxious.        /60   Pulse 68   Ht 175.3 cm (69.02\")   Wt 72.6 kg " (160 lb)   BMI 23.62 kg/m²       Physical Exam   Constitutional: He is oriented to person, place, and time. He appears well-developed and well-nourished.   HENT:   Head: Normocephalic and atraumatic.   Right Ear: External ear normal.   Left Ear: External ear normal.   Mouth/Throat: Oropharynx is clear and moist.   Eyes: Conjunctivae and EOM are normal.   Neck: Normal range of motion. Neck supple.   Cardiovascular: Normal rate, regular rhythm and normal heart sounds.   Pulmonary/Chest: Effort normal and breath sounds normal.   Abdominal: Soft. Bowel sounds are normal.   Musculoskeletal: Normal range of motion.   Lymphadenopathy:     He has no cervical adenopathy.   Neurological: He is alert and oriented to person, place, and time.   Skin: Skin is warm and dry.   Psychiatric: He has a normal mood and affect. His behavior is normal. Thought content normal.       Procedure:      Discussion/Summary:    htn-stable, reading good today  cri-stable, labs noted  copd-stable on Tudorza  hyperlipidemia-labs noted, counseled on diet  bph-Urology noted  diverticulosis-citrucel qd   TA-colonoscopy noted, needs scope 7/23  cad/pvd-cont rf mod, stable  insomnia-change to klonopine qhs prn, advised of risk  djd/neuropathy-improved with cymbalta, rf norco, counseled on risk of addiction  left hip pain-improved, MRI noted      labs noted and dw patient  Advised to increase fluids        Current Outpatient Medications:   •  amLODIPine (NORVASC) 2.5 MG tablet, take 1 tablet by mouth once daily, Disp: 90 tablet, Rfl: 1  •  aspirin 81 MG tablet, Take  by mouth Daily., Disp: , Rfl:   •  carvedilol (COREG) 12.5 MG tablet, 2 (Two) Times a Day., Disp: , Rfl: 0  •  clonazePAM (KlonoPIN) 0.5 MG tablet, take 1 to 2 tablets by mouth at bedtime if needed, Disp: 60 tablet, Rfl: 1  •  CRESTOR 20 MG tablet, Every Night., Disp: , Rfl: 0  •  DULoxetine (CYMBALTA) 60 MG capsule, Take 1 capsule by mouth Daily., Disp: 90 capsule, Rfl: 3  •  dutasteride  (AVODART) 0.5 MG capsule, Daily., Disp: , Rfl: 0  •  fluticasone (FLONASE) 50 MCG/ACT nasal spray, instill 1 spray into each nostril twice a day, Disp: 1 bottle, Rfl: 3  •  paricalcitol (ZEMPLAR) 1 MCG capsule, , Disp: , Rfl: 0  •  sulfaSALAzine (AZULFIDINE) 500 MG tablet, 2 (Two) Times a Day., Disp: , Rfl: 0  •  tamsulosin (FLOMAX) 0.4 MG capsule 24 hr capsule, 2 (Two) Times a Day., Disp: , Rfl: 0  •  TUDORZA PRESSAIR 400 MCG/ACT aerosol powder  powder for inhalation, inhale 1 puff by mouth twice a day, Disp: 1 each, Rfl: 3  •  VOLTAREN 1 % gel gel, apply 2 grams to AFFECTED FINGER three times a day if needed, Disp: , Rfl: 0  •  zolpidem CR (AMBIEN CR) 12.5 MG CR tablet, Daily., Disp: , Rfl: 0        Rylan was seen today for hyperlipidemia and hypertension.    Diagnoses and all orders for this visit:    PVD (peripheral vascular disease) (CMS/HCC)    Essential hypertension    Other hyperlipidemia    Atherosclerosis of native coronary artery of native heart without angina pectoris    Abdominal aortic aneurysm (AAA) without rupture (CMS/HCC)    Other emphysema (CMS/HCC)    Diverticulosis of large intestine without hemorrhage    Neuropathy    Generalized osteoarthritis    Enlarged prostate without lower urinary tract symptoms (luts)    Chronic kidney disease, stage III (moderate) (CMS/HCC)    Tubular adenoma    Insomnia, unspecified type    Anxiety

## 2019-06-27 ENCOUNTER — HOSPITAL ENCOUNTER (OUTPATIENT)
Dept: GENERAL RADIOLOGY | Facility: HOSPITAL | Age: 84
Discharge: HOME | End: 2019-06-27
Admitting: INTERNAL MEDICINE

## 2019-06-27 DIAGNOSIS — G62.9 NEUROPATHY: ICD-10-CM

## 2019-06-27 DIAGNOSIS — J43.8 OTHER EMPHYSEMA (HCC): Primary | ICD-10-CM

## 2019-06-27 PROCEDURE — 71046 X-RAY EXAM CHEST 2 VIEWS: CPT

## 2019-06-27 RX ORDER — DULOXETIN HYDROCHLORIDE 30 MG/1
30 CAPSULE, DELAYED RELEASE ORAL DAILY
Qty: 30 CAPSULE | Refills: 2 | Status: SHIPPED | OUTPATIENT
Start: 2019-06-27 | End: 2019-08-16 | Stop reason: SDUPTHER

## 2019-07-15 RX ORDER — ACLIDINIUM BROMIDE 400 UG/1
POWDER, METERED RESPIRATORY (INHALATION)
Qty: 1 EACH | Refills: 3 | Status: SHIPPED | OUTPATIENT
Start: 2019-07-15 | End: 2019-10-14 | Stop reason: SDUPTHER

## 2019-08-15 RX ORDER — TAMSULOSIN HYDROCHLORIDE 0.4 MG/1
1 CAPSULE ORAL 2 TIMES DAILY
Qty: 180 CAPSULE | Refills: 2 | Status: SHIPPED | OUTPATIENT
Start: 2019-08-15 | End: 2020-02-10

## 2019-08-16 ENCOUNTER — TELEPHONE (OUTPATIENT)
Dept: INTERNAL MEDICINE | Facility: CLINIC | Age: 84
End: 2019-08-16

## 2019-08-16 DIAGNOSIS — G62.9 NEUROPATHY: ICD-10-CM

## 2019-08-16 RX ORDER — DULOXETIN HYDROCHLORIDE 30 MG/1
30 CAPSULE, DELAYED RELEASE ORAL DAILY
Qty: 30 CAPSULE | Refills: 2 | Status: SHIPPED | OUTPATIENT
Start: 2019-08-16 | End: 2019-09-27 | Stop reason: DRUGHIGH

## 2019-09-26 ENCOUNTER — OFFICE VISIT (OUTPATIENT)
Dept: INTERNAL MEDICINE | Facility: CLINIC | Age: 84
End: 2019-09-26

## 2019-09-26 VITALS
HEIGHT: 69 IN | HEART RATE: 60 BPM | WEIGHT: 160 LBS | SYSTOLIC BLOOD PRESSURE: 126 MMHG | BODY MASS INDEX: 23.7 KG/M2 | DIASTOLIC BLOOD PRESSURE: 70 MMHG

## 2019-09-26 DIAGNOSIS — Z23 NEED FOR INFLUENZA VACCINATION: ICD-10-CM

## 2019-09-26 DIAGNOSIS — I73.9 PVD (PERIPHERAL VASCULAR DISEASE) (HCC): Primary | ICD-10-CM

## 2019-09-26 DIAGNOSIS — M15.9 GENERALIZED OSTEOARTHRITIS: ICD-10-CM

## 2019-09-26 DIAGNOSIS — I10 ESSENTIAL HYPERTENSION: ICD-10-CM

## 2019-09-26 DIAGNOSIS — E78.49 OTHER HYPERLIPIDEMIA: ICD-10-CM

## 2019-09-26 DIAGNOSIS — K57.30 DIVERTICULOSIS OF LARGE INTESTINE WITHOUT HEMORRHAGE: ICD-10-CM

## 2019-09-26 DIAGNOSIS — N40.0 ENLARGED PROSTATE WITHOUT LOWER URINARY TRACT SYMPTOMS (LUTS): ICD-10-CM

## 2019-09-26 DIAGNOSIS — G62.9 NEUROPATHY: ICD-10-CM

## 2019-09-26 DIAGNOSIS — G47.00 INSOMNIA, UNSPECIFIED TYPE: ICD-10-CM

## 2019-09-26 DIAGNOSIS — D36.9 TUBULAR ADENOMA: ICD-10-CM

## 2019-09-26 DIAGNOSIS — J43.8 OTHER EMPHYSEMA (HCC): ICD-10-CM

## 2019-09-26 DIAGNOSIS — N18.30 CHRONIC KIDNEY DISEASE, STAGE III (MODERATE) (HCC): ICD-10-CM

## 2019-09-26 DIAGNOSIS — I25.10 ATHEROSCLEROSIS OF NATIVE CORONARY ARTERY OF NATIVE HEART WITHOUT ANGINA PECTORIS: ICD-10-CM

## 2019-09-26 PROCEDURE — G0008 ADMIN INFLUENZA VIRUS VAC: HCPCS | Performed by: INTERNAL MEDICINE

## 2019-09-26 PROCEDURE — 90653 IIV ADJUVANT VACCINE IM: CPT | Performed by: INTERNAL MEDICINE

## 2019-09-26 PROCEDURE — 99214 OFFICE O/P EST MOD 30 MIN: CPT | Performed by: INTERNAL MEDICINE

## 2019-09-26 RX ORDER — AMLODIPINE BESYLATE 2.5 MG/1
2.5 TABLET ORAL NIGHTLY
Qty: 90 TABLET | Refills: 3 | Status: SHIPPED | OUTPATIENT
Start: 2019-09-26 | End: 2020-08-26

## 2019-09-26 NOTE — PROGRESS NOTES
Patient is a 83 y.o. male who is here for a follow up of hyperlipidemia and hypertension.  Chief Complaint   Patient presents with   • Hyperlipidemia   • Hypertension         HPI:    Here for mgmt of HTN.  No dizziness or lightheadedness.  No CP.  No edema.  No palpitations.  Better uop.  No fever or chills.  Occasionally gets some night sweats.  Breathing well.  Occasional cough.  Sleeping well.      History:     Patient Active Problem List   Diagnosis   • Anxiety   • Aortic aneurysm (CMS/HCC)   • Atherosclerotic heart disease of native coronary artery without angina pectoris   • Chronic obstructive pulmonary disease (CMS/HCC)   • Chronic kidney disease, stage III (moderate) (CMS/HCC)   • Diverticulosis of large intestine   • Enlarged prostate without lower urinary tract symptoms (luts)   • Generalized osteoarthritis   • Hyperlipidemia   • Hypertension   • Insomnia   • Neuropathy   • PVD (peripheral vascular disease) (CMS/[facility])   • Tubular adenoma   • Absence of kidney   • Arthralgia of hip       Past Medical History:   Diagnosis Date   • Colon polyps    • Ecchymosis    • Heart attack (CMS/HCC)        Past Surgical History:   Procedure Laterality Date   • ROTATOR CUFF REPAIR Right 2008   • TONSILLECTOMY         Current Outpatient Medications on File Prior to Visit   Medication Sig   • aspirin 81 MG tablet Take  by mouth Daily.   • carvedilol (COREG) 12.5 MG tablet 2 (Two) Times a Day.   • clonazePAM (KlonoPIN) 0.5 MG tablet take 1 to 2 tablets by mouth at bedtime if needed   • CRESTOR 20 MG tablet Every Night.   • DULoxetine (CYMBALTA) 30 MG capsule Take 1 capsule by mouth Daily.   • dutasteride (AVODART) 0.5 MG capsule Daily.   • fluticasone (FLONASE) 50 MCG/ACT nasal spray instill 1 spray into each nostril twice a day   • paricalcitol (ZEMPLAR) 1 MCG capsule    • sulfaSALAzine (AZULFIDINE) 500 MG tablet 2 (Two) Times a Day.   • tamsulosin (FLOMAX) 0.4 MG capsule 24 hr capsule Take 1 capsule by mouth 2 (Two) Times  "a Day.   • TUDORZA PRESSAIR 400 MCG/ACT aerosol powder  powder for inhalation INHALE 1 PUFF TWICE A DAY   • VOLTAREN 1 % gel gel apply 2 grams to AFFECTED FINGER three times a day if needed   • zolpidem CR (AMBIEN CR) 12.5 MG CR tablet Daily.   • [DISCONTINUED] amLODIPine (NORVASC) 2.5 MG tablet take 1 tablet by mouth once daily     No current facility-administered medications on file prior to visit.        Family History   Problem Relation Age of Onset   • Cancer Other    • Heart attack Other    • Hypertension Other    • Hyperlipidemia Other        Social History     Socioeconomic History   • Marital status:      Spouse name: Not on file   • Number of children: Not on file   • Years of education: Not on file   • Highest education level: Not on file   Tobacco Use   • Smoking status: Former Smoker         Review of Systems   Constitutional: Negative for chills, diaphoresis, fever and unexpected weight change.   HENT: Negative for congestion, ear pain, hearing loss, nosebleeds, postnasal drip, sinus pressure and sore throat.    Eyes: Negative for pain, discharge and itching.   Respiratory: Positive for shortness of breath. Negative for cough, chest tightness and wheezing.    Cardiovascular: Negative for chest pain, palpitations and leg swelling.   Gastrointestinal: Negative for abdominal distention, abdominal pain, blood in stool, constipation, diarrhea, nausea and vomiting.        5/15 colonoscopy with TA  7/18 \" \"   Endocrine: Negative for polydipsia and polyuria.   Genitourinary: Negative for difficulty urinating, dysuria, frequency and hematuria (better).        Followed by urology   Musculoskeletal: Positive for arthralgias and back pain. Negative for gait problem, joint swelling and myalgias.   Skin: Negative for rash and wound.   Neurological: Negative for dizziness, syncope and headaches.   Psychiatric/Behavioral: Positive for sleep disturbance. Negative for dysphoric mood. The patient is not " "nervous/anxious.        /70   Pulse 60   Ht 175.3 cm (69.02\")   Wt 72.6 kg (160 lb)   BMI 23.62 kg/m²       Physical Exam   Constitutional: He is oriented to person, place, and time. He appears well-developed and well-nourished.   HENT:   Head: Normocephalic and atraumatic.   Right Ear: External ear normal.   Left Ear: External ear normal.   Mouth/Throat: Oropharynx is clear and moist.   Eyes: Conjunctivae and EOM are normal.   Neck: Normal range of motion. Neck supple.   Cardiovascular: Normal rate, regular rhythm and normal heart sounds.   Pulmonary/Chest: Effort normal and breath sounds normal.   Abdominal: Soft. Bowel sounds are normal.   Musculoskeletal: Normal range of motion.   Lymphadenopathy:     He has no cervical adenopathy.   Neurological: He is alert and oriented to person, place, and time.   Skin: Skin is warm and dry.   Psychiatric: He has a normal mood and affect. His behavior is normal. Thought content normal.       Procedure:      Discussion/Summary:    htn-stable on CCB and BB  Cri-labs today, stble  copd-stable on Tudorza  hyperlipidemia-labs today at goal on Crestor, counseled on diet  bph-Urology noted  diverticulosis-citrucel qd , stable  TA-colonoscopy noted, needs scope 7/23  cad/pvd-cont rf mod, stable  insomnia-cont klonopine qhs prn, advised of risk  djd/neuropathy-improved with cymbalta, rf norco prn, counseled on risk of addiction      9/26 labs noted and dw patient  Flu shot today      Current Outpatient Medications:   •  amLODIPine (NORVASC) 2.5 MG tablet, Take 1 tablet by mouth Every Night., Disp: 90 tablet, Rfl: 3  •  aspirin 81 MG tablet, Take  by mouth Daily., Disp: , Rfl:   •  carvedilol (COREG) 12.5 MG tablet, 2 (Two) Times a Day., Disp: , Rfl: 0  •  clonazePAM (KlonoPIN) 0.5 MG tablet, take 1 to 2 tablets by mouth at bedtime if needed, Disp: 60 tablet, Rfl: 1  •  CRESTOR 20 MG tablet, Every Night., Disp: , Rfl: 0  •  DULoxetine (CYMBALTA) 30 MG capsule, Take 1 capsule " by mouth Daily., Disp: 30 capsule, Rfl: 2  •  dutasteride (AVODART) 0.5 MG capsule, Daily., Disp: , Rfl: 0  •  fluticasone (FLONASE) 50 MCG/ACT nasal spray, instill 1 spray into each nostril twice a day, Disp: 1 bottle, Rfl: 3  •  paricalcitol (ZEMPLAR) 1 MCG capsule, , Disp: , Rfl: 0  •  sulfaSALAzine (AZULFIDINE) 500 MG tablet, 2 (Two) Times a Day., Disp: , Rfl: 0  •  tamsulosin (FLOMAX) 0.4 MG capsule 24 hr capsule, Take 1 capsule by mouth 2 (Two) Times a Day., Disp: 180 capsule, Rfl: 2  •  TUDORZA PRESSAIR 400 MCG/ACT aerosol powder  powder for inhalation, INHALE 1 PUFF TWICE A DAY, Disp: 1 each, Rfl: 3  •  VOLTAREN 1 % gel gel, apply 2 grams to AFFECTED FINGER three times a day if needed, Disp: , Rfl: 0  •  zolpidem CR (AMBIEN CR) 12.5 MG CR tablet, Daily., Disp: , Rfl: 0        Rylan was seen today for hyperlipidemia and hypertension.    Diagnoses and all orders for this visit:    PVD (peripheral vascular disease) (CMS/HCC)    Essential hypertension  -     amLODIPine (NORVASC) 2.5 MG tablet; Take 1 tablet by mouth Every Night.  -     CBC (No Diff)    Other hyperlipidemia  -     Comprehensive Metabolic Panel  -     Lipid Panel  -     TSH    Atherosclerosis of native coronary artery of native heart without angina pectoris    Other emphysema (CMS/HCC)    Diverticulosis of large intestine without hemorrhage    Neuropathy    Generalized osteoarthritis    Enlarged prostate without lower urinary tract symptoms (luts)    Chronic kidney disease, stage III (moderate) (CMS/HCC)    Insomnia, unspecified type    Tubular adenoma    Need for influenza vaccination  -     Fluad Tri 65yr+

## 2019-09-27 ENCOUNTER — TELEPHONE (OUTPATIENT)
Dept: INTERNAL MEDICINE | Facility: CLINIC | Age: 84
End: 2019-09-27

## 2019-09-27 DIAGNOSIS — G62.9 NEUROPATHY: ICD-10-CM

## 2019-09-27 LAB
ALBUMIN SERPL-MCNC: 4.5 G/DL (ref 3.5–5.2)
ALBUMIN/GLOB SERPL: 2 G/DL
ALP SERPL-CCNC: 72 U/L (ref 39–117)
ALT SERPL-CCNC: 13 U/L (ref 1–41)
AST SERPL-CCNC: 11 U/L (ref 1–40)
BILIRUB SERPL-MCNC: 0.3 MG/DL (ref 0.2–1.2)
BUN SERPL-MCNC: 27 MG/DL (ref 8–23)
BUN/CREAT SERPL: 16.3 (ref 7–25)
CALCIUM SERPL-MCNC: 9.3 MG/DL (ref 8.6–10.5)
CHLORIDE SERPL-SCNC: 107 MMOL/L (ref 98–107)
CHOLEST SERPL-MCNC: 158 MG/DL (ref 0–200)
CO2 SERPL-SCNC: 25.4 MMOL/L (ref 22–29)
CREAT SERPL-MCNC: 1.66 MG/DL (ref 0.76–1.27)
ERYTHROCYTE [DISTWIDTH] IN BLOOD BY AUTOMATED COUNT: 13.3 % (ref 12.3–15.4)
GLOBULIN SER CALC-MCNC: 2.3 GM/DL
GLUCOSE SERPL-MCNC: 101 MG/DL (ref 65–99)
HCT VFR BLD AUTO: 44.5 % (ref 37.5–51)
HDLC SERPL-MCNC: 47 MG/DL (ref 40–60)
HGB BLD-MCNC: 14.4 G/DL (ref 13–17.7)
LDLC SERPL CALC-MCNC: 83 MG/DL (ref 0–100)
MCH RBC QN AUTO: 30.3 PG (ref 26.6–33)
MCHC RBC AUTO-ENTMCNC: 32.4 G/DL (ref 31.5–35.7)
MCV RBC AUTO: 93.7 FL (ref 79–97)
PLATELET # BLD AUTO: 229 10*3/MM3 (ref 140–450)
POTASSIUM SERPL-SCNC: 5.1 MMOL/L (ref 3.5–5.2)
PROT SERPL-MCNC: 6.8 G/DL (ref 6–8.5)
RBC # BLD AUTO: 4.75 10*6/MM3 (ref 4.14–5.8)
SODIUM SERPL-SCNC: 145 MMOL/L (ref 136–145)
TRIGL SERPL-MCNC: 140 MG/DL (ref 0–150)
TSH SERPL DL<=0.005 MIU/L-ACNC: 0.79 UIU/ML (ref 0.27–4.2)
VLDLC SERPL CALC-MCNC: 28 MG/DL
WBC # BLD AUTO: 8.31 10*3/MM3 (ref 3.4–10.8)

## 2019-09-27 RX ORDER — DULOXETIN HYDROCHLORIDE 60 MG/1
60 CAPSULE, DELAYED RELEASE ORAL DAILY
Qty: 90 CAPSULE | Refills: 1 | Status: SHIPPED | OUTPATIENT
Start: 2019-09-27 | End: 2020-03-18

## 2019-09-27 NOTE — TELEPHONE ENCOUNTER
STEFAN, PLEASE CALL IN HIS CYMBALTA 60MG HE WAS TAKING 30MG BUT DR. GARCIA SAID HE WAS UPPING IT. [facility]

## 2019-10-14 ENCOUNTER — TELEPHONE (OUTPATIENT)
Dept: INTERNAL MEDICINE | Facility: CLINIC | Age: 84
End: 2019-10-14

## 2019-10-23 RX ORDER — CLONAZEPAM 0.5 MG/1
TABLET ORAL
Qty: 60 TABLET | Refills: 1 | Status: SHIPPED | OUTPATIENT
Start: 2019-10-23 | End: 2020-02-28

## 2020-01-02 NOTE — TELEPHONE ENCOUNTER
Ibuprofen as needed for pain   Ice 20 min every 2-3 hours   Use a donut pillow for comfort     Coccyx Injury   WHAT YOU NEED TO KNOW:   A coccyx (tailbone) injury is when your coccyx breaks, dislocates, or is not stable  The coccyx is a small bone shaped like a triangle that forms the bottom of your spine  DISCHARGE INSTRUCTIONS:   Medicines: You may need any of the following:  · NSAIDs  decrease swelling and pain or fever  This medicine can be bought with or without a doctor's order  This medicine can cause stomach bleeding or kidney problems in certain people  If you take blood thinner medicine, always ask your healthcare provider if NSAIDs are safe for you  Always read the medicine label and follow the directions on it before using this medicine  · Prescription pain medicine  may be given to decrease pain  Do not wait until the pain is severe before you take this medicine  · A bowel movement softener  makes it easier and less painful for you to have a bowel movement  · Take your medicine as directed  Contact your healthcare provider if you think your medicine is not helping or if you have side effects  Tell him if you are allergic to any medicine  Keep a list of the medicines, vitamins, and herbs you take  Include the amounts, and when and why you take them  Bring the list or the pill bottles to follow-up visits  Carry your medicine list with you in case of an emergency  Self-care:   · Use a donut-shaped cushion  to decrease pain and support your coccyx when you sit  · Ice  helps decrease swelling and pain  Ice may also help prevent tissue damage  Use an ice pack, or put crushed ice in a plastic bag  Cover it with a towel and place it on your coccyx for 15 to 20 minutes every hour or as directed  · Sleep  on a firm mattress  Place a pillow under your knees if you sleep on your back  Or, sleep on your side with a pillow between your knees   This will decrease pain and tension in your coccyx and Made appt   back   Follow up with your healthcare provider as directed:  Write down your questions so you remember to ask them during your visits  Contact your healthcare provider if:   · You have trouble urinating or having a bowel movement  · Your pain or swelling get worse or do not go away with treatment  · You have a fever  · You have questions or concerns about your condition or care  Return to the emergency department if:   · You have trouble breathing  · You cannot move your legs  · Your legs suddenly go numb  · You have severe pain  © 2017 [facility] Information is for End User's use only and may not be sold, redistributed or otherwise used for commercial purposes  All illustrations and images included in CareNotes® are the copyrighted property of A D A M [employer]  or Mike Vy[employer]  The above information is an  only  It is not intended as medical advice for individual conditions or treatments  Talk to your doctor, nurse or pharmacist before following any medical regimen to see if it is safe and effective for you

## 2020-01-03 ENCOUNTER — TELEPHONE (OUTPATIENT)
Dept: INTERNAL MEDICINE | Facility: CLINIC | Age: 85
End: 2020-01-03

## 2020-01-03 NOTE — TELEPHONE ENCOUNTER
Pt called wanting to know if he needs to come in and see Dr Macias or if he needs to see a throat Dr. Pt said that he cant hardly speak.  Please call and advise

## 2020-01-27 ENCOUNTER — OFFICE VISIT (OUTPATIENT)
Dept: INTERNAL MEDICINE | Facility: CLINIC | Age: 85
End: 2020-01-27

## 2020-01-27 VITALS
DIASTOLIC BLOOD PRESSURE: 70 MMHG | HEIGHT: 69 IN | HEART RATE: 64 BPM | SYSTOLIC BLOOD PRESSURE: 130 MMHG | WEIGHT: 162 LBS | BODY MASS INDEX: 23.99 KG/M2

## 2020-01-27 DIAGNOSIS — K57.30 DIVERTICULOSIS OF LARGE INTESTINE WITHOUT HEMORRHAGE: ICD-10-CM

## 2020-01-27 DIAGNOSIS — I10 ESSENTIAL HYPERTENSION: ICD-10-CM

## 2020-01-27 DIAGNOSIS — J43.8 OTHER EMPHYSEMA (HCC): ICD-10-CM

## 2020-01-27 DIAGNOSIS — D36.9 TUBULAR ADENOMA: ICD-10-CM

## 2020-01-27 DIAGNOSIS — N40.0 ENLARGED PROSTATE WITHOUT LOWER URINARY TRACT SYMPTOMS (LUTS): ICD-10-CM

## 2020-01-27 DIAGNOSIS — G47.00 INSOMNIA, UNSPECIFIED TYPE: ICD-10-CM

## 2020-01-27 DIAGNOSIS — F41.9 ANXIETY: ICD-10-CM

## 2020-01-27 DIAGNOSIS — G62.9 NEUROPATHY: ICD-10-CM

## 2020-01-27 DIAGNOSIS — N18.30 CHRONIC KIDNEY DISEASE, STAGE III (MODERATE) (HCC): ICD-10-CM

## 2020-01-27 DIAGNOSIS — E78.49 OTHER HYPERLIPIDEMIA: ICD-10-CM

## 2020-01-27 DIAGNOSIS — I73.9 PVD (PERIPHERAL VASCULAR DISEASE) (HCC): Primary | ICD-10-CM

## 2020-01-27 PROCEDURE — 99214 OFFICE O/P EST MOD 30 MIN: CPT | Performed by: INTERNAL MEDICINE

## 2020-01-27 NOTE — PROGRESS NOTES
Patient is a 84 y.o. male who is here for a follow up of hyperlipidemia and hypertension.  Chief Complaint   Patient presents with   • Hyperlipidemia   • Hypertension         HPI:    Here for mgmt of HTN/CKD and insomnia.  Onset years.  BP readings are controlled.  No dizziness or lightheadedness. No HAs.  No edema.  No falls.  No LE edema.  Appetite is good.  No weight changes.  Sleeping fair.  Good UOP.  Breathing well.      History:     Patient Active Problem List   Diagnosis   • Anxiety   • Aortic aneurysm (CMS/HCC)   • Atherosclerotic heart disease of native coronary artery without angina pectoris   • Chronic obstructive pulmonary disease (CMS/HCC)   • Chronic kidney disease, stage III (moderate) (CMS/HCC)   • Diverticulosis of large intestine   • Enlarged prostate without lower urinary tract symptoms (luts)   • Generalized osteoarthritis   • Hyperlipidemia   • Hypertension   • Insomnia   • Neuropathy   • PVD (peripheral vascular disease) (CMS/HCC)   • Tubular adenoma   • Absence of kidney   • Arthralgia of hip       Past Medical History:   Diagnosis Date   • Colon polyps    • Ecchymosis    • Heart attack (CMS/HCC)        Past Surgical History:   Procedure Laterality Date   • ROTATOR CUFF REPAIR Right 2008   • TONSILLECTOMY         Current Outpatient Medications on File Prior to Visit   Medication Sig   • aclidinium bromide (TUDORZA PRESSAIR) 400 MCG/ACT aerosol powder  powder for inhalation Inhale 1 puff 2 (Two) Times a Day.   • amLODIPine (NORVASC) 2.5 MG tablet Take 1 tablet by mouth Every Night.   • aspirin 81 MG tablet Take  by mouth Daily.   • carvedilol (COREG) 12.5 MG tablet 2 (Two) Times a Day.   • clonazePAM (KlonoPIN) 0.5 MG tablet 1-2 po qhs prn   • CRESTOR 20 MG tablet Every Night.   • DULoxetine (CYMBALTA) 60 MG capsule Take 1 capsule by mouth Daily.   • dutasteride (AVODART) 0.5 MG capsule Daily.   • fluticasone (FLONASE) 50 MCG/ACT nasal spray instill 1 spray into each nostril twice a day   •  "paricalcitol (ZEMPLAR) 1 MCG capsule    • sulfaSALAzine (AZULFIDINE) 500 MG tablet 2 (Two) Times a Day.   • tamsulosin (FLOMAX) 0.4 MG capsule 24 hr capsule Take 1 capsule by mouth 2 (Two) Times a Day.   • VOLTAREN 1 % gel gel Apply  topically to the appropriate area as directed 3 (Three) Times a Day.   • zolpidem CR (AMBIEN CR) 12.5 MG CR tablet Daily.     No current facility-administered medications on file prior to visit.        Family History   Problem Relation Age of Onset   • Cancer Other    • Heart attack Other    • Hypertension Other    • Hyperlipidemia Other        Social History     Socioeconomic History   • Marital status:      Spouse name: Not on file   • Number of children: Not on file   • Years of education: Not on file   • Highest education level: Not on file   Tobacco Use   • Smoking status: Former Smoker         Review of Systems   Constitutional: Negative for chills, diaphoresis, fever and unexpected weight change.   HENT: Negative for congestion, ear pain, hearing loss, nosebleeds, postnasal drip, sinus pressure and sore throat.    Eyes: Negative for pain, discharge and itching.   Respiratory: Positive for shortness of breath (improved). Negative for cough, chest tightness and wheezing.    Cardiovascular: Negative for chest pain, palpitations and leg swelling.   Gastrointestinal: Negative for abdominal distention, abdominal pain, blood in stool, constipation, diarrhea, nausea and vomiting.        5/15 colonoscopy with TA  7/18 \" \"   Endocrine: Negative for polydipsia and polyuria.   Genitourinary: Negative for difficulty urinating, dysuria, frequency and hematuria.        Followed by urology   Musculoskeletal: Positive for arthralgias and back pain. Negative for gait problem, joint swelling and myalgias.   Skin: Negative for rash and wound.   Neurological: Negative for dizziness, syncope and headaches.   Psychiatric/Behavioral: Positive for sleep disturbance. Negative for dysphoric mood. " "The patient is not nervous/anxious.        /70   Pulse 64   Ht 175.3 cm (69.02\")   Wt 73.5 kg (162 lb)   BMI 23.91 kg/m²       Physical Exam   Constitutional: He is oriented to person, place, and time. He appears well-developed and well-nourished.   HENT:   Head: Normocephalic and atraumatic.   Right Ear: External ear normal.   Left Ear: External ear normal.   Mouth/Throat: Oropharynx is clear and moist.   Eyes: Conjunctivae and EOM are normal.   Neck: Normal range of motion. Neck supple.   Cardiovascular: Normal rate, regular rhythm and normal heart sounds.   Pulmonary/Chest: Effort normal and breath sounds normal.   Abdominal: Soft. Bowel sounds are normal.   Musculoskeletal: Normal range of motion.   Lymphadenopathy:     He has no cervical adenopathy.   Neurological: He is alert and oriented to person, place, and time.   Skin: Skin is warm and dry.   Psychiatric: He has a normal mood and affect. His behavior is normal. Thought content normal.       Procedure:      Discussion/Summary:    htn-stable on CCB and BB  Cri-labs on rtc, Cr is stable  copd-stable on Tudorza  hyperlipidemia-labs pm 9/26 at goal on Crestor, counseled on diet  bph-Urology noted, stable  diverticulosis-citrucel qd , stable  TA-colonoscopy noted, needs scope 7/23  cad/pvd-cont rf mod, stable  insomnia-cont klonopine qhs prn, advised of risk, symptoms controlled  djd/neuropathy-improved with cymbalta, rf norco prn, counseled on risk of addiction      9/26 labs noted and dw patient    Current Outpatient Medications:   •  aclidinium bromide (TUDORZA PRESSAIR) 400 MCG/ACT aerosol powder  powder for inhalation, Inhale 1 puff 2 (Two) Times a Day., Disp: 1 each, Rfl: 3  •  amLODIPine (NORVASC) 2.5 MG tablet, Take 1 tablet by mouth Every Night., Disp: 90 tablet, Rfl: 3  •  aspirin 81 MG tablet, Take  by mouth Daily., Disp: , Rfl:   •  carvedilol (COREG) 12.5 MG tablet, 2 (Two) Times a Day., Disp: , Rfl: 0  •  clonazePAM (KlonoPIN) 0.5 MG " tablet, 1-2 po qhs prn, Disp: 60 tablet, Rfl: 1  •  CRESTOR 20 MG tablet, Every Night., Disp: , Rfl: 0  •  DULoxetine (CYMBALTA) 60 MG capsule, Take 1 capsule by mouth Daily., Disp: 90 capsule, Rfl: 1  •  dutasteride (AVODART) 0.5 MG capsule, Daily., Disp: , Rfl: 0  •  fluticasone (FLONASE) 50 MCG/ACT nasal spray, instill 1 spray into each nostril twice a day, Disp: 1 bottle, Rfl: 3  •  paricalcitol (ZEMPLAR) 1 MCG capsule, , Disp: , Rfl: 0  •  sulfaSALAzine (AZULFIDINE) 500 MG tablet, 2 (Two) Times a Day., Disp: , Rfl: 0  •  tamsulosin (FLOMAX) 0.4 MG capsule 24 hr capsule, Take 1 capsule by mouth 2 (Two) Times a Day., Disp: 180 capsule, Rfl: 2  •  VOLTAREN 1 % gel gel, Apply  topically to the appropriate area as directed 3 (Three) Times a Day., Disp: 100 g, Rfl: 2  •  zolpidem CR (AMBIEN CR) 12.5 MG CR tablet, Daily., Disp: , Rfl: 0        Rylan was seen today for hyperlipidemia and hypertension.    Diagnoses and all orders for this visit:    PVD (peripheral vascular disease) (CMS/HCC)    Essential hypertension    Other hyperlipidemia    Other emphysema (CMS/HCC)    Diverticulosis of large intestine without hemorrhage    Neuropathy    Enlarged prostate without lower urinary tract symptoms (luts)    Chronic kidney disease, stage III (moderate) (CMS/HCC)    Tubular adenoma    Insomnia, unspecified type    Anxiety

## 2020-02-10 RX ORDER — TAMSULOSIN HYDROCHLORIDE 0.4 MG/1
CAPSULE ORAL
Qty: 180 CAPSULE | Refills: 2 | Status: SHIPPED | OUTPATIENT
Start: 2020-02-10 | End: 2020-09-21

## 2020-02-24 RX ORDER — ACLIDINIUM BROMIDE 400 UG/1
POWDER, METERED RESPIRATORY (INHALATION)
Qty: 1 EACH | Refills: 3 | Status: SHIPPED | OUTPATIENT
Start: 2020-02-24 | End: 2020-06-22

## 2020-02-28 RX ORDER — CLONAZEPAM 0.5 MG/1
TABLET ORAL
Qty: 60 TABLET | Refills: 1 | Status: SHIPPED | OUTPATIENT
Start: 2020-02-28 | End: 2020-06-10 | Stop reason: SDUPTHER

## 2020-03-18 RX ORDER — DULOXETIN HYDROCHLORIDE 60 MG/1
CAPSULE, DELAYED RELEASE ORAL
Qty: 90 CAPSULE | Refills: 1 | Status: SHIPPED | OUTPATIENT
Start: 2020-03-18 | End: 2020-08-26

## 2020-06-10 ENCOUNTER — LAB REQUISITION (OUTPATIENT)
Dept: LAB | Facility: HOSPITAL | Age: 85
End: 2020-06-10

## 2020-06-10 ENCOUNTER — OFFICE VISIT (OUTPATIENT)
Dept: INTERNAL MEDICINE | Facility: CLINIC | Age: 85
End: 2020-06-10

## 2020-06-10 VITALS
DIASTOLIC BLOOD PRESSURE: 70 MMHG | HEART RATE: 76 BPM | OXYGEN SATURATION: 95 % | BODY MASS INDEX: 25.77 KG/M2 | SYSTOLIC BLOOD PRESSURE: 130 MMHG | WEIGHT: 174 LBS | HEIGHT: 69 IN | TEMPERATURE: 97.3 F

## 2020-06-10 DIAGNOSIS — E78.49 OTHER HYPERLIPIDEMIA: ICD-10-CM

## 2020-06-10 DIAGNOSIS — I25.10 ATHEROSCLEROSIS OF NATIVE CORONARY ARTERY OF NATIVE HEART WITHOUT ANGINA PECTORIS: ICD-10-CM

## 2020-06-10 DIAGNOSIS — I73.9 PVD (PERIPHERAL VASCULAR DISEASE) (HCC): Primary | ICD-10-CM

## 2020-06-10 DIAGNOSIS — N40.0 ENLARGED PROSTATE WITHOUT LOWER URINARY TRACT SYMPTOMS (LUTS): ICD-10-CM

## 2020-06-10 DIAGNOSIS — G47.00 INSOMNIA, UNSPECIFIED TYPE: ICD-10-CM

## 2020-06-10 DIAGNOSIS — F41.9 ANXIETY: ICD-10-CM

## 2020-06-10 DIAGNOSIS — N18.30 CHRONIC KIDNEY DISEASE, STAGE III (MODERATE) (HCC): ICD-10-CM

## 2020-06-10 DIAGNOSIS — J43.8 OTHER EMPHYSEMA (HCC): ICD-10-CM

## 2020-06-10 DIAGNOSIS — K57.30 DIVERTICULOSIS OF LARGE INTESTINE WITHOUT HEMORRHAGE: ICD-10-CM

## 2020-06-10 DIAGNOSIS — I10 ESSENTIAL HYPERTENSION: ICD-10-CM

## 2020-06-10 DIAGNOSIS — N18.30 CHRONIC KIDNEY DISEASE, STAGE 3 (MODERATE): ICD-10-CM

## 2020-06-10 DIAGNOSIS — I10 ESSENTIAL (PRIMARY) HYPERTENSION: ICD-10-CM

## 2020-06-10 DIAGNOSIS — D36.9 TUBULAR ADENOMA: ICD-10-CM

## 2020-06-10 PROCEDURE — 99214 OFFICE O/P EST MOD 30 MIN: CPT | Performed by: INTERNAL MEDICINE

## 2020-06-10 PROCEDURE — 36415 COLL VENOUS BLD VENIPUNCTURE: CPT | Performed by: INTERNAL MEDICINE

## 2020-06-10 RX ORDER — CLONAZEPAM 0.5 MG/1
TABLET ORAL
Qty: 60 TABLET | Refills: 2 | Status: SHIPPED | OUTPATIENT
Start: 2020-06-10 | End: 2020-10-21 | Stop reason: SDUPTHER

## 2020-06-10 NOTE — PROGRESS NOTES
Patient is a 84 y.o. male who is here for a follow up of hyperlipidemia and hypertension.  Chief Complaint   Patient presents with   • Hyperlipidemia   • Hypertension         HPI:    Here for mgmt of HTN and CRI and hyperlipidemia.  Onset years.  Not following diet and has gained 12 pounds.  No dizziness or lightheadedness.  BP has been good.  No HAs.  Breathing is good.  No cough.  No abdominal pains.     History:     Patient Active Problem List   Diagnosis   • Anxiety   • Aortic aneurysm (CMS/HCC)   • Atherosclerotic heart disease of native coronary artery without angina pectoris   • Chronic obstructive pulmonary disease (CMS/HCC)   • Chronic kidney disease, stage III (moderate) (CMS/HCC)   • Diverticulosis of large intestine   • Enlarged prostate without lower urinary tract symptoms (luts)   • Generalized osteoarthritis   • Hyperlipidemia   • Hypertension   • Insomnia   • Neuropathy   • PVD (peripheral vascular disease) (CMS/HCC)   • Tubular adenoma   • Absence of kidney   • Arthralgia of hip       Past Medical History:   Diagnosis Date   • Colon polyps    • Ecchymosis    • Heart attack (CMS/HCC)        Past Surgical History:   Procedure Laterality Date   • ROTATOR CUFF REPAIR Right 2008   • TONSILLECTOMY         Current Outpatient Medications on File Prior to Visit   Medication Sig   • amLODIPine (NORVASC) 2.5 MG tablet Take 1 tablet by mouth Every Night.   • aspirin 81 MG tablet Take  by mouth Daily.   • carvedilol (COREG) 12.5 MG tablet 2 (Two) Times a Day.   • CRESTOR 20 MG tablet Every Night.   • DULoxetine (CYMBALTA) 60 MG capsule TAKE 1 CAPSULE BY MOUTH EVERY DAY   • dutasteride (AVODART) 0.5 MG capsule Daily.   • fluticasone (FLONASE) 50 MCG/ACT nasal spray instill 1 spray into each nostril twice a day   • paricalcitol (ZEMPLAR) 1 MCG capsule    • sulfaSALAzine (AZULFIDINE) 500 MG tablet 2 (Two) Times a Day.   • tamsulosin (FLOMAX) 0.4 MG capsule 24 hr capsule TAKE ONE CAPSULE BY MOUTH TWICE DAILY   •  "TUDORZA PRESSAIR 400 MCG/ACT aerosol powder  powder for inhalation INHALE 1 PUFF BY MOUTH TWICE DAILY   • VOLTAREN 1 % gel gel Apply  topically to the appropriate area as directed 3 (Three) Times a Day.   • zolpidem CR (AMBIEN CR) 12.5 MG CR tablet Daily.     No current facility-administered medications on file prior to visit.        Family History   Problem Relation Age of Onset   • Cancer Other    • Heart attack Other    • Hypertension Other    • Hyperlipidemia Other        Social History     Socioeconomic History   • Marital status:      Spouse name: Not on file   • Number of children: Not on file   • Years of education: Not on file   • Highest education level: Not on file   Tobacco Use   • Smoking status: Former Smoker         Review of Systems   Constitutional: Negative for chills, diaphoresis, fever and unexpected weight change.   HENT: Negative for congestion, ear pain, hearing loss, nosebleeds, postnasal drip, sinus pressure and sore throat.    Eyes: Negative for pain, discharge and itching.   Respiratory: Positive for shortness of breath (improved). Negative for cough, chest tightness and wheezing.    Cardiovascular: Negative for chest pain, palpitations and leg swelling.   Gastrointestinal: Negative for abdominal distention, abdominal pain, blood in stool, constipation, diarrhea, nausea and vomiting.        5/15 colonoscopy with TA  7/18 \" \"   Endocrine: Negative for polydipsia and polyuria.   Genitourinary: Negative for difficulty urinating, dysuria, frequency and hematuria.        Followed by urology   Musculoskeletal: Positive for arthralgias and back pain. Negative for gait problem, joint swelling and myalgias.   Skin: Negative for rash and wound.   Neurological: Negative for dizziness, syncope and headaches.   Psychiatric/Behavioral: Positive for sleep disturbance. Negative for dysphoric mood. The patient is not nervous/anxious.        /70   Pulse 76   Temp 97.3 °F (36.3 °C) (Temporal)  " " Ht 175.3 cm (69.02\")   Wt 78.9 kg (174 lb)   SpO2 95%   BMI 25.68 kg/m²       Physical Exam   Constitutional: He is oriented to person, place, and time. He appears well-developed and well-nourished.   HENT:   Head: Normocephalic and atraumatic.   Right Ear: External ear normal.   Left Ear: External ear normal.   Mouth/Throat: Oropharynx is clear and moist.   Eyes: Conjunctivae and EOM are normal.   Neck: Normal range of motion. Neck supple.   Cardiovascular: Normal rate, regular rhythm and normal heart sounds.   Pulmonary/Chest: Effort normal and breath sounds normal.   Abdominal: Soft. Bowel sounds are normal.   Musculoskeletal: Normal range of motion.   Lymphadenopathy:     He has no cervical adenopathy.   Neurological: He is alert and oriented to person, place, and time.   Skin: Skin is warm and dry.   Psychiatric: He has a normal mood and affect. His behavior is normal. Thought content normal.       Procedure:      Discussion/Summary:    htn-controlled on CCB and BB  Cri-labs today, worsened, will send labs to [facility]  copd-controlled on Tudorza  hyperlipidemia-labs today on Crestor at goal except for TG, counseled on diet and wt loss  bph-Urology noted, controlled  diverticulosis-citrucel qd , asymptomatic  TA-colonoscopy noted, needs scope 7/23  cad/pvd-cont rf mod, asymptomatic  insomnia-cont klonopine qhs prn, advised of risk, still an issue with pandemic  djd/neuropathy-controlled with cymbalta, rf norco prn, counseled on risk of addiction      6/10 labs noted and dw patient    Advance Care Planning   ACP discussion was held with the patient during this visit. Patient has an advance directive (not in EMR), copy requested.      Current Outpatient Medications:   •  amLODIPine (NORVASC) 2.5 MG tablet, Take 1 tablet by mouth Every Night., Disp: 90 tablet, Rfl: 3  •  aspirin 81 MG tablet, Take  by mouth Daily., Disp: , Rfl:   •  carvedilol (COREG) 12.5 MG tablet, 2 (Two) Times a Day., Disp: , Rfl: 0  •  " clonazePAM (KlonoPIN) 0.5 MG tablet, TAKE 1 TO 2 TABLETS BY MOUTH EVERY NIGHT AT BEDTIME AS NEEDED, Disp: 60 tablet, Rfl: 2  •  CRESTOR 20 MG tablet, Every Night., Disp: , Rfl: 0  •  DULoxetine (CYMBALTA) 60 MG capsule, TAKE 1 CAPSULE BY MOUTH EVERY DAY, Disp: 90 capsule, Rfl: 1  •  dutasteride (AVODART) 0.5 MG capsule, Daily., Disp: , Rfl: 0  •  fluticasone (FLONASE) 50 MCG/ACT nasal spray, instill 1 spray into each nostril twice a day, Disp: 1 bottle, Rfl: 3  •  paricalcitol (ZEMPLAR) 1 MCG capsule, , Disp: , Rfl: 0  •  sulfaSALAzine (AZULFIDINE) 500 MG tablet, 2 (Two) Times a Day., Disp: , Rfl: 0  •  tamsulosin (FLOMAX) 0.4 MG capsule 24 hr capsule, TAKE ONE CAPSULE BY MOUTH TWICE DAILY, Disp: 180 capsule, Rfl: 2  •  TUDORZA PRESSAIR 400 MCG/ACT aerosol powder  powder for inhalation, INHALE 1 PUFF BY MOUTH TWICE DAILY, Disp: 1 each, Rfl: 3  •  VOLTAREN 1 % gel gel, Apply  topically to the appropriate area as directed 3 (Three) Times a Day., Disp: 100 g, Rfl: 2  •  zolpidem CR (AMBIEN CR) 12.5 MG CR tablet, Daily., Disp: , Rfl: 0        Rylan was seen today for hyperlipidemia and hypertension.    Diagnoses and all orders for this visit:    PVD (peripheral vascular disease) (CMS/HCC)    Essential hypertension  -     CBC (No Diff)    Other hyperlipidemia  -     Comprehensive Metabolic Panel  -     Lipid Panel  -     TSH    Atherosclerosis of native coronary artery of native heart without angina pectoris    Other emphysema (CMS/HCC)    Diverticulosis of large intestine without hemorrhage    Enlarged prostate without lower urinary tract symptoms (luts)    Chronic kidney disease, stage III (moderate) (CMS/HCC)  -     Vitamin D 25 Hydroxy    Tubular adenoma    Insomnia, unspecified type    Anxiety  -     clonazePAM (KlonoPIN) 0.5 MG tablet; TAKE 1 TO 2 TABLETS BY MOUTH EVERY NIGHT AT BEDTIME AS NEEDED

## 2020-06-11 LAB
25(OH)D3+25(OH)D2 SERPL-MCNC: 52.3 NG/ML (ref 30–100)
ALBUMIN SERPL-MCNC: 3.8 G/DL (ref 3.5–5.2)
ALBUMIN/GLOB SERPL: 1.3 G/DL
ALP SERPL-CCNC: 61 U/L (ref 39–117)
ALT SERPL-CCNC: 15 U/L (ref 1–41)
AST SERPL-CCNC: 12 U/L (ref 1–40)
BILIRUB SERPL-MCNC: 0.3 MG/DL (ref 0.2–1.2)
BUN SERPL-MCNC: 30 MG/DL (ref 8–23)
BUN/CREAT SERPL: 16.4 (ref 7–25)
CALCIUM SERPL-MCNC: 8.9 MG/DL (ref 8.6–10.5)
CHLORIDE SERPL-SCNC: 108 MMOL/L (ref 98–107)
CHOLEST SERPL-MCNC: 162 MG/DL (ref 0–200)
CO2 SERPL-SCNC: 25.1 MMOL/L (ref 22–29)
CREAT SERPL-MCNC: 1.83 MG/DL (ref 0.76–1.27)
ERYTHROCYTE [DISTWIDTH] IN BLOOD BY AUTOMATED COUNT: 13.4 % (ref 12.3–15.4)
GLOBULIN SER CALC-MCNC: 2.9 GM/DL
GLUCOSE SERPL-MCNC: 104 MG/DL (ref 65–99)
HCT VFR BLD AUTO: 42.4 % (ref 37.5–51)
HDLC SERPL-MCNC: 40 MG/DL (ref 40–60)
HGB BLD-MCNC: 14.1 G/DL (ref 13–17.7)
LDLC SERPL CALC-MCNC: 73 MG/DL (ref 0–100)
MCH RBC QN AUTO: 30.6 PG (ref 26.6–33)
MCHC RBC AUTO-ENTMCNC: 33.3 G/DL (ref 31.5–35.7)
MCV RBC AUTO: 92 FL (ref 79–97)
PLATELET # BLD AUTO: 203 10*3/MM3 (ref 140–450)
POTASSIUM SERPL-SCNC: 4.8 MMOL/L (ref 3.5–5.2)
PROT SERPL-MCNC: 6.7 G/DL (ref 6–8.5)
RBC # BLD AUTO: 4.61 10*6/MM3 (ref 4.14–5.8)
SODIUM SERPL-SCNC: 142 MMOL/L (ref 136–145)
TRIGL SERPL-MCNC: 246 MG/DL (ref 0–150)
TSH SERPL DL<=0.005 MIU/L-ACNC: 1.14 UIU/ML (ref 0.27–4.2)
VLDLC SERPL CALC-MCNC: 49.2 MG/DL
WBC # BLD AUTO: 7.52 10*3/MM3 (ref 3.4–10.8)

## 2020-06-22 RX ORDER — ACLIDINIUM BROMIDE 400 UG/1
POWDER, METERED RESPIRATORY (INHALATION)
Qty: 1 EACH | Refills: 3 | Status: SHIPPED | OUTPATIENT
Start: 2020-06-22 | End: 2020-10-26

## 2020-07-13 ENCOUNTER — TELEPHONE (OUTPATIENT)
Dept: INTERNAL MEDICINE | Facility: CLINIC | Age: 85
End: 2020-07-13

## 2020-07-13 DIAGNOSIS — N40.0 ENLARGED PROSTATE WITHOUT LOWER URINARY TRACT SYMPTOMS (LUTS): Primary | ICD-10-CM

## 2020-08-24 ENCOUNTER — OFFICE VISIT (OUTPATIENT)
Dept: UROLOGY | Age: 85
End: 2020-08-24

## 2020-08-24 VITALS
WEIGHT: 162 LBS | TEMPERATURE: 97.7 F | HEIGHT: 69 IN | BODY MASS INDEX: 23.99 KG/M2 | DIASTOLIC BLOOD PRESSURE: 60 MMHG | SYSTOLIC BLOOD PRESSURE: 126 MMHG | HEART RATE: 89 BPM | OXYGEN SATURATION: 99 % | RESPIRATION RATE: 17 BRPM

## 2020-08-24 DIAGNOSIS — N40.1 BPH WITH OBSTRUCTION/LOWER URINARY TRACT SYMPTOMS: Primary | ICD-10-CM

## 2020-08-24 DIAGNOSIS — N13.8 BPH WITH OBSTRUCTION/LOWER URINARY TRACT SYMPTOMS: Primary | ICD-10-CM

## 2020-08-24 LAB
BILIRUB BLD-MCNC: NEGATIVE MG/DL
CLARITY, POC: CLEAR
COLOR UR: YELLOW
GLUCOSE UR STRIP-MCNC: NEGATIVE MG/DL
KETONES UR QL: ABNORMAL
LEUKOCYTE EST, POC: ABNORMAL
NITRITE UR-MCNC: NEGATIVE MG/ML
PH UR: 6 [PH] (ref 5–8)
PROT UR STRIP-MCNC: NEGATIVE MG/DL
RBC # UR STRIP: NEGATIVE /UL
SP GR UR: 1.01 (ref 1–1.03)
UROBILINOGEN UR QL: NORMAL

## 2020-08-24 PROCEDURE — 51798 US URINE CAPACITY MEASURE: CPT | Performed by: UROLOGY

## 2020-08-24 PROCEDURE — 81003 URINALYSIS AUTO W/O SCOPE: CPT | Performed by: UROLOGY

## 2020-08-24 PROCEDURE — 99204 OFFICE O/P NEW MOD 45 MIN: CPT | Performed by: UROLOGY

## 2020-08-24 NOTE — PROGRESS NOTES
Chief Complaint  LUTS    Referring Provider  Juan Macias MD    HPI  Mr. Zimmer is a 84 y.o. male with history of HTN who presents with LUTS.  Primary symptom includes: weak stream  Patient denies dysuria.  Onset was >12 months ago.    Previous treatments include: dutasteride, tamsulosin  Formerly seen by Dr. Luna and was scheduled for TURP  He has had 3 negative prostate biopsies - last done at  [facility]    no Constipation  no Diarrhea  no History of kidney stones    IPSS Questionnaire (AUA-7):  Over the past month…    1)  Incomplete Emptying  How often have you had a sensation of not emptying your bladder?  1 - Less than 1 time in 5   2)  Frequency  How often have you had to urinate less than every two hours? 1 - Less than 1 time in 5   3)  Intermittency  How often have you found you stopped and started again several times when you urinated?  1 - Less than 1 time in 5   4) Urgency  How often have you found it difficult to postpone urination?  4 - More than half the time   5) Weak Stream  How often have you had a weak urinary stream?  4 - More than half the time   6) Straining  How often have you had to push or strain to begin urination?  1 - Less than 1 time in 5   7) Nocturia  How many times did you typically get up at night to urinate?  2 - 2 times   Total Score:  14       Quality of life due to urinary symptoms:  If you were to spend the rest of your life with your urinary condition the way it is now, how would you feel about that? 3-Mixed   Urine Leakage (Incontinence) 0-No Leakage       Past Medical History  Past Medical History:   Diagnosis Date   • Colon polyps    • Ecchymosis    • Heart attack (CMS/HCC)        Past Surgical History  Past Surgical History:   Procedure Laterality Date   • ROTATOR CUFF REPAIR Right 2008   • TONSILLECTOMY         Medications    Current Outpatient Medications:   •  amLODIPine (NORVASC) 2.5 MG tablet, Take 1 tablet by mouth Every Night., Disp: 90 tablet, Rfl: 3  •   aspirin 81 MG tablet, Take  by mouth Daily., Disp: , Rfl:   •  carvedilol (COREG) 12.5 MG tablet, 2 (Two) Times a Day., Disp: , Rfl: 0  •  clonazePAM (KlonoPIN) 0.5 MG tablet, TAKE 1 TO 2 TABLETS BY MOUTH EVERY NIGHT AT BEDTIME AS NEEDED, Disp: 60 tablet, Rfl: 2  •  CRESTOR 20 MG tablet, Every Night., Disp: , Rfl: 0  •  diclofenac (VOLTAREN) 1 % gel gel, APPLY GEL TOPICALLY TO APPROPRIATE AREA AS DIRECTED, Disp: 100 g, Rfl: 2  •  DULoxetine (CYMBALTA) 60 MG capsule, TAKE 1 CAPSULE BY MOUTH EVERY DAY, Disp: 90 capsule, Rfl: 1  •  dutasteride (AVODART) 0.5 MG capsule, Daily., Disp: , Rfl: 0  •  fluticasone (FLONASE) 50 MCG/ACT nasal spray, instill 1 spray into each nostril twice a day, Disp: 1 bottle, Rfl: 3  •  paricalcitol (ZEMPLAR) 1 MCG capsule, , Disp: , Rfl: 0  •  tamsulosin (FLOMAX) 0.4 MG capsule 24 hr capsule, TAKE ONE CAPSULE BY MOUTH TWICE DAILY, Disp: 180 capsule, Rfl: 2  •  TUDORZA PRESSAIR 400 MCG/ACT aerosol powder  powder for inhalation, INHALE 1 PUFF BY MOUTH TWICE DAILY, Disp: 1 each, Rfl: 3  •  zolpidem CR (AMBIEN CR) 12.5 MG CR tablet, Daily., Disp: , Rfl: 0  •  sulfaSALAzine (AZULFIDINE) 500 MG tablet, 2 (Two) Times a Day., Disp: , Rfl: 0    Allergies  Allergies   Allergen Reactions   • Influenza Virus Vaccine [Influenza Vaccine Live]    • Penicillins    • Quinine Derivatives        Social History  Social History     Socioeconomic History   • Marital status:      Spouse name: Not on file   • Number of children: Not on file   • Years of education: Not on file   • Highest education level: Not on file   Tobacco Use   • Smoking status: Former Smoker       Family History  He has no family history of prostate cancer    Review of Systems  Constitutional: No fevers or chills  Skin: Negative for rash  Endocrine: No heat/cold intolerance   Cardiovascular: Negative for chest pain or dyspnea on exertion  Respiratory: Negative for shortness of breath or wheezing  Gastrointestinal: No nausea or  "vomiting  Genitourinary: Negative for current gross hematuria.  Musculoskeletal: No flank pain  Neurological:  Negative for frequent headaches or dizziness  Lymph/Heme: Negative for leg swelling or calf pain.    Physical Exam  Visit Vitals  /60   Pulse 89   Temp 97.7 °F (36.5 °C)   Resp 17   Ht 175.3 cm (69\")   Wt 73.5 kg (162 lb)   SpO2 99%   BMI 23.92 kg/m²     Constitutional: NAD, WDWN.   HEENT: NCAT. Conjunctivae normal.  MMM.    Cardiovascular: Regular rate.  Pulmonary/Chest: Respirations are even and non-labored bilaterally.  Abdominal: Soft. No distension, tenderness, masses or guarding. No CVA tenderness.  Neurological: A + O x 3.  Cranial Nerves II-XII grossly intact. Normal gait.  Extremities: FELTON x 4, Warm. No clubbing.  No cyanosis.    Skin: Pink, warm and dry.  No rashes noted.  Psychiatric:  Normal mood and affect    Genitourinary  deferred    Labs  No results found for: PSA    Brief Urine Lab Results  (Last result in the past 365 days)      Color   Clarity   Blood   Leuk Est   Nitrite   Protein   CREAT   Urine HCG        08/24/20 1447 Yellow Clear Negative Moderate (2+) Negative Negative               PVR  Post-void residual performed by staff - 0      Assessment  Mr. Zimmer is a 84 y.o. male who presents with LUTS, primarily weak stream and urgency.    Plan  1.  Schedule for cystoscopy, TRUS, uroflow   2.  Stop Rx for now  3.  Get records from       Gabriel Echevarria MD    "

## 2020-08-26 DIAGNOSIS — I10 ESSENTIAL HYPERTENSION: ICD-10-CM

## 2020-08-26 RX ORDER — AMLODIPINE BESYLATE 2.5 MG/1
2.5 TABLET ORAL NIGHTLY
Qty: 90 TABLET | Refills: 3 | Status: SHIPPED | OUTPATIENT
Start: 2020-08-26 | End: 2021-08-24

## 2020-08-26 RX ORDER — DULOXETIN HYDROCHLORIDE 60 MG/1
CAPSULE, DELAYED RELEASE ORAL
Qty: 90 CAPSULE | Refills: 1 | Status: SHIPPED | OUTPATIENT
Start: 2020-08-26 | End: 2021-03-01

## 2020-09-15 ENCOUNTER — TELEPHONE (OUTPATIENT)
Dept: INTERNAL MEDICINE | Facility: CLINIC | Age: 85
End: 2020-09-15

## 2020-09-15 DIAGNOSIS — M25.559 ARTHRALGIA OF HIP, UNSPECIFIED LATERALITY: Primary | ICD-10-CM

## 2020-09-15 NOTE — TELEPHONE ENCOUNTER
Pt said that he is having some issues with his hip and he thinks its in the ball of it. Pt also said that he think it is arthritis. Pt wants a referral somewhere for this. Pt wants a call back.

## 2020-09-21 ENCOUNTER — PROCEDURE VISIT (OUTPATIENT)
Dept: UROLOGY | Facility: CLINIC | Age: 85
End: 2020-09-21

## 2020-09-21 VITALS — BODY MASS INDEX: 23.99 KG/M2 | HEIGHT: 69 IN | WEIGHT: 162 LBS

## 2020-09-21 DIAGNOSIS — R39.9 LOWER URINARY TRACT SYMPTOMS (LUTS): Primary | ICD-10-CM

## 2020-09-21 PROCEDURE — 52000 CYSTOURETHROSCOPY: CPT | Performed by: UROLOGY

## 2020-09-21 PROCEDURE — 51741 ELECTRO-UROFLOWMETRY FIRST: CPT | Performed by: UROLOGY

## 2020-09-21 PROCEDURE — 99213 OFFICE O/P EST LOW 20 MIN: CPT | Performed by: UROLOGY

## 2020-09-21 PROCEDURE — 76872 US TRANSRECTAL: CPT | Performed by: UROLOGY

## 2020-09-21 RX ORDER — SODIUM CHLORIDE 9 MG/ML
100 INJECTION, SOLUTION INTRAVENOUS CONTINUOUS
Status: CANCELLED | OUTPATIENT
Start: 2020-09-21

## 2020-09-22 ENCOUNTER — HOSPITAL ENCOUNTER (OUTPATIENT)
Dept: GENERAL RADIOLOGY | Facility: HOSPITAL | Age: 85
Discharge: HOME | End: 2020-09-22
Admitting: INTERNAL MEDICINE

## 2020-09-22 PROCEDURE — 73521 X-RAY EXAM HIPS BI 2 VIEWS: CPT

## 2020-09-24 ENCOUNTER — TELEPHONE (OUTPATIENT)
Dept: UROLOGY | Facility: CLINIC | Age: 85
End: 2020-09-24

## 2020-09-24 NOTE — TELEPHONE ENCOUNTER
Patient is scheduled 10/08/2020 @ 8 am  to see Dr Finnegan ([facility]) for surgical clearance.  Patient is scheduled to have a TURP -10/27/2020 by Dr Gabriel Echevarria.

## 2020-09-26 ENCOUNTER — RESULTS ENCOUNTER (OUTPATIENT)
Dept: UROLOGY | Facility: CLINIC | Age: 85
End: 2020-09-26

## 2020-09-26 DIAGNOSIS — R39.9 LOWER URINARY TRACT SYMPTOMS (LUTS): ICD-10-CM

## 2020-10-12 ENCOUNTER — OFFICE VISIT (OUTPATIENT)
Dept: INTERNAL MEDICINE | Facility: CLINIC | Age: 85
End: 2020-10-12

## 2020-10-12 ENCOUNTER — LAB REQUISITION (OUTPATIENT)
Dept: LAB | Facility: HOSPITAL | Age: 85
End: 2020-10-12

## 2020-10-12 VITALS
HEART RATE: 64 BPM | BODY MASS INDEX: 24.73 KG/M2 | SYSTOLIC BLOOD PRESSURE: 130 MMHG | TEMPERATURE: 97.1 F | DIASTOLIC BLOOD PRESSURE: 68 MMHG | WEIGHT: 167 LBS | HEIGHT: 69 IN

## 2020-10-12 DIAGNOSIS — G62.9 NEUROPATHY: ICD-10-CM

## 2020-10-12 DIAGNOSIS — I10 ESSENTIAL HYPERTENSION: Primary | ICD-10-CM

## 2020-10-12 DIAGNOSIS — D36.9 TUBULAR ADENOMA: ICD-10-CM

## 2020-10-12 DIAGNOSIS — I25.10 ATHEROSCLEROSIS OF NATIVE CORONARY ARTERY OF NATIVE HEART WITHOUT ANGINA PECTORIS: ICD-10-CM

## 2020-10-12 DIAGNOSIS — N40.0 ENLARGED PROSTATE WITHOUT LOWER URINARY TRACT SYMPTOMS (LUTS): ICD-10-CM

## 2020-10-12 DIAGNOSIS — I73.9 PVD (PERIPHERAL VASCULAR DISEASE) (HCC): ICD-10-CM

## 2020-10-12 DIAGNOSIS — M25.559 ARTHRALGIA OF HIP, UNSPECIFIED LATERALITY: ICD-10-CM

## 2020-10-12 DIAGNOSIS — K57.30 DIVERTICULOSIS OF LARGE INTESTINE WITHOUT HEMORRHAGE: ICD-10-CM

## 2020-10-12 DIAGNOSIS — J43.8 OTHER EMPHYSEMA (HCC): ICD-10-CM

## 2020-10-12 DIAGNOSIS — E78.49 OTHER HYPERLIPIDEMIA: ICD-10-CM

## 2020-10-12 DIAGNOSIS — Z00.00 ROUTINE GENERAL MEDICAL EXAMINATION AT A HEALTH CARE FACILITY: ICD-10-CM

## 2020-10-12 DIAGNOSIS — I10 ESSENTIAL (PRIMARY) HYPERTENSION: ICD-10-CM

## 2020-10-12 DIAGNOSIS — G47.00 INSOMNIA, UNSPECIFIED TYPE: ICD-10-CM

## 2020-10-12 DIAGNOSIS — N18.30 STAGE 3 CHRONIC KIDNEY DISEASE, UNSPECIFIED WHETHER STAGE 3A OR 3B CKD (HCC): ICD-10-CM

## 2020-10-12 LAB
BILIRUB BLD-MCNC: NEGATIVE MG/DL
CLARITY, POC: CLEAR
COLOR UR: YELLOW
GLUCOSE UR STRIP-MCNC: NEGATIVE MG/DL
KETONES UR QL: ABNORMAL
LEUKOCYTE EST, POC: NEGATIVE
NITRITE UR-MCNC: NEGATIVE MG/ML
PH UR: 5 [PH] (ref 5–8)
PROT UR STRIP-MCNC: NEGATIVE MG/DL
RBC # UR STRIP: NEGATIVE /UL
SP GR UR: 1.01 (ref 1–1.03)
UROBILINOGEN UR QL: NORMAL

## 2020-10-12 PROCEDURE — 81003 URINALYSIS AUTO W/O SCOPE: CPT | Performed by: INTERNAL MEDICINE

## 2020-10-12 PROCEDURE — 36415 COLL VENOUS BLD VENIPUNCTURE: CPT | Performed by: INTERNAL MEDICINE

## 2020-10-12 PROCEDURE — 96160 PT-FOCUSED HLTH RISK ASSMT: CPT | Performed by: INTERNAL MEDICINE

## 2020-10-12 PROCEDURE — G0439 PPPS, SUBSEQ VISIT: HCPCS | Performed by: INTERNAL MEDICINE

## 2020-10-12 NOTE — PROGRESS NOTES
The ABCs of the Annual Wellness Visit  Subsequent Medicare Wellness Visit    Chief Complaint   Patient presents with   • Annual Exam       Subjective   History of Present Illness:  Rylan Zimmer is a 84 y.o. male who presents for a Subsequent Medicare Wellness Visit.    HEALTH RISK ASSESSMENT    Recent Hospitalizations:  No hospitalization(s) within the last year.    Current Medical Providers:  Patient Care Team:  Juan Macias MD as PCP - General  Juan Macias MD as PCP - Family Medicine    Smoking Status:  Social History     Tobacco Use   Smoking Status Former Smoker       Alcohol Consumption:  Social History     Substance and Sexual Activity   Alcohol Use None       Depression Screen:   PHQ-2/PHQ-9 Depression Screening 10/12/2020   Little interest or pleasure in doing things 1   Feeling down, depressed, or hopeless 0   Trouble falling or staying asleep, or sleeping too much 3   Feeling tired or having little energy 2   Poor appetite or overeating 0   Feeling bad about yourself - or that you are a failure or have let yourself or your family down 0   Trouble concentrating on things, such as reading the newspaper or watching television 0   Moving or speaking so slowly that other people could have noticed. Or the opposite - being so fidgety or restless that you have been moving around a lot more than usual 0   Thoughts that you would be better off dead, or of hurting yourself in some way 0   Total Score 6   If you checked off any problems, how difficult have these problems made it for you to do your work, take care of things at home, or get along with other people? Not difficult at all       Fall Risk Screen:  STEADI Fall Risk Assessment was completed, and patient is at LOW risk for falls.Assessment completed on:10/12/2020    Health Habits and Functional and Cognitive Screening:  Functional & Cognitive Status 10/12/2020   Do you have difficulty preparing food and eating? No   Do you have difficulty  bathing yourself, getting dressed or grooming yourself? No   Do you have difficulty using the toilet? No   Do you have difficulty moving around from place to place? No   Do you have trouble with steps or getting out of a bed or a chair? No   Current Diet Well Balanced Diet   Dental Exam Not up to date   Eye Exam Not up to date   Exercise (times per week) 1 times per week   Current Exercises Include Walking   Do you need help using the phone?  No   Are you deaf or do you have serious difficulty hearing?  No   Do you need help with transportation? No   Do you need help shopping? No   Do you need help preparing meals?  No   Do you need help with housework?  No   Do you need help with laundry? No   Do you need help taking your medications? No   Do you need help managing money? No   Do you ever drive or ride in a car without wearing a seat belt? Yes   Have you felt unusual stress, anger or loneliness in the last month? No   Who do you live with? Spouse   If you need help, do you have trouble finding someone available to you? No   Have you been bothered in the last four weeks by sexual problems? No   Do you have difficulty concentrating, remembering or making decisions? No         Does the patient have evidence of cognitive impairment? No    Asprin use counseling:Taking ASA appropriately as indicated    Age-appropriate Screening Schedule:  Refer to the list below for future screening recommendations based on patient's age, sex and/or medical conditions. Orders for these recommended tests are listed in the plan section. The patient has been provided with a written plan.    Health Maintenance   Topic Date Due   • TDAP/TD VACCINES (1 - Tdap) 11/24/1954   • ZOSTER VACCINE (1 of 2) 11/24/1986   • INFLUENZA VACCINE  08/01/2020   • LIPID PANEL  10/12/2021            Fall Risk Assessment was completed, and patient is at low risk for falls.      The following portions of the patient's history were reviewed and updated as  appropriate: current medications, past family history, past medical history, past social history, past surgical history and problem list.    Outpatient Medications Prior to Visit   Medication Sig Dispense Refill   • amLODIPine (NORVASC) 2.5 MG tablet TAKE 1 TABLET BY MOUTH EVERY NIGHT 90 tablet 3   • aspirin 81 MG tablet Take  by mouth Daily.     • carvedilol (COREG) 12.5 MG tablet 2 (Two) Times a Day.  0   • clonazePAM (KlonoPIN) 0.5 MG tablet TAKE 1 TO 2 TABLETS BY MOUTH EVERY NIGHT AT BEDTIME AS NEEDED 60 tablet 2   • CRESTOR 20 MG tablet Every Night.  0   • diclofenac (VOLTAREN) 1 % gel gel APPLY GEL TOPICALLY TO APPROPRIATE AREA AS DIRECTED 100 g 2   • DULoxetine (CYMBALTA) 60 MG capsule TAKE 1 CAPSULE BY MOUTH EVERY DAY 90 capsule 1   • fluticasone (FLONASE) 50 MCG/ACT nasal spray instill 1 spray into each nostril twice a day 1 bottle 3   • paricalcitol (ZEMPLAR) 1 MCG capsule   0   • sulfaSALAzine (AZULFIDINE) 500 MG tablet 2 (Two) Times a Day.  0   • TUDORZA PRESSAIR 400 MCG/ACT aerosol powder  powder for inhalation INHALE 1 PUFF BY MOUTH TWICE DAILY 1 each 3   • zolpidem CR (AMBIEN CR) 12.5 MG CR tablet Daily.  0     No facility-administered medications prior to visit.        Patient Active Problem List   Diagnosis   • Anxiety   • Aortic aneurysm (CMS/HCC)   • Atherosclerotic heart disease of native coronary artery without angina pectoris   • Chronic obstructive pulmonary disease (CMS/[facility])   • Chronic kidney disease, stage III (moderate)   • Diverticulosis of large intestine   • Enlarged prostate without lower urinary tract symptoms (luts)   • Generalized osteoarthritis   • Hyperlipidemia   • Hypertension   • Insomnia   • Neuropathy   • PVD (peripheral vascular disease) (CMS/HCC)   • Tubular adenoma   • Absence of kidney   • Arthralgia of hip   • Lower urinary tract symptoms (LUTS)   • Routine general medical examination at a health care facility       Advanced Care Planning:  ACP discussion was held with  "the patient during this visit. Patient has an advance directive (not in EMR), copy requested.    Review of Systems   Constitutional: Negative for chills, diaphoresis, fever and unexpected weight change.   HENT: Negative for congestion, ear pain, hearing loss, nosebleeds, postnasal drip, sinus pressure and sore throat.    Eyes: Negative for pain, discharge and itching.   Respiratory: Positive for shortness of breath (improved). Negative for cough, chest tightness and wheezing.    Cardiovascular: Negative for chest pain, palpitations and leg swelling.   Gastrointestinal: Negative for abdominal distention, abdominal pain, blood in stool, constipation, diarrhea, nausea and vomiting.        5/15 colonoscopy with TA  7/18 \" \"   Endocrine: Negative for polydipsia and polyuria.   Genitourinary: Negative for difficulty urinating, dysuria, frequency and hematuria.        Followed by urology, plans for TURP   Musculoskeletal: Positive for arthralgias (in hips) and back pain. Negative for gait problem, joint swelling and myalgias.   Skin: Negative for rash and wound.   Neurological: Negative for dizziness, syncope and headaches.   Psychiatric/Behavioral: Positive for sleep disturbance. Negative for dysphoric mood. The patient is not nervous/anxious.        Compared to one year ago, the patient feels his physical health is the same.  Compared to one year ago, the patient feels his mental health is the same.    Reviewed chart for potential of high risk medication in the elderly: yes  Reviewed chart for potential of harmful drug interactions in the elderly:yes    Objective         Vitals:    10/12/20 0926   BP: 130/68   BP Location: Left arm   Patient Position: Sitting   Pulse: 64   Temp: 97.1 °F (36.2 °C)   TempSrc: Infrared   Weight: 75.8 kg (167 lb)   Height: 175.3 cm (69.02\")   PainSc:   2       Body mass index is 24.65 kg/m².  Discussed the patient's BMI with him. The BMI is above average; BMI management plan is " completed.    Physical Exam  Constitutional:       Appearance: Normal appearance. He is well-developed.   HENT:      Head: Normocephalic and atraumatic.      Right Ear: External ear normal.      Left Ear: External ear normal.      Nose: Nose normal.      Mouth/Throat:      Mouth: Mucous membranes are moist.      Pharynx: Oropharynx is clear.   Eyes:      Extraocular Movements: Extraocular movements intact.      Conjunctiva/sclera: Conjunctivae normal.      Pupils: Pupils are equal, round, and reactive to light.   Neck:      Musculoskeletal: Normal range of motion and neck supple.   Cardiovascular:      Rate and Rhythm: Normal rate and regular rhythm.      Heart sounds: Normal heart sounds.   Pulmonary:      Effort: Pulmonary effort is normal.      Breath sounds: Normal breath sounds.   Abdominal:      General: Bowel sounds are normal.      Palpations: Abdomen is soft.   Musculoskeletal: Normal range of motion.   Lymphadenopathy:      Cervical: No cervical adenopathy.   Skin:     General: Skin is warm and dry.   Neurological:      General: No focal deficit present.      Mental Status: He is alert and oriented to person, place, and time.   Psychiatric:         Mood and Affect: Mood normal.         Behavior: Behavior normal.         Thought Content: Thought content normal.         Lab Results   Component Value Date    GLU 91 10/12/2020    CHLPL 155 10/12/2020    TRIG 145 10/12/2020    HDL 41 10/12/2020    LDL 88 10/12/2020    VLDL 26 10/12/2020        Assessment/Plan   Medicare Risks and Personalized Health Plan  CMS Preventative Services Quick Reference  Cardiovascular risk  Fall Risk  Immunizations Discussed/Encouraged (specific immunizations; Td, Hepatitis A Vaccine/Series, Influenza, Pneumococcal 23, Prevnar and Shingrix )  Inactivity/Sedentary  Obesity/Overweight   Polypharmacy    The above risks/problems have been discussed with the patient.  Pertinent information has been shared with the patient in the After  Visit Summary.  Follow up plans and orders are seen below in the Assessment/Plan Section.    Diagnoses and all orders for this visit:    1. Essential hypertension (Primary)  -     CBC (No Diff)    2. Other hyperlipidemia  -     Comprehensive Metabolic Panel  -     Lipid Panel  -     ezetimibe (Zetia) 10 MG tablet; Take 1 tablet by mouth Daily. Continue crestor  Dispense: 90 tablet; Refill: 3    3. Other emphysema (CMS/HCC)    4. Diverticulosis of large intestine without hemorrhage    5. Arthralgia of hip, unspecified laterality    6. Neuropathy    7. Enlarged prostate without lower urinary tract symptoms (luts)    8. Stage 3 chronic kidney disease, unspecified whether stage 3a or 3b CKD  -     POC Urinalysis Dipstick, Automated    9. Tubular adenoma    10. Insomnia, unspecified type    11. PVD (peripheral vascular disease) (CMS/HCC)    12. Atherosclerosis of native coronary artery of native heart without angina pectoris    13. Routine general medical examination at a health care facility      Follow Up:  Return in about 4 months (around 2/12/2021) for Recheck.     An After Visit Summary and PPPS were given to the patient.      HME-counseled on diet and exercise, has already had the flu shot  htn-stable on CCB and BB  Cri-labs today stable, will send labs to NA  copd-controlled on Tudorza  hyperlipidemia-labs today on Crestor, counseled on diet and wt loss, will add zetia to get the LDL below 70  bph-Urology noted, for Turp  diverticulosis-citrucel qd , asymptomatic  TA-colonoscopy noted, needs scope 7/23  cad/pvd-cont rf mod, asymptomatic  insomnia-cont klonopine qhs prn, advised of risk  djd/neuropathy-cont cymbalta and gabapentin, f/u Dr Garduno      10/12 labs noted and dw patient    Reviewed the following with the patient: advised patient to avoid alcoholic beverages, encouraged patient to exercise 5-7 days per week for 30 minutes at a time, ideal body weight discussed with patient and weight loss  encouraged.

## 2020-10-13 LAB
ALBUMIN SERPL-MCNC: 4.3 G/DL (ref 3.5–5.2)
ALBUMIN/GLOB SERPL: 2 G/DL
ALP SERPL-CCNC: 77 U/L (ref 39–117)
ALT SERPL-CCNC: 13 U/L (ref 1–41)
AST SERPL-CCNC: 14 U/L (ref 1–40)
BILIRUB SERPL-MCNC: 0.3 MG/DL (ref 0–1.2)
BUN SERPL-MCNC: 30 MG/DL (ref 8–23)
BUN/CREAT SERPL: 18.8 (ref 7–25)
CALCIUM SERPL-MCNC: 8.9 MG/DL (ref 8.6–10.5)
CHLORIDE SERPL-SCNC: 106 MMOL/L (ref 98–107)
CHOLEST SERPL-MCNC: 155 MG/DL (ref 0–200)
CO2 SERPL-SCNC: 26.9 MMOL/L (ref 22–29)
CREAT SERPL-MCNC: 1.6 MG/DL (ref 0.76–1.27)
ERYTHROCYTE [DISTWIDTH] IN BLOOD BY AUTOMATED COUNT: 13.2 % (ref 12.3–15.4)
GLOBULIN SER CALC-MCNC: 2.1 GM/DL
GLUCOSE SERPL-MCNC: 91 MG/DL (ref 65–99)
HCT VFR BLD AUTO: 41.7 % (ref 37.5–51)
HDLC SERPL-MCNC: 41 MG/DL (ref 40–60)
HGB BLD-MCNC: 13.4 G/DL (ref 13–17.7)
LDLC SERPL CALC-MCNC: 88 MG/DL (ref 0–100)
MCH RBC QN AUTO: 29.3 PG (ref 26.6–33)
MCHC RBC AUTO-ENTMCNC: 32.1 G/DL (ref 31.5–35.7)
MCV RBC AUTO: 91 FL (ref 79–97)
PLATELET # BLD AUTO: 258 10*3/MM3 (ref 140–450)
POTASSIUM SERPL-SCNC: 5.1 MMOL/L (ref 3.5–5.2)
PROT SERPL-MCNC: 6.4 G/DL (ref 6–8.5)
RBC # BLD AUTO: 4.58 10*6/MM3 (ref 4.14–5.8)
SODIUM SERPL-SCNC: 140 MMOL/L (ref 136–145)
TRIGL SERPL-MCNC: 145 MG/DL (ref 0–150)
VLDLC SERPL CALC-MCNC: 26 MG/DL (ref 5–40)
WBC # BLD AUTO: 9.66 10*3/MM3 (ref 3.4–10.8)

## 2020-10-13 RX ORDER — EZETIMIBE 10 MG/1
10 TABLET ORAL DAILY
Qty: 90 TABLET | Refills: 3 | Status: SHIPPED | OUTPATIENT
Start: 2020-10-13 | End: 2021-10-18

## 2020-10-14 ENCOUNTER — OFFICE VISIT (OUTPATIENT)
Dept: ORTHOPEDIC SURGERY | Facility: CLINIC | Age: 85
End: 2020-10-14

## 2020-10-14 VITALS — BODY MASS INDEX: 24.75 KG/M2 | OXYGEN SATURATION: 96 % | WEIGHT: 167.11 LBS | HEART RATE: 78 BPM | HEIGHT: 69 IN

## 2020-10-14 DIAGNOSIS — M15.9 GENERALIZED OSTEOARTHRITIS: Primary | ICD-10-CM

## 2020-10-14 DIAGNOSIS — M70.62 TROCHANTERIC BURSITIS OF BOTH HIPS: Primary | ICD-10-CM

## 2020-10-14 DIAGNOSIS — M70.61 TROCHANTERIC BURSITIS OF BOTH HIPS: Primary | ICD-10-CM

## 2020-10-14 PROCEDURE — 99203 OFFICE O/P NEW LOW 30 MIN: CPT | Performed by: ORTHOPAEDIC SURGERY

## 2020-10-14 RX ORDER — PREDNISONE 10 MG/1
TABLET ORAL SEE ADMIN INSTRUCTIONS
Qty: 1 EACH | Refills: 0 | Status: SHIPPED | OUTPATIENT
Start: 2020-10-14 | End: 2020-11-24

## 2020-10-14 NOTE — PROGRESS NOTES
[facility] Note    Subjective     Chief Complaint   Patient presents with   • Right Hip - Pain   • Left Hip - Pain        HPI    Rylan Zimmer is a 84 y.o. male who presents with bilateral hip pain.  Onset: atraumatic and gradual in nature. The issue has been ongoing for 1 month(s). Pain is a 2/10 on the pain scale. Pain is described as dull. Associated symptoms include pain and stiffness. The pain is worse with walking; nothing improves the pain. Previous treatments have included: nothing.  Pain is located on the lateral aspect of the hips.  No history of trauma.  Cannot take anti-inflammatories.    I have reviewed the following portions of the patient's history and agree with: History of Present Illness and Review of Systems    Patient Active Problem List   Diagnosis   • Anxiety   • Aortic aneurysm (CMS/HCC)   • Atherosclerotic heart disease of native coronary artery without angina pectoris   • Chronic obstructive pulmonary disease (CMS/HCC)   • Chronic kidney disease, stage III (moderate)   • Diverticulosis of large intestine   • Enlarged prostate without lower urinary tract symptoms (luts)   • Generalized osteoarthritis   • Hyperlipidemia   • Hypertension   • Insomnia   • Neuropathy   • PVD (peripheral vascular disease) (CMS/HCC)   • Tubular adenoma   • Absence of kidney   • Arthralgia of hip   • Lower urinary tract symptoms (LUTS)   • Routine general medical examination at a health care facility     Past Medical History:   Diagnosis Date   • Colon polyps    • Ecchymosis    • Heart attack (CMS/HCC)       Past Surgical History:   Procedure Laterality Date   • ROTATOR CUFF REPAIR Right 2008   • TONSILLECTOMY        Family History   Problem Relation Age of Onset   • Cancer Other    • Heart attack Other    • Hypertension Other    • Hyperlipidemia Other      Social History     Socioeconomic History   • Marital status:      Spouse name: Not on file   • Number of children: Not on  file   • Years of education: Not on file   • Highest education level: Not on file   Tobacco Use   • Smoking status: Former Smoker      Current Outpatient Medications on File Prior to Visit   Medication Sig Dispense Refill   • amLODIPine (NORVASC) 2.5 MG tablet TAKE 1 TABLET BY MOUTH EVERY NIGHT 90 tablet 3   • aspirin 81 MG tablet Take  by mouth Daily.     • carvedilol (COREG) 12.5 MG tablet 2 (Two) Times a Day.  0   • clonazePAM (KlonoPIN) 0.5 MG tablet TAKE 1 TO 2 TABLETS BY MOUTH EVERY NIGHT AT BEDTIME AS NEEDED 60 tablet 2   • CRESTOR 20 MG tablet Every Night.  0   • diclofenac (VOLTAREN) 1 % gel gel APPLY GEL TOPICALLY TO APPROPRIATE AREA AS DIRECTED 100 g 2   • DULoxetine (CYMBALTA) 60 MG capsule TAKE 1 CAPSULE BY MOUTH EVERY DAY 90 capsule 1   • ezetimibe (Zetia) 10 MG tablet Take 1 tablet by mouth Daily. Continue crestor 90 tablet 3   • fluticasone (FLONASE) 50 MCG/ACT nasal spray instill 1 spray into each nostril twice a day 1 bottle 3   • paricalcitol (ZEMPLAR) 1 MCG capsule   0   • sulfaSALAzine (AZULFIDINE) 500 MG tablet 2 (Two) Times a Day.  0   • TUDORZA PRESSAIR 400 MCG/ACT aerosol powder  powder for inhalation INHALE 1 PUFF BY MOUTH TWICE DAILY 1 each 3   • zolpidem CR (AMBIEN CR) 12.5 MG CR tablet Daily.  0     No current facility-administered medications on file prior to visit.       Allergies   Allergen Reactions   • Influenza Virus Vaccine [Influenza Vaccine Live]    • Penicillins    • Quinine Derivatives         Review of Systems   Constitutional: Negative for activity change, appetite change, chills, diaphoresis, fatigue, fever and unexpected weight change.   HENT: Negative for congestion, dental problem, drooling, ear discharge, ear pain, facial swelling, hearing loss, mouth sores, nosebleeds, postnasal drip, rhinorrhea, sinus pressure, sneezing, sore throat, tinnitus, trouble swallowing and voice change.    Eyes: Negative for photophobia, pain, discharge, redness, itching and visual  "disturbance.   Respiratory: Negative for apnea, cough, choking, chest tightness, shortness of breath, wheezing and stridor.    Cardiovascular: Negative for chest pain, palpitations and leg swelling.   Gastrointestinal: Negative for abdominal distention, abdominal pain, anal bleeding, blood in stool, constipation, diarrhea, nausea, rectal pain and vomiting.   Endocrine: Negative for cold intolerance, heat intolerance, polydipsia, polyphagia and polyuria.   Genitourinary: Negative for decreased urine volume, difficulty urinating, dysuria, enuresis, flank pain, frequency, genital sores, hematuria and urgency.   Musculoskeletal: Positive for arthralgias. Negative for back pain, gait problem, joint swelling, myalgias, neck pain and neck stiffness.   Skin: Negative for color change, pallor, rash and wound.   Allergic/Immunologic: Negative for environmental allergies, food allergies and immunocompromised state.   Neurological: Negative for dizziness, tremors, seizures, syncope, facial asymmetry, speech difficulty, weakness, light-headedness, numbness and headaches.   Hematological: Negative for adenopathy. Does not bruise/bleed easily.   Psychiatric/Behavioral: Negative for agitation, behavioral problems, confusion, decreased concentration, dysphoric mood, hallucinations, self-injury, sleep disturbance and suicidal ideas. The patient is not nervous/anxious and is not hyperactive.         Objective      Physical Exam  Pulse 78   Ht 175.3 cm (69.02\")   Wt 75.8 kg (167 lb 1.7 oz)   SpO2 96%   BMI 24.67 kg/m²     Body mass index is 24.67 kg/m².    General:   Mental Status:  Alert   Appearance: Cooperative, in no acute distress   Build and Nutrition: Well-nourished well-developed male   Orientation: Alert and oriented to person, place and time   Posture: Normal   Gait: Slow    Integument:   Right hip: No skin lesions, no rash, no ecchymosis   Left hip: No skin lesions, no rash, no ecchymosis    Neurologic:   Motor:  Right " lower extremity: 5/5 quadriceps, hamstrings, ankle dorsiflexors, and ankle plantar flexors  Left lower extremity: 5/5 quadriceps, hamstrings, ankle dorsiflexors, and ankle plantar flexors    Lower Extremities:   Right Hip:    Tenderness:  None    Swelling:  None    Crepitus: None    Atrophy:  None    Range of motion:  External Rotation: 30°       Internal Rotation: 30°       Flexion:  100°       Extension:  0°   Instability:  None  Deformities:  None  Functional testing: Negative Stinchfield  No leg length discrepancy   Left Hip:    Tenderness:  None    Swelling:  None    Crepitus:  None    Atrophy:  None    Range of motion:  External Rotation: 30°       Internal Rotation: 30°       Flexion:  100°       Extension:  0°   Instability:  None  Deformities:  None  Functional testing: Negative Stinchfield    No leg length discrepancy      Imaging/Studies    EXAMINATION: XR HIPS BILATERAL W OR WO PELVIS 2 VIEW-      INDICATION: hip pain; M25.559-Pain in unspecified hip      COMPARISON: NONE     FINDINGS: Degenerative changes of the imaged lower lumbar spine are  noted. Minimal bilateral hip degenerative changes with some minimal  narrowing and sclerosis. No evidence of acute fracture or dislocation.  Mild diffuse demineralization is noted. Groundglass finding  intertrochanteric region left femur favored to represent a benign  enchondroma.     IMPRESSION:  Degenerative changes of the imaged lower lumbar spine are  noted. Minimal bilateral hip degenerative changes with some minimal  narrowing and sclerosis. No evidence of acute fracture or dislocation.  Mild diffuse demineralization is noted. Groundglass finding  intertrochanteric region left femur favored to represent a benign  enchondroma.     This report was finalized on 9/22/2020 5:00 PM by Phan Foss.    Assessment and Plan     Diagnoses and all orders for this visit:    1. Trochanteric bursitis of both hips (Primary)        1. Trochanteric bursitis of both hips         Reviewed my findings with patient today.  He has multiple joint complaints, but we are specifically looking at his hips today.  He has mild radiographic signs of arthritis.  Most of his pain is laterally, with no groin pain.  I suspect that he has trochanteric bursitis, and I discussed referral to a physical therapist.  He states that physical therapy has not helped him with other problems in the past.  He is not interested in physical therapy.  Clinically I cannot isolate the pain to palpation, therefore I do not believe trochanteric injections would be particularly helpful.  Perhaps he would benefit from an oral steroid.  In light of all of his multiple joint complaints, he may need to see a rheumatologist.  I recommended that he discuss the possibility of an oral steroid with Dr Macias, as well as possible rheumatology referral.    Return if symptoms worsen or fail to improve.    Medical Decision Making  Data/Risk: independent visualization of imaging, lab tests, or EMG/NCV      Glen Garduno MD  10/14/20  15:03 OLINDAT    Dragon disclaimer:  Much of this encounter note is an electronic transcription/translation of spoken language to printed text. The electronic translation of spoken language may permit erroneous, or at times, nonsensical words or phrases to be inadvertently transcribed; Although I have reviewed the note for such errors, some may still exist.

## 2020-10-21 DIAGNOSIS — F41.9 ANXIETY: ICD-10-CM

## 2020-10-21 RX ORDER — CLONAZEPAM 0.5 MG/1
TABLET ORAL
Qty: 60 TABLET | Refills: 2 | Status: SHIPPED | OUTPATIENT
Start: 2020-10-21 | End: 2021-04-08

## 2020-10-23 ENCOUNTER — ANESTHESIA EVENT (OUTPATIENT)
Dept: PERIOP | Facility: HOSPITAL | Age: 85
End: 2020-10-23

## 2020-10-23 ENCOUNTER — APPOINTMENT (OUTPATIENT)
Dept: PREADMISSION TESTING | Facility: HOSPITAL | Age: 85
End: 2020-10-23

## 2020-10-23 VITALS — BODY MASS INDEX: 24.41 KG/M2 | HEIGHT: 69 IN | WEIGHT: 164.8 LBS

## 2020-10-23 DIAGNOSIS — R39.9 LOWER URINARY TRACT SYMPTOMS (LUTS): ICD-10-CM

## 2020-10-23 DIAGNOSIS — Z01.818 PREOP TESTING: Primary | ICD-10-CM

## 2020-10-23 LAB
BASOPHILS # BLD AUTO: 0.05 10*3/MM3 (ref 0–0.2)
BASOPHILS NFR BLD AUTO: 0.3 % (ref 0–1.5)
DEPRECATED RDW RBC AUTO: 49.8 FL (ref 37–54)
EOSINOPHIL # BLD AUTO: 0.01 10*3/MM3 (ref 0–0.4)
EOSINOPHIL NFR BLD AUTO: 0.1 % (ref 0.3–6.2)
ERYTHROCYTE [DISTWIDTH] IN BLOOD BY AUTOMATED COUNT: 14.4 % (ref 12.3–15.4)
HCT VFR BLD AUTO: 42.1 % (ref 37.5–51)
HGB BLD-MCNC: 13.4 G/DL (ref 13–17.7)
IMM GRANULOCYTES # BLD AUTO: 0.57 10*3/MM3 (ref 0–0.05)
IMM GRANULOCYTES NFR BLD AUTO: 3.9 % (ref 0–0.5)
LYMPHOCYTES # BLD AUTO: 0.83 10*3/MM3 (ref 0.7–3.1)
LYMPHOCYTES NFR BLD AUTO: 5.7 % (ref 19.6–45.3)
MCH RBC QN AUTO: 30.1 PG (ref 26.6–33)
MCHC RBC AUTO-ENTMCNC: 31.8 G/DL (ref 31.5–35.7)
MCV RBC AUTO: 94.6 FL (ref 79–97)
MONOCYTES # BLD AUTO: 0.99 10*3/MM3 (ref 0.1–0.9)
MONOCYTES NFR BLD AUTO: 6.8 % (ref 5–12)
NEUTROPHILS NFR BLD AUTO: 12.19 10*3/MM3 (ref 1.7–7)
NEUTROPHILS NFR BLD AUTO: 83.2 % (ref 42.7–76)
NRBC BLD AUTO-RTO: 0 /100 WBC (ref 0–0.2)
PLATELET # BLD AUTO: 232 10*3/MM3 (ref 140–450)
PMV BLD AUTO: 9.5 FL (ref 6–12)
RBC # BLD AUTO: 4.45 10*6/MM3 (ref 4.14–5.8)
WBC # BLD AUTO: 14.64 10*3/MM3 (ref 3.4–10.8)

## 2020-10-23 PROCEDURE — 36415 COLL VENOUS BLD VENIPUNCTURE: CPT | Performed by: UROLOGY

## 2020-10-23 PROCEDURE — U0004 COV-19 TEST NON-CDC HGH THRU: HCPCS

## 2020-10-23 PROCEDURE — 85025 COMPLETE CBC W/AUTO DIFF WBC: CPT | Performed by: UROLOGY

## 2020-10-24 LAB — SARS-COV-2 RNA RESP QL NAA+PROBE: NOT DETECTED

## 2020-10-26 RX ORDER — ACLIDINIUM BROMIDE 400 UG/1
POWDER, METERED RESPIRATORY (INHALATION)
Qty: 1 EACH | Refills: 11 | Status: SHIPPED | OUTPATIENT
Start: 2020-10-26 | End: 2020-11-13 | Stop reason: SDUPTHER

## 2020-10-27 ENCOUNTER — ANESTHESIA (OUTPATIENT)
Dept: PERIOP | Facility: HOSPITAL | Age: 85
End: 2020-10-27

## 2020-10-27 ENCOUNTER — HOSPITAL ENCOUNTER (OUTPATIENT)
Facility: HOSPITAL | Age: 85
Setting detail: HOSPITAL OUTPATIENT SURGERY
Discharge: HOME | End: 2020-10-27
Attending: UROLOGY | Admitting: UROLOGY

## 2020-10-27 VITALS
HEART RATE: 64 BPM | OXYGEN SATURATION: 100 % | SYSTOLIC BLOOD PRESSURE: 145 MMHG | TEMPERATURE: 97.9 F | RESPIRATION RATE: 16 BRPM | DIASTOLIC BLOOD PRESSURE: 60 MMHG

## 2020-10-27 DIAGNOSIS — R39.9 LOWER URINARY TRACT SYMPTOMS (LUTS): ICD-10-CM

## 2020-10-27 LAB
BILIRUB UR QL STRIP: NEGATIVE
CLARITY UR: CLEAR
COLOR UR: YELLOW
GLUCOSE UR STRIP-MCNC: NEGATIVE MG/DL
HGB UR QL STRIP.AUTO: NEGATIVE
KETONES UR QL STRIP: NEGATIVE
LEUKOCYTE ESTERASE UR QL STRIP.AUTO: NEGATIVE
NITRITE UR QL STRIP: NEGATIVE
PH UR STRIP.AUTO: <=5 [PH] (ref 5–8)
PROT UR QL STRIP: ABNORMAL
SP GR UR STRIP: 1.02 (ref 1–1.03)
UROBILINOGEN UR QL STRIP: ABNORMAL

## 2020-10-27 PROCEDURE — 94799 UNLISTED PULMONARY SVC/PX: CPT

## 2020-10-27 PROCEDURE — 52648 LASER SURGERY OF PROSTATE: CPT | Performed by: UROLOGY

## 2020-10-27 PROCEDURE — 25010000002 PROPOFOL 200 MG/20ML EMULSION: Performed by: NURSE ANESTHETIST, CERTIFIED REGISTERED

## 2020-10-27 PROCEDURE — 25010000002 LEVOFLOXACIN PER 250 MG: Performed by: UROLOGY

## 2020-10-27 PROCEDURE — 25010000002 DEXAMETHASONE PER 1 MG: Performed by: NURSE ANESTHETIST, CERTIFIED REGISTERED

## 2020-10-27 PROCEDURE — 25010000002 ONDANSETRON PER 1 MG: Performed by: NURSE ANESTHETIST, CERTIFIED REGISTERED

## 2020-10-27 PROCEDURE — 81003 URINALYSIS AUTO W/O SCOPE: CPT | Performed by: UROLOGY

## 2020-10-27 PROCEDURE — 88305 TISSUE EXAM BY PATHOLOGIST: CPT | Performed by: UROLOGY

## 2020-10-27 PROCEDURE — 25010000002 FENTANYL CITRATE (PF) 100 MCG/2ML SOLUTION: Performed by: NURSE ANESTHETIST, CERTIFIED REGISTERED

## 2020-10-27 RX ORDER — ACETAMINOPHEN 325 MG/1
650 TABLET ORAL EVERY 6 HOURS
Qty: 30 TABLET | Refills: 0 | Status: SHIPPED | OUTPATIENT
Start: 2020-10-27 | End: 2020-10-30

## 2020-10-27 RX ORDER — ONDANSETRON 2 MG/ML
4 INJECTION INTRAMUSCULAR; INTRAVENOUS ONCE AS NEEDED
Status: DISCONTINUED | OUTPATIENT
Start: 2020-10-27 | End: 2020-10-27 | Stop reason: HOSPADM

## 2020-10-27 RX ORDER — PHENAZOPYRIDINE HYDROCHLORIDE 100 MG/1
100 TABLET, FILM COATED ORAL 3 TIMES DAILY PRN
Qty: 21 TABLET | Refills: 0 | Status: SHIPPED | OUTPATIENT
Start: 2020-10-27 | End: 2020-11-24

## 2020-10-27 RX ORDER — SULFAMETHOXAZOLE AND TRIMETHOPRIM 800; 160 MG/1; MG/1
1 TABLET ORAL 2 TIMES DAILY
Qty: 6 TABLET | Refills: 0 | Status: SHIPPED | OUTPATIENT
Start: 2020-10-27 | End: 2020-11-24

## 2020-10-27 RX ORDER — ONDANSETRON 2 MG/ML
INJECTION INTRAMUSCULAR; INTRAVENOUS AS NEEDED
Status: DISCONTINUED | OUTPATIENT
Start: 2020-10-27 | End: 2020-10-27 | Stop reason: SURG

## 2020-10-27 RX ORDER — MEPERIDINE HYDROCHLORIDE 25 MG/ML
12.5 INJECTION INTRAMUSCULAR; INTRAVENOUS; SUBCUTANEOUS
Status: DISCONTINUED | OUTPATIENT
Start: 2020-10-27 | End: 2020-10-27 | Stop reason: HOSPADM

## 2020-10-27 RX ORDER — OXYCODONE HYDROCHLORIDE 5 MG/1
5 TABLET ORAL EVERY 6 HOURS PRN
Qty: 5 TABLET | Refills: 0 | Status: SHIPPED | OUTPATIENT
Start: 2020-10-27 | End: 2020-11-24

## 2020-10-27 RX ORDER — LIDOCAINE HYDROCHLORIDE 20 MG/ML
INJECTION, SOLUTION INTRAVENOUS AS NEEDED
Status: DISCONTINUED | OUTPATIENT
Start: 2020-10-27 | End: 2020-10-27 | Stop reason: SURG

## 2020-10-27 RX ORDER — MAGNESIUM HYDROXIDE 1200 MG/15ML
LIQUID ORAL AS NEEDED
Status: DISCONTINUED | OUTPATIENT
Start: 2020-10-27 | End: 2020-10-27 | Stop reason: HOSPADM

## 2020-10-27 RX ORDER — DEXAMETHASONE SODIUM PHOSPHATE 4 MG/ML
INJECTION, SOLUTION INTRA-ARTICULAR; INTRALESIONAL; INTRAMUSCULAR; INTRAVENOUS; SOFT TISSUE AS NEEDED
Status: DISCONTINUED | OUTPATIENT
Start: 2020-10-27 | End: 2020-10-27 | Stop reason: SURG

## 2020-10-27 RX ORDER — SODIUM CHLORIDE 9 MG/ML
100 INJECTION, SOLUTION INTRAVENOUS CONTINUOUS
Status: DISCONTINUED | OUTPATIENT
Start: 2020-10-27 | End: 2020-10-27 | Stop reason: HOSPADM

## 2020-10-27 RX ORDER — LORAZEPAM 2 MG/ML
1 INJECTION INTRAMUSCULAR
Status: DISCONTINUED | OUTPATIENT
Start: 2020-10-27 | End: 2020-10-27 | Stop reason: HOSPADM

## 2020-10-27 RX ORDER — PROPOFOL 10 MG/ML
INJECTION, EMULSION INTRAVENOUS AS NEEDED
Status: DISCONTINUED | OUTPATIENT
Start: 2020-10-27 | End: 2020-10-27 | Stop reason: SURG

## 2020-10-27 RX ORDER — ATROPA BELLADONNA AND OPIUM 16.2; 3 MG/1; MG/1
SUPPOSITORY RECTAL AS NEEDED
Status: DISCONTINUED | OUTPATIENT
Start: 2020-10-27 | End: 2020-10-27 | Stop reason: HOSPADM

## 2020-10-27 RX ORDER — DOCUSATE SODIUM 100 MG/1
100 CAPSULE, LIQUID FILLED ORAL 2 TIMES DAILY
Qty: 15 CAPSULE | Refills: 1 | Status: SHIPPED | OUTPATIENT
Start: 2020-10-27

## 2020-10-27 RX ORDER — LEVOFLOXACIN 5 MG/ML
500 INJECTION, SOLUTION INTRAVENOUS ONCE
Status: COMPLETED | OUTPATIENT
Start: 2020-10-27 | End: 2020-10-27

## 2020-10-27 RX ORDER — FENTANYL CITRATE 50 UG/ML
INJECTION, SOLUTION INTRAMUSCULAR; INTRAVENOUS AS NEEDED
Status: DISCONTINUED | OUTPATIENT
Start: 2020-10-27 | End: 2020-10-27 | Stop reason: SURG

## 2020-10-27 RX ADMIN — FENTANYL CITRATE 50 MCG: 50 INJECTION INTRAMUSCULAR; INTRAVENOUS at 07:45

## 2020-10-27 RX ADMIN — ONDANSETRON 4 MG: 2 INJECTION INTRAMUSCULAR; INTRAVENOUS at 07:40

## 2020-10-27 RX ADMIN — SODIUM CHLORIDE 100 ML/HR: 9 INJECTION, SOLUTION INTRAVENOUS at 06:38

## 2020-10-27 RX ADMIN — PROPOFOL 200 MG: 10 INJECTION, EMULSION INTRAVENOUS at 07:40

## 2020-10-27 RX ADMIN — LEVOFLOXACIN 500 MG: 5 INJECTION, SOLUTION INTRAVENOUS at 07:46

## 2020-10-27 RX ADMIN — LIDOCAINE HYDROCHLORIDE 100 MG: 20 INJECTION, SOLUTION INTRAVENOUS at 07:40

## 2020-10-27 RX ADMIN — DEXAMETHASONE SODIUM PHOSPHATE 8 MG: 4 INJECTION, SOLUTION INTRAMUSCULAR; INTRAVENOUS at 07:40

## 2020-10-27 RX ADMIN — FENTANYL CITRATE 50 MCG: 50 INJECTION INTRAMUSCULAR; INTRAVENOUS at 07:56

## 2020-10-27 NOTE — ANESTHESIA POSTPROCEDURE EVALUATION
Patient: Rylan Zimmer    Procedure Summary     Date: 10/27/20 Room / Location: [facility] /  [facility]    Anesthesia Start: 0737 Anesthesia Stop: 0916    Procedure: TRANSURETHRAL RESECTION OF PROSTATE (N/A ) Diagnosis:       Lower urinary tract symptoms (LUTS)      (Lower urinary tract symptoms (LUTS) [R39.9])    Surgeon: Gabriel Echevarria MD Provider: Bill Penny CRNA    Anesthesia Type: general ASA Status: 3          Anesthesia Type: general    Vitals  Vitals Value Taken Time   /51 10/27/20 1012   Temp 98.6 °F (37 °C) 10/27/20 1012   Pulse 65 10/27/20 1013   Resp 14 10/27/20 1012   SpO2 99 % 10/27/20 1013   Vitals shown include unvalidated device data.        Post Anesthesia Care and Evaluation    Patient location during evaluation: PACU  Patient participation: complete - patient participated  Level of consciousness: awake and alert and awake  Pain score: 3  Pain management: adequate  Airway patency: patent  Anesthetic complications: No anesthetic complications  PONV Status: controlled  Cardiovascular status: acceptable, hemodynamically stable and stable  Respiratory status: acceptable and nasal cannula  Hydration status: acceptable

## 2020-10-27 NOTE — ANESTHESIA PROCEDURE NOTES
Airway  Urgency: elective    Date/Time: 10/27/2020 7:45 AM  Airway not difficult    General Information and Staff    Patient location during procedure: OR  CRNA: Bill Penny CRNA    Indications and Patient Condition  Indications for airway management: airway protection    Preoxygenated: yes  Mask difficulty assessment: 1 - vent by mask    Final Airway Details  Final airway type: supraglottic airway      Successful airway: unique  Size 4    Number of attempts at approach: 1    Additional Comments  LMA placed easily without trauma. Dentition and lips as noted pre-induction. Factory cuff pressure to minimal occlusive pressure.

## 2020-10-27 NOTE — ANESTHESIA PREPROCEDURE EVALUATION
Anesthesia Evaluation     NPO Solid Status: > 8 hours  NPO Liquid Status: > 8 hours           Airway   Mallampati: II  TM distance: >3 FB  Neck ROM: full  No difficulty expected  Dental - normal exam     Pulmonary - normal exam   (+) COPD moderate,   Cardiovascular - normal exam    (+) hypertension well controlled 2 medications or greater, past MI , CAD, hyperlipidemia,       Neuro/Psych  GI/Hepatic/Renal/Endo    (+)   renal disease CRI,     Musculoskeletal     Abdominal  - normal exam   Substance History      OB/GYN          Other   arthritis,                      Anesthesia Plan    ASA 3     general     intravenous induction     Anesthetic plan, all risks, benefits, and alternatives have been provided, discussed and informed consent has been obtained with: patient.    Plan discussed with CRNA.

## 2020-10-28 ENCOUNTER — TELEPHONE (OUTPATIENT)
Dept: UROLOGY | Facility: CLINIC | Age: 85
End: 2020-10-28

## 2020-10-29 ENCOUNTER — TELEPHONE (OUTPATIENT)
Dept: UROLOGY | Facility: CLINIC | Age: 85
End: 2020-10-29

## 2020-10-29 DIAGNOSIS — R39.9 LOWER URINARY TRACT SYMPTOMS (LUTS): Primary | ICD-10-CM

## 2020-10-30 LAB
LAB AP CASE REPORT: NORMAL
PATH REPORT.FINAL DX SPEC: NORMAL

## 2020-11-06 ENCOUNTER — OFFICE VISIT (OUTPATIENT)
Dept: UROLOGY | Facility: CLINIC | Age: 85
End: 2020-11-06

## 2020-11-06 ENCOUNTER — TELEPHONE (OUTPATIENT)
Dept: UROLOGY | Facility: CLINIC | Age: 85
End: 2020-11-06

## 2020-11-06 DIAGNOSIS — R39.9 LOWER URINARY TRACT SYMPTOMS (LUTS): Primary | ICD-10-CM

## 2020-11-06 PROCEDURE — 99024 POSTOP FOLLOW-UP VISIT: CPT | Performed by: UROLOGY

## 2020-11-06 NOTE — PROGRESS NOTES
Chief Complaint  LUTS    HPI  Mr. Zimmer is a 84 y.o. male with history of HTN who presents with LUTS.    He presents for telephone follow up after TURP.  He says he is doing well.  His stream strength is increased.  He still has blood in his urine daily but he says it is not that much.  He denies any fevers or pain.  He states that he still has the urgency and frequency, approximately the same as before surgery at this point.    For historical review,  Previous treatments include: dutasteride, tamsulosin  Formerly seen by Dr. Luna and was scheduled for TURP  He has had 3 negative prostate biopsies - last done at  possibly  no Constipation  no Diarrhea  no History of kidney stones  IPSS Questionnaire (AUA-7):  Over the past month…    1)  Incomplete Emptying  How often have you had a sensation of not emptying your bladder?  1 - Less than 1 time in 5   2)  Frequency  How often have you had to urinate less than every two hours? 1 - Less than 1 time in 5   3)  Intermittency  How often have you found you stopped and started again several times when you urinated?  1 - Less than 1 time in 5   4) Urgency  How often have you found it difficult to postpone urination?  4 - More than half the time   5) Weak Stream  How often have you had a weak urinary stream?  4 - More than half the time   6) Straining  How often have you had to push or strain to begin urination?  1 - Less than 1 time in 5   7) Nocturia  How many times did you typically get up at night to urinate?  2 - 2 times   Total Score:  14       Quality of life due to urinary symptoms:  If you were to spend the rest of your life with your urinary condition the way it is now, how would you feel about that? 3-Mixed   Urine Leakage (Incontinence) 0-No Leakage       Past Medical History  Past Medical History:   Diagnosis Date   • Arthritis    • Colon polyps    • COPD (chronic obstructive pulmonary disease) (CMS/[facility])    • Ecchymosis    • Elevated cholesterol    •  Heart attack (CMS/HCC)    • Hypertension    • Kidney stones    • Wears glasses        Past Surgical History  Past Surgical History:   Procedure Laterality Date   • CARDIAC CATHETERIZATION  2010    cardiac stent times one, patient states that 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   • COLONOSCOPY     • CYSTOSCOPY TRANSURETHRAL RESECTION OF PROSTATE N/A 10/27/2020    Procedure: TRANSURETHRAL RESECTION OF PROSTATE;  Surgeon: Gabriel Echevarria MD;  Location: [facility];  Service: Urology;  Laterality: N/A;   • ROTATOR CUFF REPAIR Right 2008   • TONSILLECTOMY         Medications    Current Outpatient Medications:   •  amLODIPine (NORVASC) 2.5 MG tablet, TAKE 1 TABLET BY MOUTH EVERY NIGHT, Disp: 90 tablet, Rfl: 3  •  aspirin 81 MG tablet, Take  by mouth Daily., Disp: , Rfl:   •  carvedilol (COREG) 12.5 MG tablet, 2 (Two) Times a Day., Disp: , Rfl: 0  •  clonazePAM (KlonoPIN) 0.5 MG tablet, TAKE 1 TO 2 TABLETS BY MOUTH EVERY NIGHT AT BEDTIME AS NEEDED, Disp: 60 tablet, Rfl: 2  •  CRESTOR 20 MG tablet, Every Night., Disp: , Rfl: 0  •  diclofenac (VOLTAREN) 1 % gel gel, APPLY GEL TOPICALLY TO APPROPRIATE AREA AS DIRECTED, Disp: 100 g, Rfl: 2  •  docusate sodium (Colace) 100 MG capsule, Take 1 capsule by mouth 2 (Two) Times a Day. If taking percocet, Disp: 15 capsule, Rfl: 1  •  DULoxetine (CYMBALTA) 60 MG capsule, TAKE 1 CAPSULE BY MOUTH EVERY DAY, Disp: 90 capsule, Rfl: 1  •  ezetimibe (Zetia) 10 MG tablet, Take 1 tablet by mouth Daily. Continue crestor, Disp: 90 tablet, Rfl: 3  •  fluticasone (FLONASE) 50 MCG/ACT nasal spray, instill 1 spray into each nostril twice a day, Disp: 1 bottle, Rfl: 3  •  oxyCODONE (Roxicodone) 5 MG immediate release tablet, Take 1 tablet by mouth Every 6 (Six) Hours As Needed for Moderate Pain  or Severe Pain ., Disp: 5 tablet, Rfl: 0  •  paricalcitol (ZEMPLAR) 1 MCG capsule, , Disp: , Rfl: 0  •  phenazopyridine (PYRIDIUM) 100 MG tablet, Take 1 tablet by mouth 3  (Three) Times a Day As Needed (urinary burning)., Disp: 21 tablet, Rfl: 0  •  predniSONE (DELTASONE) 10 MG (48) dose pack, Take  by mouth See Admin Instructions., Disp: 1 each, Rfl: 0  •  sulfamethoxazole-trimethoprim (Bactrim DS) 800-160 MG per tablet, Take 1 tablet by mouth 2 (Two) Times a Day., Disp: 6 tablet, Rfl: 0  •  sulfaSALAzine (AZULFIDINE) 500 MG tablet, 2 (Two) Times a Day., Disp: , Rfl: 0  •  Tudorza Pressair 400 MCG/ACT aerosol powder  powder for inhalation, INHALE 1 PUFF BY MOUTH TWICE DAILY, Disp: 1 each, Rfl: 11  •  zolpidem CR (AMBIEN CR) 12.5 MG CR tablet, Daily., Disp: , Rfl: 0    Allergies  Allergies   Allergen Reactions   • Quinine Derivatives Itching   • Penicillins Rash       Social History  Social History     Socioeconomic History   • Marital status:      Spouse name: Not on file   • Number of children: Not on file   • Years of education: Not on file   • Highest education level: Not on file   Tobacco Use   • Smoking status: Former Smoker     Types: Cigarettes     Quit date: 2010     Years since quitting: 10.8   • Smokeless tobacco: Never Used   Substance and Sexual Activity   • Alcohol use: Never     Frequency: Never   • Drug use: Never   • Sexual activity: Defer       Family History  He has no family history of prostate cancer    Review of Systems  Constitutional: No fevers or chills  Skin: Negative for rash  Endocrine: No heat/cold intolerance   Cardiovascular: Negative for chest pain or dyspnea on exertion  Respiratory: Negative for shortness of breath or wheezing  Gastrointestinal: No nausea or vomiting  Genitourinary: Negative for current gross hematuria.  Musculoskeletal: No flank pain  Neurological:  Negative for frequent headaches or dizziness  Lymph/Heme: Negative for leg swelling or calf pain.    Physical Exam  There were no vitals taken for this visit.  Constitutional: NAD, WDWN.   HEENT: NCAT. Conjunctivae normal.  MMM.    Cardiovascular: Regular rate.  Pulmonary/Chest:  Respirations are even and non-labored bilaterally.  Abdominal: Soft. No distension, tenderness, masses or guarding. No CVA tenderness.  Neurological: A + O x 3.  Cranial Nerves II-XII grossly intact. Normal gait.  Extremities: FELTON x 4, Warm. No clubbing.  No cyanosis.    Skin: Pink, warm and dry.  No rashes noted.  Psychiatric:  Normal mood and affect    Genitourinary  Testicles descended bilaterally.  Uncircumcised phallus.  Normal external genital exam.    Labs  No results found for: PSA    Brief Urine Lab Results  (Last result in the past 365 days)      Color   Clarity   Blood   Leuk Est   Nitrite   Protein   CREAT   Urine HCG        10/27/20 0635 Yellow Clear Negative Negative Negative Trace                   Assessment  Mr. Zimmer is a 84 y.o. male who presents with LUTS, primarily weak stream and urgency. Urgency has improved now off medicine, but still has very weak stream. 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 He is s/p TURP 10/27/20. Doing well, increased stream strength. Still has some blood in his urine occasionally.  He denies any pain or fevers.    I reassured him that the urgency and frequency would likely improve but they would take longer than the stream strength.  It is very common to have them increase in the first couple weeks after the procedure.  I also reassured him that the blood in the urine would subside as long as it was not getting worse.  I instructed him to stop his aspirin for a week.    Plan  1.    FU as planned on 12/3/20    This visit has been rescheduled as a phone visit to comply with patient safety concerns in accordance with CDC recommendations. Total time of discussion was 22 minutes.      Gabriel Echevarria MD

## 2020-11-13 RX ORDER — SULFASALAZINE 500 MG/1
TABLET ORAL
Refills: 0 | OUTPATIENT
Start: 2020-11-13

## 2020-11-13 RX ORDER — ACLIDINIUM BROMIDE 400 UG/1
1 POWDER, METERED RESPIRATORY (INHALATION) 2 TIMES DAILY
Qty: 1 EACH | Refills: 11 | Status: SHIPPED | OUTPATIENT
Start: 2020-11-13 | End: 2021-03-25

## 2020-11-13 NOTE — TELEPHONE ENCOUNTER
Caller: JOCE    Relationship: PHARMACY    Best call back number:[phone]    Medication needed:   Requested Prescriptions     Pending Prescriptions Disp Refills   • aclidinium bromide (Tudorza Pressair) 400 MCG/ACT aerosol powder  powder for inhalation 1 each 11     Sig: Inhale 1 puff 2 (Two) Times a Day.   • sulfaSALAzine (AZULFIDINE) 500 MG tablet   0       When do you need the refill by: TODAY    What details did the patient provide when requesting the medication: PT IS OUT OF MEDICATIONS.  PT WAS UNSURE IF HE STILL NEEDED TO TAKE THE SULFASALAZINE.    Does the patient have less than a 3 day supply:  [x] Yes  [] No    What is the patient's preferred pharmacy: [facility] [address]  - [phone]  - [phone] FX

## 2020-11-24 ENCOUNTER — HOSPITAL ENCOUNTER (OUTPATIENT)
Facility: HOSPITAL | Age: 85
Setting detail: OBSERVATION
Discharge: HOME | End: 2020-11-26
Attending: EMERGENCY MEDICINE | Admitting: INTERNAL MEDICINE

## 2020-11-24 ENCOUNTER — APPOINTMENT (OUTPATIENT)
Dept: CT IMAGING | Facility: HOSPITAL | Age: 85
End: 2020-11-24

## 2020-11-24 DIAGNOSIS — S29.8XXA BLUNT TRAUMA TO CHEST, INITIAL ENCOUNTER: ICD-10-CM

## 2020-11-24 DIAGNOSIS — M15.9 GENERALIZED OSTEOARTHRITIS: ICD-10-CM

## 2020-11-24 DIAGNOSIS — R10.11 RIGHT UPPER QUADRANT ABDOMINAL PAIN: ICD-10-CM

## 2020-11-24 DIAGNOSIS — I25.10 ATHEROSCLEROSIS OF NATIVE CORONARY ARTERY OF NATIVE HEART WITHOUT ANGINA PECTORIS: ICD-10-CM

## 2020-11-24 DIAGNOSIS — N39.0 BACTERIAL UTI: ICD-10-CM

## 2020-11-24 DIAGNOSIS — I73.9 PVD (PERIPHERAL VASCULAR DISEASE) (HCC): ICD-10-CM

## 2020-11-24 DIAGNOSIS — J43.8 OTHER EMPHYSEMA (HCC): ICD-10-CM

## 2020-11-24 DIAGNOSIS — G62.9 NEUROPATHY: ICD-10-CM

## 2020-11-24 DIAGNOSIS — A49.9 BACTERIAL UTI: ICD-10-CM

## 2020-11-24 DIAGNOSIS — R39.9 LOWER URINARY TRACT SYMPTOMS (LUTS): ICD-10-CM

## 2020-11-24 DIAGNOSIS — M25.559 ARTHRALGIA OF HIP, UNSPECIFIED LATERALITY: ICD-10-CM

## 2020-11-24 DIAGNOSIS — I71.40 ABDOMINAL AORTIC ANEURYSM (AAA) WITHOUT RUPTURE (HCC): ICD-10-CM

## 2020-11-24 DIAGNOSIS — R29.6 MULTIPLE FALLS: Primary | ICD-10-CM

## 2020-11-24 LAB
ALBUMIN SERPL-MCNC: 4.2 G/DL (ref 3.5–5.2)
ALBUMIN/GLOB SERPL: 1.4 G/DL
ALP SERPL-CCNC: 81 U/L (ref 39–117)
ALT SERPL W P-5'-P-CCNC: 15 U/L (ref 1–41)
ANION GAP SERPL CALCULATED.3IONS-SCNC: 11 MMOL/L (ref 5–15)
AST SERPL-CCNC: 16 U/L (ref 1–40)
BACTERIA UR QL AUTO: ABNORMAL /HPF
BASOPHILS # BLD AUTO: 0.06 10*3/MM3 (ref 0–0.2)
BASOPHILS NFR BLD AUTO: 0.6 % (ref 0–1.5)
BILIRUB SERPL-MCNC: 0.3 MG/DL (ref 0–1.2)
BILIRUB UR QL STRIP: NEGATIVE
BUN SERPL-MCNC: 28 MG/DL (ref 8–23)
BUN/CREAT SERPL: 17.4 (ref 7–25)
CALCIUM SPEC-SCNC: 9.4 MG/DL (ref 8.6–10.5)
CHLORIDE SERPL-SCNC: 104 MMOL/L (ref 98–107)
CLARITY UR: ABNORMAL
CO2 SERPL-SCNC: 24 MMOL/L (ref 22–29)
COLOR UR: YELLOW
CREAT SERPL-MCNC: 1.61 MG/DL (ref 0.76–1.27)
DEPRECATED RDW RBC AUTO: 52.1 FL (ref 37–54)
EOSINOPHIL # BLD AUTO: 0.26 10*3/MM3 (ref 0–0.4)
EOSINOPHIL NFR BLD AUTO: 2.6 % (ref 0.3–6.2)
ERYTHROCYTE [DISTWIDTH] IN BLOOD BY AUTOMATED COUNT: 14.4 % (ref 12.3–15.4)
GFR SERPL CREATININE-BSD FRML MDRD: 41 ML/MIN/1.73
GLOBULIN UR ELPH-MCNC: 2.9 GM/DL
GLUCOSE SERPL-MCNC: 103 MG/DL (ref 65–99)
GLUCOSE UR STRIP-MCNC: NEGATIVE MG/DL
HCT VFR BLD AUTO: 40.3 % (ref 37.5–51)
HGB BLD-MCNC: 12.6 G/DL (ref 13–17.7)
HGB UR QL STRIP.AUTO: ABNORMAL
HYALINE CASTS UR QL AUTO: ABNORMAL /LPF
IMM GRANULOCYTES # BLD AUTO: 0.07 10*3/MM3 (ref 0–0.05)
IMM GRANULOCYTES NFR BLD AUTO: 0.7 % (ref 0–0.5)
KETONES UR QL STRIP: NEGATIVE
LEUKOCYTE ESTERASE UR QL STRIP.AUTO: ABNORMAL
LYMPHOCYTES # BLD AUTO: 1.13 10*3/MM3 (ref 0.7–3.1)
LYMPHOCYTES NFR BLD AUTO: 11.2 % (ref 19.6–45.3)
MCH RBC QN AUTO: 30.7 PG (ref 26.6–33)
MCHC RBC AUTO-ENTMCNC: 31.3 G/DL (ref 31.5–35.7)
MCV RBC AUTO: 98.1 FL (ref 79–97)
MONOCYTES # BLD AUTO: 0.99 10*3/MM3 (ref 0.1–0.9)
MONOCYTES NFR BLD AUTO: 9.8 % (ref 5–12)
NEUTROPHILS NFR BLD AUTO: 7.62 10*3/MM3 (ref 1.7–7)
NEUTROPHILS NFR BLD AUTO: 75.1 % (ref 42.7–76)
NITRITE UR QL STRIP: NEGATIVE
NRBC BLD AUTO-RTO: 0 /100 WBC (ref 0–0.2)
PH UR STRIP.AUTO: <=5 [PH] (ref 5–8)
PLATELET # BLD AUTO: 278 10*3/MM3 (ref 140–450)
PMV BLD AUTO: 9.5 FL (ref 6–12)
POTASSIUM SERPL-SCNC: 4.8 MMOL/L (ref 3.5–5.2)
PROT SERPL-MCNC: 7.1 G/DL (ref 6–8.5)
PROT UR QL STRIP: ABNORMAL
RBC # BLD AUTO: 4.11 10*6/MM3 (ref 4.14–5.8)
RBC # UR: ABNORMAL /HPF
REF LAB TEST METHOD: ABNORMAL
SODIUM SERPL-SCNC: 139 MMOL/L (ref 136–145)
SP GR UR STRIP: 1.02 (ref 1–1.03)
SQUAMOUS #/AREA URNS HPF: ABNORMAL /HPF
TROPONIN T SERPL-MCNC: <0.01 NG/ML (ref 0–0.03)
UROBILINOGEN UR QL STRIP: ABNORMAL
WBC # BLD AUTO: 10.13 10*3/MM3 (ref 3.4–10.8)
WBC UR QL AUTO: ABNORMAL /HPF

## 2020-11-24 PROCEDURE — 93005 ELECTROCARDIOGRAM TRACING: CPT | Performed by: EMERGENCY MEDICINE

## 2020-11-24 PROCEDURE — 25010000002 CEFTRIAXONE PER 250 MG: Performed by: EMERGENCY MEDICINE

## 2020-11-24 PROCEDURE — G0378 HOSPITAL OBSERVATION PER HR: HCPCS

## 2020-11-24 PROCEDURE — 85025 COMPLETE CBC W/AUTO DIFF WBC: CPT | Performed by: EMERGENCY MEDICINE

## 2020-11-24 PROCEDURE — 80053 COMPREHEN METABOLIC PANEL: CPT | Performed by: EMERGENCY MEDICINE

## 2020-11-24 PROCEDURE — 99285 EMERGENCY DEPT VISIT HI MDM: CPT

## 2020-11-24 PROCEDURE — 81001 URINALYSIS AUTO W/SCOPE: CPT | Performed by: EMERGENCY MEDICINE

## 2020-11-24 PROCEDURE — 96361 HYDRATE IV INFUSION ADD-ON: CPT

## 2020-11-24 PROCEDURE — 99220 PR INITIAL OBSERVATION CARE/DAY 70 MINUTES: CPT | Performed by: INTERNAL MEDICINE

## 2020-11-24 PROCEDURE — 84484 ASSAY OF TROPONIN QUANT: CPT | Performed by: EMERGENCY MEDICINE

## 2020-11-24 PROCEDURE — 70450 CT HEAD/BRAIN W/O DYE: CPT

## 2020-11-24 PROCEDURE — 74176 CT ABD & PELVIS W/O CONTRAST: CPT

## 2020-11-24 PROCEDURE — 87086 URINE CULTURE/COLONY COUNT: CPT | Performed by: EMERGENCY MEDICINE

## 2020-11-24 PROCEDURE — 71250 CT THORAX DX C-: CPT

## 2020-11-24 PROCEDURE — 96365 THER/PROPH/DIAG IV INF INIT: CPT

## 2020-11-24 PROCEDURE — 97161 PT EVAL LOW COMPLEX 20 MIN: CPT

## 2020-11-24 RX ORDER — CLONAZEPAM 0.5 MG/1
0.5 TABLET ORAL 2 TIMES DAILY PRN
Status: DISCONTINUED | OUTPATIENT
Start: 2020-11-24 | End: 2020-11-26 | Stop reason: HOSPADM

## 2020-11-24 RX ORDER — SODIUM CHLORIDE 0.9 % (FLUSH) 0.9 %
10 SYRINGE (ML) INJECTION EVERY 12 HOURS SCHEDULED
Status: DISCONTINUED | OUTPATIENT
Start: 2020-11-24 | End: 2020-11-26 | Stop reason: HOSPADM

## 2020-11-24 RX ORDER — AMLODIPINE BESYLATE 5 MG/1
2.5 TABLET ORAL NIGHTLY
Status: DISCONTINUED | OUTPATIENT
Start: 2020-11-24 | End: 2020-11-26 | Stop reason: HOSPADM

## 2020-11-24 RX ORDER — DOCUSATE SODIUM 100 MG/1
100 CAPSULE, LIQUID FILLED ORAL 2 TIMES DAILY
Status: DISCONTINUED | OUTPATIENT
Start: 2020-11-24 | End: 2020-11-26 | Stop reason: HOSPADM

## 2020-11-24 RX ORDER — PREDNISONE 1 MG/1
10 TABLET ORAL
Status: DISCONTINUED | OUTPATIENT
Start: 2020-11-25 | End: 2020-11-26 | Stop reason: HOSPADM

## 2020-11-24 RX ORDER — SODIUM CHLORIDE 0.9 % (FLUSH) 0.9 %
10 SYRINGE (ML) INJECTION AS NEEDED
Status: DISCONTINUED | OUTPATIENT
Start: 2020-11-24 | End: 2020-11-26 | Stop reason: HOSPADM

## 2020-11-24 RX ORDER — OXYCODONE HYDROCHLORIDE AND ACETAMINOPHEN 5; 325 MG/1; MG/1
1 TABLET ORAL ONCE
Status: COMPLETED | OUTPATIENT
Start: 2020-11-24 | End: 2020-11-24

## 2020-11-24 RX ORDER — CARVEDILOL 12.5 MG/1
12.5 TABLET ORAL 2 TIMES DAILY WITH MEALS
Status: DISCONTINUED | OUTPATIENT
Start: 2020-11-24 | End: 2020-11-26 | Stop reason: HOSPADM

## 2020-11-24 RX ORDER — ROSUVASTATIN CALCIUM 20 MG/1
20 TABLET, COATED ORAL NIGHTLY
Status: DISCONTINUED | OUTPATIENT
Start: 2020-11-24 | End: 2020-11-26 | Stop reason: HOSPADM

## 2020-11-24 RX ORDER — ASPIRIN 81 MG/1
81 TABLET ORAL DAILY
Status: DISCONTINUED | OUTPATIENT
Start: 2020-11-25 | End: 2020-11-26 | Stop reason: HOSPADM

## 2020-11-24 RX ORDER — OXYCODONE HYDROCHLORIDE AND ACETAMINOPHEN 5; 325 MG/1; MG/1
1 TABLET ORAL EVERY 6 HOURS PRN
Qty: 8 TABLET | Refills: 0 | Status: SHIPPED | OUTPATIENT
Start: 2020-11-24 | End: 2020-11-26 | Stop reason: HOSPADM

## 2020-11-24 RX ORDER — CHOLECALCIFEROL (VITAMIN D3) 125 MCG
5 CAPSULE ORAL NIGHTLY PRN
Status: DISCONTINUED | OUTPATIENT
Start: 2020-11-24 | End: 2020-11-26 | Stop reason: HOSPADM

## 2020-11-24 RX ORDER — ACETAMINOPHEN 325 MG/1
650 TABLET ORAL EVERY 4 HOURS PRN
Status: DISCONTINUED | OUTPATIENT
Start: 2020-11-24 | End: 2020-11-26 | Stop reason: HOSPADM

## 2020-11-24 RX ORDER — OXYCODONE HYDROCHLORIDE AND ACETAMINOPHEN 5; 325 MG/1; MG/1
1 TABLET ORAL EVERY 6 HOURS PRN
Status: DISCONTINUED | OUTPATIENT
Start: 2020-11-24 | End: 2020-11-26 | Stop reason: HOSPADM

## 2020-11-24 RX ORDER — SULFASALAZINE 500 MG/1
500 TABLET ORAL EVERY 12 HOURS SCHEDULED
Status: DISCONTINUED | OUTPATIENT
Start: 2020-11-24 | End: 2020-11-26 | Stop reason: HOSPADM

## 2020-11-24 RX ORDER — CEFUROXIME AXETIL 500 MG/1
500 TABLET ORAL 2 TIMES DAILY
Qty: 20 TABLET | Refills: 0 | Status: SHIPPED | OUTPATIENT
Start: 2020-11-24 | End: 2020-11-26 | Stop reason: HOSPADM

## 2020-11-24 RX ORDER — TEMAZEPAM 7.5 MG/1
7.5 CAPSULE ORAL NIGHTLY
Status: DISCONTINUED | OUTPATIENT
Start: 2020-11-24 | End: 2020-11-26 | Stop reason: HOSPADM

## 2020-11-24 RX ORDER — DULOXETIN HYDROCHLORIDE 60 MG/1
60 CAPSULE, DELAYED RELEASE ORAL DAILY
Status: DISCONTINUED | OUTPATIENT
Start: 2020-11-24 | End: 2020-11-26 | Stop reason: HOSPADM

## 2020-11-24 RX ORDER — ROSUVASTATIN CALCIUM 20 MG/1
20 TABLET, COATED ORAL NIGHTLY
COMMUNITY
End: 2021-09-20

## 2020-11-24 RX ADMIN — SODIUM CHLORIDE, PRESERVATIVE FREE 10 ML: 5 INJECTION INTRAVENOUS at 22:17

## 2020-11-24 RX ADMIN — SULFASALAZINE 500 MG: 500 TABLET ORAL at 22:14

## 2020-11-24 RX ADMIN — TEMAZEPAM 7.5 MG: 7.5 CAPSULE ORAL at 22:15

## 2020-11-24 RX ADMIN — CEFTRIAXONE 1 G: 1 INJECTION, POWDER, FOR SOLUTION INTRAMUSCULAR; INTRAVENOUS at 10:53

## 2020-11-24 RX ADMIN — SODIUM CHLORIDE 1000 ML: 9 INJECTION, SOLUTION INTRAVENOUS at 09:27

## 2020-11-24 RX ADMIN — AMLODIPINE BESYLATE 2.5 MG: 5 TABLET ORAL at 22:14

## 2020-11-24 RX ADMIN — CARVEDILOL 12.5 MG: 12.5 TABLET, FILM COATED ORAL at 17:27

## 2020-11-24 RX ADMIN — DOCUSATE SODIUM 100 MG: 100 CAPSULE, LIQUID FILLED ORAL at 22:15

## 2020-11-24 RX ADMIN — CLONAZEPAM 0.5 MG: 0.5 TABLET ORAL at 21:45

## 2020-11-24 RX ADMIN — ROSUVASTATIN CALCIUM 20 MG: 20 TABLET, COATED ORAL at 22:14

## 2020-11-24 RX ADMIN — OXYCODONE HYDROCHLORIDE AND ACETAMINOPHEN 1 TABLET: 5; 325 TABLET ORAL at 11:51

## 2020-11-24 RX ADMIN — OXYCODONE AND ACETAMINOPHEN 1 TABLET: 5; 325 TABLET ORAL at 23:36

## 2020-11-24 RX ADMIN — OXYCODONE AND ACETAMINOPHEN 1 TABLET: 5; 325 TABLET ORAL at 17:27

## 2020-11-24 RX ADMIN — DULOXETINE HYDROCHLORIDE 60 MG: 60 CAPSULE, DELAYED RELEASE ORAL at 16:03

## 2020-11-25 LAB
ANION GAP SERPL CALCULATED.3IONS-SCNC: 9 MMOL/L (ref 5–15)
BACTERIA SPEC AEROBE CULT: NO GROWTH
BUN SERPL-MCNC: 28 MG/DL (ref 8–23)
BUN/CREAT SERPL: 19 (ref 7–25)
CALCIUM SPEC-SCNC: 9.2 MG/DL (ref 8.6–10.5)
CHLORIDE SERPL-SCNC: 111 MMOL/L (ref 98–107)
CO2 SERPL-SCNC: 23 MMOL/L (ref 22–29)
CREAT SERPL-MCNC: 1.47 MG/DL (ref 0.76–1.27)
DEPRECATED RDW RBC AUTO: 52.5 FL (ref 37–54)
ERYTHROCYTE [DISTWIDTH] IN BLOOD BY AUTOMATED COUNT: 14.4 % (ref 12.3–15.4)
GFR SERPL CREATININE-BSD FRML MDRD: 46 ML/MIN/1.73
GLUCOSE SERPL-MCNC: 116 MG/DL (ref 65–99)
HCT VFR BLD AUTO: 40.4 % (ref 37.5–51)
HGB BLD-MCNC: 12.2 G/DL (ref 13–17.7)
MCH RBC QN AUTO: 29.8 PG (ref 26.6–33)
MCHC RBC AUTO-ENTMCNC: 30.2 G/DL (ref 31.5–35.7)
MCV RBC AUTO: 98.8 FL (ref 79–97)
PLATELET # BLD AUTO: 290 10*3/MM3 (ref 140–450)
PMV BLD AUTO: 9.7 FL (ref 6–12)
POTASSIUM SERPL-SCNC: 5.3 MMOL/L (ref 3.5–5.2)
RBC # BLD AUTO: 4.09 10*6/MM3 (ref 4.14–5.8)
SODIUM SERPL-SCNC: 143 MMOL/L (ref 136–145)
WBC # BLD AUTO: 9.23 10*3/MM3 (ref 3.4–10.8)

## 2020-11-25 PROCEDURE — 97165 OT EVAL LOW COMPLEX 30 MIN: CPT

## 2020-11-25 PROCEDURE — 63710000001 PREDNISONE PER 5 MG: Performed by: INTERNAL MEDICINE

## 2020-11-25 PROCEDURE — 85027 COMPLETE CBC AUTOMATED: CPT | Performed by: INTERNAL MEDICINE

## 2020-11-25 PROCEDURE — 97110 THERAPEUTIC EXERCISES: CPT

## 2020-11-25 PROCEDURE — 99225 PR SBSQ OBSERVATION CARE/DAY 25 MINUTES: CPT | Performed by: INTERNAL MEDICINE

## 2020-11-25 PROCEDURE — G0378 HOSPITAL OBSERVATION PER HR: HCPCS

## 2020-11-25 PROCEDURE — 80048 BASIC METABOLIC PNL TOTAL CA: CPT | Performed by: INTERNAL MEDICINE

## 2020-11-25 PROCEDURE — 97535 SELF CARE MNGMENT TRAINING: CPT

## 2020-11-25 PROCEDURE — 97116 GAIT TRAINING THERAPY: CPT

## 2020-11-25 PROCEDURE — 25010000002 CEFTRIAXONE PER 250 MG: Performed by: INTERNAL MEDICINE

## 2020-11-25 RX ORDER — LIDOCAINE 50 MG/G
1 PATCH TOPICAL
Status: DISCONTINUED | OUTPATIENT
Start: 2020-11-25 | End: 2020-11-26 | Stop reason: HOSPADM

## 2020-11-25 RX ORDER — TRAMADOL HYDROCHLORIDE 50 MG/1
50 TABLET ORAL EVERY 6 HOURS PRN
Status: DISCONTINUED | OUTPATIENT
Start: 2020-11-25 | End: 2020-11-26 | Stop reason: HOSPADM

## 2020-11-25 RX ADMIN — ACETAMINOPHEN 650 MG: 325 TABLET, FILM COATED ORAL at 08:51

## 2020-11-25 RX ADMIN — CLONAZEPAM 0.5 MG: 0.5 TABLET ORAL at 07:47

## 2020-11-25 RX ADMIN — SODIUM CHLORIDE, PRESERVATIVE FREE 10 ML: 5 INJECTION INTRAVENOUS at 22:07

## 2020-11-25 RX ADMIN — DULOXETINE HYDROCHLORIDE 60 MG: 60 CAPSULE, DELAYED RELEASE ORAL at 08:51

## 2020-11-25 RX ADMIN — ROSUVASTATIN CALCIUM 20 MG: 20 TABLET, COATED ORAL at 22:05

## 2020-11-25 RX ADMIN — CARVEDILOL 12.5 MG: 12.5 TABLET, FILM COATED ORAL at 07:42

## 2020-11-25 RX ADMIN — DOCUSATE SODIUM 100 MG: 100 CAPSULE, LIQUID FILLED ORAL at 08:51

## 2020-11-25 RX ADMIN — DOCUSATE SODIUM 100 MG: 100 CAPSULE, LIQUID FILLED ORAL at 22:06

## 2020-11-25 RX ADMIN — SODIUM CHLORIDE, PRESERVATIVE FREE 10 ML: 5 INJECTION INTRAVENOUS at 10:20

## 2020-11-25 RX ADMIN — AMLODIPINE BESYLATE 2.5 MG: 5 TABLET ORAL at 22:06

## 2020-11-25 RX ADMIN — TEMAZEPAM 7.5 MG: 7.5 CAPSULE ORAL at 22:06

## 2020-11-25 RX ADMIN — CEFTRIAXONE SODIUM 1 G: 1 INJECTION, POWDER, FOR SOLUTION INTRAMUSCULAR; INTRAVENOUS at 08:52

## 2020-11-25 RX ADMIN — CARVEDILOL 12.5 MG: 12.5 TABLET, FILM COATED ORAL at 17:54

## 2020-11-25 RX ADMIN — PREDNISONE 10 MG: 5 TABLET ORAL at 07:42

## 2020-11-25 RX ADMIN — ASPIRIN 81 MG: 81 TABLET, FILM COATED ORAL at 08:51

## 2020-11-26 ENCOUNTER — READMISSION MANAGEMENT (OUTPATIENT)
Dept: CALL CENTER | Facility: HOSPITAL | Age: 85
End: 2020-11-26

## 2020-11-26 VITALS
SYSTOLIC BLOOD PRESSURE: 182 MMHG | HEART RATE: 70 BPM | BODY MASS INDEX: 25.03 KG/M2 | WEIGHT: 169 LBS | OXYGEN SATURATION: 93 % | TEMPERATURE: 97.7 F | DIASTOLIC BLOOD PRESSURE: 79 MMHG | HEIGHT: 69 IN | RESPIRATION RATE: 18 BRPM

## 2020-11-26 PROBLEM — N39.0 ACUTE UTI (URINARY TRACT INFECTION): Status: ACTIVE | Noted: 2020-11-26

## 2020-11-26 PROBLEM — R10.9 LEFT SIDED ABDOMINAL PAIN: Status: ACTIVE | Noted: 2020-11-26

## 2020-11-26 LAB
ANION GAP SERPL CALCULATED.3IONS-SCNC: 8 MMOL/L (ref 5–15)
BUN SERPL-MCNC: 29 MG/DL (ref 8–23)
BUN/CREAT SERPL: 17.8 (ref 7–25)
CALCIUM SPEC-SCNC: 9.5 MG/DL (ref 8.6–10.5)
CHLORIDE SERPL-SCNC: 110 MMOL/L (ref 98–107)
CO2 SERPL-SCNC: 25 MMOL/L (ref 22–29)
CREAT SERPL-MCNC: 1.63 MG/DL (ref 0.76–1.27)
GFR SERPL CREATININE-BSD FRML MDRD: 41 ML/MIN/1.73
GLUCOSE SERPL-MCNC: 109 MG/DL (ref 65–99)
POTASSIUM SERPL-SCNC: 4.8 MMOL/L (ref 3.5–5.2)
SODIUM SERPL-SCNC: 143 MMOL/L (ref 136–145)

## 2020-11-26 PROCEDURE — 63710000001 PREDNISONE PER 5 MG: Performed by: INTERNAL MEDICINE

## 2020-11-26 PROCEDURE — 80048 BASIC METABOLIC PNL TOTAL CA: CPT | Performed by: INTERNAL MEDICINE

## 2020-11-26 PROCEDURE — G0378 HOSPITAL OBSERVATION PER HR: HCPCS

## 2020-11-26 PROCEDURE — 96366 THER/PROPH/DIAG IV INF ADDON: CPT

## 2020-11-26 PROCEDURE — 99217 PR OBSERVATION CARE DISCHARGE MANAGEMENT: CPT | Performed by: PHYSICIAN ASSISTANT

## 2020-11-26 PROCEDURE — 25010000002 CEFTRIAXONE PER 250 MG: Performed by: INTERNAL MEDICINE

## 2020-11-26 RX ORDER — TRAMADOL HYDROCHLORIDE 50 MG/1
50 TABLET ORAL EVERY 6 HOURS PRN
Qty: 12 TABLET | Refills: 0 | Status: SHIPPED | OUTPATIENT
Start: 2020-11-26 | End: 2020-11-29

## 2020-11-26 RX ORDER — CEFDINIR 300 MG/1
300 CAPSULE ORAL 2 TIMES DAILY
Qty: 14 CAPSULE | Refills: 0 | Status: SHIPPED | OUTPATIENT
Start: 2020-11-27 | End: 2020-12-04

## 2020-11-26 RX ORDER — PREDNISONE 10 MG/1
10 TABLET ORAL
Qty: 15 TABLET | Refills: 0 | Status: SHIPPED | OUTPATIENT
Start: 2020-11-27 | End: 2021-04-28

## 2020-11-26 RX ORDER — OXYCODONE HYDROCHLORIDE AND ACETAMINOPHEN 5; 325 MG/1; MG/1
1 TABLET ORAL EVERY 6 HOURS PRN
Qty: 12 TABLET | Refills: 0 | Status: SHIPPED | OUTPATIENT
Start: 2020-11-26 | End: 2020-11-29

## 2020-11-26 RX ADMIN — OXYCODONE AND ACETAMINOPHEN 1 TABLET: 5; 325 TABLET ORAL at 01:36

## 2020-11-26 RX ADMIN — LIDOCAINE 1 PATCH: 50 PATCH CUTANEOUS at 08:02

## 2020-11-26 RX ADMIN — OXYCODONE AND ACETAMINOPHEN 1 TABLET: 5; 325 TABLET ORAL at 08:03

## 2020-11-26 RX ADMIN — SULFASALAZINE 500 MG: 500 TABLET ORAL at 08:04

## 2020-11-26 RX ADMIN — DULOXETINE HYDROCHLORIDE 60 MG: 60 CAPSULE, DELAYED RELEASE ORAL at 08:02

## 2020-11-26 RX ADMIN — DOCUSATE SODIUM 100 MG: 100 CAPSULE, LIQUID FILLED ORAL at 08:03

## 2020-11-26 RX ADMIN — ASPIRIN 81 MG: 81 TABLET, FILM COATED ORAL at 08:02

## 2020-11-26 RX ADMIN — CEFTRIAXONE SODIUM 1 G: 1 INJECTION, POWDER, FOR SOLUTION INTRAMUSCULAR; INTRAVENOUS at 08:01

## 2020-11-26 RX ADMIN — SODIUM CHLORIDE, PRESERVATIVE FREE 10 ML: 5 INJECTION INTRAVENOUS at 08:01

## 2020-11-26 RX ADMIN — PREDNISONE 10 MG: 5 TABLET ORAL at 08:02

## 2020-11-26 RX ADMIN — LIDOCAINE 1 PATCH: 50 PATCH CUTANEOUS at 01:37

## 2020-11-26 RX ADMIN — CARVEDILOL 12.5 MG: 12.5 TABLET, FILM COATED ORAL at 08:03

## 2020-11-26 NOTE — OUTREACH NOTE
Prep Survey      Responses   [facility] patient discharged from?  [place]   Is LACE score < 7 ?  Yes   Eligibility  [facility]   Date of Admission  11/24/20   Date of Discharge  11/26/20   Discharge Disposition  Home or Self Care   Discharge diagnosis  Acute UTI Multiple falls    Does the patient have one of the following disease processes/diagnoses(primary or secondary)?  Other   Does the patient have Home health ordered?  No   Is there a DME ordered?  No   Prep survey completed?  Yes          Yesi Izaguirre RN

## 2020-11-30 ENCOUNTER — TRANSITIONAL CARE MANAGEMENT TELEPHONE ENCOUNTER (OUTPATIENT)
Dept: CALL CENTER | Facility: HOSPITAL | Age: 85
End: 2020-11-30

## 2020-11-30 LAB
QT INTERVAL: 358 MS
QTC INTERVAL: 392 MS

## 2020-11-30 NOTE — OUTREACH NOTE
Call Center TCM Note      Responses   [facility] patient discharged from?  [place]   Does the patient have one of the following disease processes/diagnoses(primary or secondary)?  Other   TCM attempt successful?  Yes   Call start time  1414   Call end time  1416   Discharge diagnosis  Acute UTI,  Multiple falls    Does the patient have all medications ordered at discharge?  Yes   Is the patient taking all medications as directed (includes completed medication regime)?  Yes   Does the patient have a primary care provider?   Yes   Does the patient have an appointment with their PCP within 7 days of discharge?  N/A   Comments regarding PCP  wife will call to make f/u appts.   Has the patient kept scheduled appointments due by today?  N/A   Psychosocial issues?  No   Did the patient receive a copy of their discharge instructions?  Yes   What is the patient's perception of their health status since discharge?  Improving   Is the patient/caregiver able to teach back the hierarchy of who to call/visit for symptoms/problems? PCP, Specialist, Home health nurse, Urgent Care, ED, 911  Yes   TCM call completed?  Yes   Wrap up additional comments  Patient states he is doing well, no qeustions or concerns at this time.          Sharona Barron RN    11/30/2020, 14:16 EST

## 2020-12-02 ENCOUNTER — TELEPHONE (OUTPATIENT)
Dept: INTERNAL MEDICINE | Facility: CLINIC | Age: 85
End: 2020-12-02

## 2020-12-02 RX ORDER — ARIPIPRAZOLE 2 MG/1
2 TABLET ORAL DAILY
Qty: 30 TABLET | Refills: 5 | Status: SHIPPED | OUTPATIENT
Start: 2020-12-02 | End: 2021-05-24

## 2020-12-02 RX ORDER — CEFDINIR 300 MG/1
300 CAPSULE ORAL 2 TIMES DAILY
Qty: 14 CAPSULE | Refills: 0 | Status: CANCELLED | OUTPATIENT
Start: 2020-12-02 | End: 2020-12-09

## 2020-12-02 NOTE — TELEPHONE ENCOUNTER
Patient requested a call back from Dr. Macias. Patient stated this is reference to a referral that was placed for him but was uncomfortable providing any further information.    Please call and advise. Patient call back [phone]    Under a lot of stress, daughter passed over Drug OD, he just got out of hospital.  Will add abilify for depression.

## 2020-12-10 ENCOUNTER — OFFICE VISIT (OUTPATIENT)
Dept: UROLOGY | Facility: CLINIC | Age: 85
End: 2020-12-10

## 2020-12-10 DIAGNOSIS — R39.9 LOWER URINARY TRACT SYMPTOMS (LUTS): Primary | ICD-10-CM

## 2020-12-10 PROCEDURE — 99024 POSTOP FOLLOW-UP VISIT: CPT | Performed by: UROLOGY

## 2020-12-10 NOTE — PROGRESS NOTES
Chief Complaint  LUTS    HPI  Mr. Zimmer is a 85 y.o. male with history of HTN who presents with LUTS.    He presents for telephone follow up after TURP.  He says he is doing well.  His stream strength is increased.  He no longer has any blood in his urine.  He does not have any dysuria.  He was recently hospitalized at [facility] for a fall.  Urine was questionable for infection, but culture was negative.  He still has significant urgency and urge incontinence, which were present preoperatively.    For historical review,  Previous treatments include: dutasteride, tamsulosin  Formerly seen by Dr. Luna and was scheduled for TURP  He has had 3 negative prostate biopsies - last done at  possibly  no Constipation  no Diarrhea  no History of kidney stones  IPSS Questionnaire (AUA-7):  Over the past month…    1)  Incomplete Emptying  How often have you had a sensation of not emptying your bladder?  1 - Less than 1 time in 5   2)  Frequency  How often have you had to urinate less than every two hours? 1 - Less than 1 time in 5   3)  Intermittency  How often have you found you stopped and started again several times when you urinated?  1 - Less than 1 time in 5   4) Urgency  How often have you found it difficult to postpone urination?  4 - More than half the time   5) Weak Stream  How often have you had a weak urinary stream?  4 - More than half the time   6) Straining  How often have you had to push or strain to begin urination?  1 - Less than 1 time in 5   7) Nocturia  How many times did you typically get up at night to urinate?  2 - 2 times   Total Score:  14       Quality of life due to urinary symptoms:  If you were to spend the rest of your life with your urinary condition the way it is now, how would you feel about that? 3-Mixed   Urine Leakage (Incontinence) + Leakage       Past Medical History  Past Medical History:   Diagnosis Date   • Arthritis    • Colon polyps    • COPD (chronic  obstructive pulmonary disease) (CMS/HCC)    • Ecchymosis    • Elevated cholesterol    • Heart attack (CMS/HCC)    • Hypertension    • Kidney stones    • Wears glasses        Past Surgical History  Past Surgical History:   Procedure Laterality Date   • CARDIAC CATHETERIZATION  2010    cardiac stent times one, patient states that 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   • COLONOSCOPY     • CYSTOSCOPY TRANSURETHRAL RESECTION OF PROSTATE N/A 10/27/2020    Procedure: TRANSURETHRAL RESECTION OF PROSTATE;  Surgeon: Gabriel Echevarria MD;  Location: [facility];  Service: Urology;  Laterality: N/A;   • ROTATOR CUFF REPAIR Right 2008   • TONSILLECTOMY         Medications    Current Outpatient Medications:   •  aclidinium bromide (Tudorza Pressair) 400 MCG/ACT aerosol powder  powder for inhalation, Inhale 1 puff 2 (Two) Times a Day., Disp: 1 each, Rfl: 11  •  amLODIPine (NORVASC) 2.5 MG tablet, TAKE 1 TABLET BY MOUTH EVERY NIGHT, Disp: 90 tablet, Rfl: 3  •  ARIPiprazole (Abilify) 2 MG tablet, Take 1 tablet by mouth Daily., Disp: 30 tablet, Rfl: 5  •  aspirin 81 MG tablet, Take 81 mg by mouth Daily., Disp: , Rfl:   •  carvedilol (COREG) 12.5 MG tablet, Take 12.5 mg by mouth 2 (Two) Times a Day., Disp: , Rfl: 0  •  clonazePAM (KlonoPIN) 0.5 MG tablet, TAKE 1 TO 2 TABLETS BY MOUTH EVERY NIGHT AT BEDTIME AS NEEDED, Disp: 60 tablet, Rfl: 2  •  diclofenac (VOLTAREN) 1 % gel gel, APPLY GEL TOPICALLY TO APPROPRIATE AREA AS DIRECTED, Disp: 100 g, Rfl: 2  •  docusate sodium (Colace) 100 MG capsule, Take 1 capsule by mouth 2 (Two) Times a Day. If taking percocet, Disp: 15 capsule, Rfl: 1  •  DULoxetine (CYMBALTA) 60 MG capsule, TAKE 1 CAPSULE BY MOUTH EVERY DAY, Disp: 90 capsule, Rfl: 1  •  ezetimibe (Zetia) 10 MG tablet, Take 1 tablet by mouth Daily. Continue crestor, Disp: 90 tablet, Rfl: 3  •  paricalcitol (ZEMPLAR) 1 MCG capsule, Monday, Wednesday and Friday, Disp: , Rfl: 0  •  predniSONE (DELTASONE) 10  MG tablet, Take 1 tablet by mouth Daily With Breakfast., Disp: 15 tablet, Rfl: 0  •  rosuvastatin (CRESTOR) 20 MG tablet, Take 20 mg by mouth Every Night., Disp: , Rfl:   •  sulfaSALAzine (AZULFIDINE) 500 MG tablet, Take 500 mg by mouth 2 (Two) Times a Day., Disp: , Rfl: 0  •  zolpidem CR (AMBIEN CR) 12.5 MG CR tablet, Take 12.5 mg by mouth Daily., Disp: , Rfl: 0    Allergies  Allergies   Allergen Reactions   • Quinine Derivatives Itching   • Penicillins Rash     Tolerates ceftriaxone       Social History  Social History     Socioeconomic History   • Marital status:      Spouse name: Not on file   • Number of children: Not on file   • Years of education: Not on file   • Highest education level: Not on file   Tobacco Use   • Smoking status: Former Smoker     Types: Cigarettes     Quit date: 2010     Years since quitting: 10.9   • Smokeless tobacco: Never Used   Substance and Sexual Activity   • Alcohol use: Never     Frequency: Never   • Drug use: Never   • Sexual activity: Defer       Family History  He has no family history of prostate cancer    Review of Systems  Constitutional: No fevers or chills  Skin: Negative for rash  Endocrine: No heat/cold intolerance   Cardiovascular: Negative for chest pain or dyspnea on exertion  Respiratory: Negative for shortness of breath or wheezing  Gastrointestinal: No nausea or vomiting  Genitourinary: Negative for current gross hematuria.  Musculoskeletal: No flank pain  Neurological:  Negative for frequent headaches or dizziness  Lymph/Heme: Negative for leg swelling or calf pain.    Physical Exam  There were no vitals taken for this visit.      Labs  No results found for: PSA    Brief Urine Lab Results  (Last result in the past 365 days)      Color   Clarity   Blood   Leuk Est   Nitrite   Protein   CREAT   Urine HCG        11/24/20 0927 Yellow Cloudy Moderate (2+) Large (3+) Negative 30 mg/dL (1+)                   Assessment  Mr. Zimmer is a 85 y.o. male who  presents with LUTS, primarily weak stream and urgency. Urgency has improved now off medicine, but still has very weak stream. 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 He is s/p TURP 10/27/20. Doing well, increased stream strength.  He still has some urge incontinence.  He denies any pain or fevers.  Gross hematuria has resolved.  He was recently hospitalized at [facility] due to a fall.  There was some concern for urinary tract infection, but culture was ultimately negative.    I reassured him that the urgency and frequency would likely improve but they would take longer than the stream strength.  If these persist beyond 6 months, we could discuss treatments for overactive bladder, which it is not uncommon that we have to treat this separately after TURP, as sometimes when the bladder is decompensated these do not resolve.    Plan  1.    FU in 3 months    This visit has been rescheduled as a phone visit to comply with patient safety concerns in accordance with CDC recommendations. Total time of discussion was 21 minutes.  Informed consent was obtained.      Gabriel Echevarria MD

## 2020-12-28 ENCOUNTER — TELEPHONE (OUTPATIENT)
Dept: INTERNAL MEDICINE | Facility: CLINIC | Age: 85
End: 2020-12-28

## 2020-12-28 DIAGNOSIS — I73.9 PVD (PERIPHERAL VASCULAR DISEASE) (HCC): Primary | ICD-10-CM

## 2020-12-28 NOTE — TELEPHONE ENCOUNTER
PATIENT CALLED WANTING TO KNOW WHO DR HAWTHORNE WILL RECOMMEND FOR VASCULAR SURGERY? PLEASE GIVE PT A CALL

## 2020-12-28 NOTE — TELEPHONE ENCOUNTER
4    Pharmacy Name: [facility] [place], [place] - [address]  - [phone]  - [phone]      Pharmacy representative name: JOCE     Pharmacy representative phone number: [phone]  What medication are you calling in regards to: diclofenac (VOLTAREN) 1 % gel gel    What question does the pharmacy have: HOW MANY GRAMS    Who is the provider that prescribed the medication: DR HAWTHORNE    Additional notes:

## 2020-12-29 NOTE — TELEPHONE ENCOUNTER
PATIENT CALLED AND STATED HE NO LONGER NEEDS APPOINTMENT TO VASCULAR SURGERY.  HIS PREVIOUS PHYSICIAN IS STILL ALIVE AND PRACTICING.

## 2021-02-12 ENCOUNTER — TELEPHONE (OUTPATIENT)
Dept: INTERNAL MEDICINE | Facility: CLINIC | Age: 86
End: 2021-02-12

## 2021-02-12 ENCOUNTER — OFFICE VISIT (OUTPATIENT)
Dept: INTERNAL MEDICINE | Facility: CLINIC | Age: 86
End: 2021-02-12

## 2021-02-12 VITALS
SYSTOLIC BLOOD PRESSURE: 140 MMHG | TEMPERATURE: 97.3 F | HEART RATE: 72 BPM | WEIGHT: 171 LBS | HEIGHT: 69 IN | DIASTOLIC BLOOD PRESSURE: 70 MMHG | BODY MASS INDEX: 25.33 KG/M2

## 2021-02-12 DIAGNOSIS — I10 ESSENTIAL HYPERTENSION: Primary | ICD-10-CM

## 2021-02-12 DIAGNOSIS — I73.9 PVD (PERIPHERAL VASCULAR DISEASE) (HCC): ICD-10-CM

## 2021-02-12 DIAGNOSIS — N18.30 STAGE 3 CHRONIC KIDNEY DISEASE, UNSPECIFIED WHETHER STAGE 3A OR 3B CKD (HCC): ICD-10-CM

## 2021-02-12 DIAGNOSIS — K57.30 DIVERTICULOSIS OF LARGE INTESTINE WITHOUT HEMORRHAGE: ICD-10-CM

## 2021-02-12 DIAGNOSIS — N40.0 ENLARGED PROSTATE WITHOUT LOWER URINARY TRACT SYMPTOMS (LUTS): ICD-10-CM

## 2021-02-12 DIAGNOSIS — F41.9 ANXIETY: ICD-10-CM

## 2021-02-12 DIAGNOSIS — E78.49 OTHER HYPERLIPIDEMIA: ICD-10-CM

## 2021-02-12 DIAGNOSIS — G47.00 INSOMNIA, UNSPECIFIED TYPE: ICD-10-CM

## 2021-02-12 DIAGNOSIS — M15.9 GENERALIZED OSTEOARTHRITIS: ICD-10-CM

## 2021-02-12 DIAGNOSIS — I25.10 ATHEROSCLEROSIS OF NATIVE CORONARY ARTERY OF NATIVE HEART WITHOUT ANGINA PECTORIS: ICD-10-CM

## 2021-02-12 DIAGNOSIS — D36.9 TUBULAR ADENOMA: ICD-10-CM

## 2021-02-12 DIAGNOSIS — J43.8 OTHER EMPHYSEMA (HCC): ICD-10-CM

## 2021-02-12 PROBLEM — R10.9 LEFT SIDED ABDOMINAL PAIN: Status: RESOLVED | Noted: 2020-11-26 | Resolved: 2021-02-12

## 2021-02-12 PROCEDURE — 99214 OFFICE O/P EST MOD 30 MIN: CPT | Performed by: INTERNAL MEDICINE

## 2021-02-12 NOTE — PROGRESS NOTES
Patient is a 85 y.o. male who is here for a follow up of hyperlipidemia and hypertension.  Chief Complaint   Patient presents with   • Hyperlipidemia   • Hypertension         HPI:    Here for mgmt of HTN and COPD and hyperlipidemia.  Doing good overall.  Still has arthritic issues.  No dizziness or lightheadedness.  No cough.  Energy level is fair.  Some improvement in urination with TURP but still dribbling.  No abdominal pains.  No fever or chills.     History:     Patient Active Problem List   Diagnosis   • Anxiety   • Aortic aneurysm (CMS/HCC)   • Atherosclerotic heart disease of native coronary artery without angina pectoris   • Chronic obstructive pulmonary disease (CMS/HCC)   • Chronic kidney disease, stage III (moderate) (CMS/HCC)   • Diverticulosis of large intestine   • Enlarged prostate without lower urinary tract symptoms (luts)   • Generalized osteoarthritis   • Hyperlipidemia   • Hypertension   • Insomnia   • Neuropathy   • PVD (peripheral vascular disease) (CMS/HCC)   • Tubular adenoma   • Absence of kidney   • Arthralgia of hip   • Lower urinary tract symptoms (LUTS)   • Routine general medical examination at a health care facility   • Multiple falls   • Acute UTI (urinary tract infection)       Past Medical History:   Diagnosis Date   • Arthritis    • Colon polyps    • COPD (chronic obstructive pulmonary disease) (CMS/HCC)    • Ecchymosis    • Elevated cholesterol    • Heart attack (CMS/HCC)    • Hypertension    • Kidney stones    • Wears glasses        Past Surgical History:   Procedure Laterality Date   • CARDIAC CATHETERIZATION  2010    cardiac stent times one, patient states that 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   • COLONOSCOPY     • CYSTOSCOPY TRANSURETHRAL RESECTION OF PROSTATE N/A 10/27/2020    Procedure: TRANSURETHRAL RESECTION OF PROSTATE;  Surgeon: Gabriel Echevarria MD;  Location: [facility];  Service: Urology;  Laterality: N/A;   • ROTATOR CUFF REPAIR  Right 2008   • TONSILLECTOMY         Current Outpatient Medications on File Prior to Visit   Medication Sig   • aclidinium bromide (Tudorza Pressair) 400 MCG/ACT aerosol powder  powder for inhalation Inhale 1 puff 2 (Two) Times a Day.   • amLODIPine (NORVASC) 2.5 MG tablet TAKE 1 TABLET BY MOUTH EVERY NIGHT   • ARIPiprazole (Abilify) 2 MG tablet Take 1 tablet by mouth Daily.   • aspirin 81 MG tablet Take 81 mg by mouth Daily.   • carvedilol (COREG) 12.5 MG tablet Take 12.5 mg by mouth 2 (Two) Times a Day.   • clonazePAM (KlonoPIN) 0.5 MG tablet TAKE 1 TO 2 TABLETS BY MOUTH EVERY NIGHT AT BEDTIME AS NEEDED   • diclofenac (VOLTAREN) 1 % gel gel APPLY GEL TOPICALLY TO APPROPRIATE AREA AS DIRECTED   • Diclofenac Sodium (VOLTAREN) 1 % gel gel APPLY TO AFFECTED AREA 2 TIMES A DAY   • docusate sodium (Colace) 100 MG capsule Take 1 capsule by mouth 2 (Two) Times a Day. If taking percocet   • DULoxetine (CYMBALTA) 60 MG capsule TAKE 1 CAPSULE BY MOUTH EVERY DAY   • ezetimibe (Zetia) 10 MG tablet Take 1 tablet by mouth Daily. Continue crestor   • paricalcitol (ZEMPLAR) 1 MCG capsule Monday, Wednesday and Friday   • predniSONE (DELTASONE) 10 MG tablet Take 1 tablet by mouth Daily With Breakfast.   • rosuvastatin (CRESTOR) 20 MG tablet Take 20 mg by mouth Every Night.   • sulfaSALAzine (AZULFIDINE) 500 MG tablet Take 500 mg by mouth 2 (Two) Times a Day.   • zolpidem CR (AMBIEN CR) 12.5 MG CR tablet Take 12.5 mg by mouth Daily.     No current facility-administered medications on file prior to visit.        Family History   Problem Relation Age of Onset   • Cancer Other    • Heart attack Other    • Hypertension Other    • Hyperlipidemia Other        Social History     Socioeconomic History   • Marital status:      Spouse name: Not on file   • Number of children: Not on file   • Years of education: Not on file   • Highest education level: Not on file   Tobacco Use   • Smoking status: Former Smoker     Types: Cigarettes      "Quit date:      Years since quittin.1   • Smokeless tobacco: Never Used   Substance and Sexual Activity   • Alcohol use: Never     Frequency: Never   • Drug use: Never   • Sexual activity: Defer         Review of Systems   Constitutional: Negative for chills, diaphoresis, fever and unexpected weight change.   HENT: Negative for congestion, ear pain, hearing loss, nosebleeds, postnasal drip, sinus pressure and sore throat.    Eyes: Negative for pain, discharge and itching.   Respiratory: Positive for shortness of breath (improved). Negative for cough, chest tightness and wheezing.    Cardiovascular: Negative for chest pain, palpitations and leg swelling.   Gastrointestinal: Negative for abdominal distention, abdominal pain, blood in stool, constipation, diarrhea, nausea and vomiting.        5/15 colonoscopy with TA   \" \"   Endocrine: Negative for polydipsia and polyuria.   Genitourinary: Negative for difficulty urinating, dysuria, frequency and hematuria.        Followed by urology   Musculoskeletal: Positive for arthralgias (in hips) and back pain. Negative for gait problem, joint swelling and myalgias.   Skin: Negative for rash and wound.   Neurological: Negative for dizziness, syncope and headaches.   Psychiatric/Behavioral: Positive for sleep disturbance. Negative for dysphoric mood. The patient is not nervous/anxious.        /70   Pulse 72   Temp 97.3 °F (36.3 °C) (Infrared)   Ht 175.3 cm (69.02\")   Wt 77.6 kg (171 lb)   BMI 25.24 kg/m²       Physical Exam  Constitutional:       Appearance: Normal appearance. He is well-developed.   HENT:      Head: Normocephalic and atraumatic.      Right Ear: External ear normal.      Left Ear: External ear normal.      Nose: Nose normal.      Mouth/Throat:      Mouth: Mucous membranes are moist.      Pharynx: Oropharynx is clear.   Eyes:      Extraocular Movements: Extraocular movements intact.      Conjunctiva/sclera: Conjunctivae normal.      Pupils: " Pupils are equal, round, and reactive to light.   Neck:      Musculoskeletal: Normal range of motion and neck supple.   Cardiovascular:      Rate and Rhythm: Normal rate and regular rhythm.      Heart sounds: Normal heart sounds.   Pulmonary:      Effort: Pulmonary effort is normal.      Comments: Diminished BS  Abdominal:      General: Bowel sounds are normal.      Palpations: Abdomen is soft.   Musculoskeletal: Normal range of motion.   Lymphadenopathy:      Cervical: No cervical adenopathy.   Skin:     General: Skin is warm and dry.   Neurological:      General: No focal deficit present.      Mental Status: He is alert and oriented to person, place, and time.   Psychiatric:         Mood and Affect: Mood normal.         Behavior: Behavior normal.         Thought Content: Thought content normal.         Procedure:      Discussion/Summary:    htn-controlled on CCB and BB  Cri-labs on rtc, advised adequate hydration and NSAID avoidance  copd-controlled on Tudorza  hyperlipidemia-labs soon on Crestor/Zetia, counseled on diet and wt loss  bph-Urology f/u , s/p TURP  diverticulosis-citrucel qd , asymptomatic  TA-colonoscopy noted, needs scope 7/23  cad/pvd-cont rf mod, asymptomatic  insomnia-cont klonopine qhs prn, advised of risk  djd/neuropathy-cont cymbalta and gabapentin, f/u Dr Garduno, stable for the most part      10/12 labs noted and dw patient    Current Outpatient Medications:   •  aclidinium bromide (Tudorza Pressair) 400 MCG/ACT aerosol powder  powder for inhalation, Inhale 1 puff 2 (Two) Times a Day., Disp: 1 each, Rfl: 11  •  amLODIPine (NORVASC) 2.5 MG tablet, TAKE 1 TABLET BY MOUTH EVERY NIGHT, Disp: 90 tablet, Rfl: 3  •  ARIPiprazole (Abilify) 2 MG tablet, Take 1 tablet by mouth Daily., Disp: 30 tablet, Rfl: 5  •  aspirin 81 MG tablet, Take 81 mg by mouth Daily., Disp: , Rfl:   •  carvedilol (COREG) 12.5 MG tablet, Take 12.5 mg by mouth 2 (Two) Times a Day., Disp: , Rfl: 0  •  clonazePAM (KlonoPIN) 0.5 MG  tablet, TAKE 1 TO 2 TABLETS BY MOUTH EVERY NIGHT AT BEDTIME AS NEEDED, Disp: 60 tablet, Rfl: 2  •  diclofenac (VOLTAREN) 1 % gel gel, APPLY GEL TOPICALLY TO APPROPRIATE AREA AS DIRECTED, Disp: 100 g, Rfl: 2  •  Diclofenac Sodium (VOLTAREN) 1 % gel gel, APPLY TO AFFECTED AREA 2 TIMES A DAY, Disp: 100 g, Rfl: 5  •  docusate sodium (Colace) 100 MG capsule, Take 1 capsule by mouth 2 (Two) Times a Day. If taking percocet, Disp: 15 capsule, Rfl: 1  •  DULoxetine (CYMBALTA) 60 MG capsule, TAKE 1 CAPSULE BY MOUTH EVERY DAY, Disp: 90 capsule, Rfl: 1  •  ezetimibe (Zetia) 10 MG tablet, Take 1 tablet by mouth Daily. Continue crestor, Disp: 90 tablet, Rfl: 3  •  paricalcitol (ZEMPLAR) 1 MCG capsule, Monday, Wednesday and Friday, Disp: , Rfl: 0  •  predniSONE (DELTASONE) 10 MG tablet, Take 1 tablet by mouth Daily With Breakfast., Disp: 15 tablet, Rfl: 0  •  rosuvastatin (CRESTOR) 20 MG tablet, Take 20 mg by mouth Every Night., Disp: , Rfl:   •  sulfaSALAzine (AZULFIDINE) 500 MG tablet, Take 500 mg by mouth 2 (Two) Times a Day., Disp: , Rfl: 0  •  zolpidem CR (AMBIEN CR) 12.5 MG CR tablet, Take 12.5 mg by mouth Daily., Disp: , Rfl: 0        Diagnoses and all orders for this visit:    1. Essential hypertension (Primary)    2. PVD (peripheral vascular disease) (CMS/HCC)    3. Other hyperlipidemia    4. Atherosclerosis of native coronary artery of native heart without angina pectoris    5. Diverticulosis of large intestine without hemorrhage    6. Enlarged prostate without lower urinary tract symptoms (luts)    7. Stage 3 chronic kidney disease, unspecified whether stage 3a or 3b CKD (CMS/HCC)    8. Tubular adenoma    9. Anxiety    10. Generalized osteoarthritis    11. Insomnia, unspecified type    12. Other emphysema (CMS/HCC)

## 2021-03-01 RX ORDER — DULOXETIN HYDROCHLORIDE 60 MG/1
CAPSULE, DELAYED RELEASE ORAL
Qty: 90 CAPSULE | Refills: 3 | Status: SHIPPED | OUTPATIENT
Start: 2021-03-01 | End: 2022-01-01

## 2021-03-08 ENCOUNTER — OFFICE VISIT (OUTPATIENT)
Dept: UROLOGY | Age: 86
End: 2021-03-08

## 2021-03-08 VITALS
SYSTOLIC BLOOD PRESSURE: 122 MMHG | BODY MASS INDEX: 25.33 KG/M2 | OXYGEN SATURATION: 98 % | TEMPERATURE: 98.4 F | HEART RATE: 86 BPM | DIASTOLIC BLOOD PRESSURE: 70 MMHG | HEIGHT: 69 IN | WEIGHT: 171 LBS | RESPIRATION RATE: 17 BRPM

## 2021-03-08 DIAGNOSIS — R39.9 LOWER URINARY TRACT SYMPTOMS (LUTS): ICD-10-CM

## 2021-03-08 PROCEDURE — 99213 OFFICE O/P EST LOW 20 MIN: CPT | Performed by: UROLOGY

## 2021-03-08 PROCEDURE — 51798 US URINE CAPACITY MEASURE: CPT | Performed by: UROLOGY

## 2021-03-08 NOTE — PROGRESS NOTES
Chief Complaint  LUTS    HPI  Mr. Zimmer is a 85 y.o. male with history of HTN who presents with LUTS. He is now status post TURP and presents for his 3-month follow-up. His IPSS score in clinic today is 3 and preoperatively it was 14. He reports having improvements with his urge incontinence at this time. He is still wearing pads and states the pad does not get wet but his shorts are wet, so he is unsure if he is leaking urine or just sweating.     The patient would like to start Viagra and is interested in being sexually active. He states he is not able to penetrate at this time. He is able to ascend 2 flights of stairs though he notes he is slightly winded when he reaches the 2nd floor. The patient denies any chest pain with exertion.     He has a medical history of an aortic aneurysm, cardiovascular disease, and peripheral vascular disease.         Past Medical History  Past Medical History:   Diagnosis Date   • Arthritis    • Colon polyps    • COPD (chronic obstructive pulmonary disease) (CMS/HCC)    • Ecchymosis    • Elevated cholesterol    • Heart attack (CMS/HCC)    • Hypertension    • Kidney stones    • Wears glasses        Past Surgical History  Past Surgical History:   Procedure Laterality Date   • CARDIAC CATHETERIZATION  2010    cardiac stent times one, patient states that 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   • COLONOSCOPY     • CYSTOSCOPY TRANSURETHRAL RESECTION OF PROSTATE N/A 10/27/2020    Procedure: TRANSURETHRAL RESECTION OF PROSTATE;  Surgeon: Gabriel Echevarria MD;  Location: [facility];  Service: Urology;  Laterality: N/A;   • ROTATOR CUFF REPAIR Right 2008   • TONSILLECTOMY         Medications    Current Outpatient Medications:   •  aclidinium bromide (Tudorza Pressair) 400 MCG/ACT aerosol powder  powder for inhalation, Inhale 1 puff 2 (Two) Times a Day., Disp: 1 each, Rfl: 11  •  amLODIPine (NORVASC) 2.5 MG tablet, TAKE 1 TABLET BY MOUTH EVERY NIGHT, Disp:  90 tablet, Rfl: 3  •  ARIPiprazole (Abilify) 2 MG tablet, Take 1 tablet by mouth Daily., Disp: 30 tablet, Rfl: 5  •  aspirin 81 MG tablet, Take 81 mg by mouth Daily., Disp: , Rfl:   •  carvedilol (COREG) 12.5 MG tablet, Take 12.5 mg by mouth 2 (Two) Times a Day., Disp: , Rfl: 0  •  clonazePAM (KlonoPIN) 0.5 MG tablet, TAKE 1 TO 2 TABLETS BY MOUTH EVERY NIGHT AT BEDTIME AS NEEDED, Disp: 60 tablet, Rfl: 2  •  diclofenac (VOLTAREN) 1 % gel gel, APPLY GEL TOPICALLY TO APPROPRIATE AREA AS DIRECTED, Disp: 100 g, Rfl: 2  •  Diclofenac Sodium (VOLTAREN) 1 % gel gel, APPLY TO AFFECTED AREA 2 TIMES A DAY, Disp: 100 g, Rfl: 5  •  docusate sodium (Colace) 100 MG capsule, Take 1 capsule by mouth 2 (Two) Times a Day. If taking percocet, Disp: 15 capsule, Rfl: 1  •  DULoxetine (CYMBALTA) 60 MG capsule, TAKE ONE CAPSULE BY MOUTH EVERY DAY, Disp: 90 capsule, Rfl: 3  •  ezetimibe (Zetia) 10 MG tablet, Take 1 tablet by mouth Daily. Continue crestor, Disp: 90 tablet, Rfl: 3  •  paricalcitol (ZEMPLAR) 1 MCG capsule, Monday, Wednesday and Friday, Disp: , Rfl: 0  •  predniSONE (DELTASONE) 10 MG tablet, Take 1 tablet by mouth Daily With Breakfast., Disp: 15 tablet, Rfl: 0  •  rosuvastatin (CRESTOR) 20 MG tablet, Take 20 mg by mouth Every Night., Disp: , Rfl:   •  sulfaSALAzine (AZULFIDINE) 500 MG tablet, Take 500 mg by mouth 2 (Two) Times a Day., Disp: , Rfl: 0  •  zolpidem CR (AMBIEN CR) 12.5 MG CR tablet, Take 12.5 mg by mouth Daily., Disp: , Rfl: 0    Allergies  Allergies   Allergen Reactions   • Quinine Derivatives Itching   • Penicillins Rash     Tolerates ceftriaxone       Social History  Social History     Socioeconomic History   • Marital status:      Spouse name: Not on file   • Number of children: Not on file   • Years of education: Not on file   • Highest education level: Not on file   Tobacco Use   • Smoking status: Former Smoker     Types: Cigarettes     Quit date:      Years since quittin.1   • Smokeless  "tobacco: Never Used   Vaping Use   • Vaping Use: Never used   Substance and Sexual Activity   • Alcohol use: Never   • Drug use: Never   • Sexual activity: Defer       Family History  He has no family history of prostate cancer    Review of Systems  Constitutional: No fevers or chills  Skin: Negative for rash  Endocrine: No heat/cold intolerance   Cardiovascular: Negative for chest pain or dyspnea on exertion  Respiratory: Negative for shortness of breath or wheezing  Gastrointestinal: No nausea or vomiting  Genitourinary: Negative for current gross hematuria.  Musculoskeletal: No flank pain  Neurological:  Negative for frequent headaches or dizziness  Lymph/Heme: Negative for leg swelling or calf pain.    Physical Exam  Visit Vitals  /70   Pulse 86   Temp 98.4 °F (36.9 °C)   Resp 17   Ht 175.3 cm (69.02\")   Wt 77.6 kg (171 lb)   SpO2 98%   BMI 25.24 kg/m²         Labs  No results found for: PSA    Brief Urine Lab Results  (Last result in the past 365 days)      Color   Clarity   Blood   Leuk Est   Nitrite   Protein   CREAT   Urine HCG        11/24/20 0927 Yellow Cloudy Moderate (2+) Large (3+) Negative 30 mg/dL (1+)             PVR  Post-void residual performed with ultrasound scanner by staff and interpreted by me - 18 mL.        Assessment  Mr. Zimmer is a 85 y.o. male who presents with LUTS, primarily weak stream and urgency. He is status post TURP on 10/27/2020. He continued to have urgency and frequency postoperatively, which I had reassured him would improve but took some time. His IPSS is now 2 today from 14 preoperatively. He is overall doing very well. His stream strength is improved and urgency has improved. He is not actually leaking, just sweating, he states.     He also complains of erectile dysfunction and is currently not able to penetrate and desires to be able to do so. He is able to walk up 2 flights of stairs and has no chest pain. I, therefore, offered him sildenafil, but he did not " want a prescription at this time, even though he inquired about it.    Plan  1. Follow up in one year with IPSS and PVR.   2. I will prescribe him sildenafil if he desires.  3. I have recommended he use [employer] powder in his genital region.    Scribed for Gabriel Echevarria MD by Melissa Fuller.  3/8/2021  19:27 EST    I Gabriel Echevarria MD have personally performed the services described in this document as scribed by the above individual, and it is both accurate and complete.     Gabriel Echevarria MD  3/9/2021  21:26 EST

## 2021-03-25 ENCOUNTER — OFFICE VISIT (OUTPATIENT)
Dept: INTERNAL MEDICINE | Facility: CLINIC | Age: 86
End: 2021-03-25

## 2021-03-25 VITALS
DIASTOLIC BLOOD PRESSURE: 70 MMHG | TEMPERATURE: 96.9 F | BODY MASS INDEX: 26.36 KG/M2 | SYSTOLIC BLOOD PRESSURE: 130 MMHG | HEIGHT: 69 IN | WEIGHT: 178 LBS | HEART RATE: 68 BPM

## 2021-03-25 DIAGNOSIS — I25.10 ATHEROSCLEROSIS OF NATIVE CORONARY ARTERY OF NATIVE HEART WITHOUT ANGINA PECTORIS: ICD-10-CM

## 2021-03-25 DIAGNOSIS — E78.49 OTHER HYPERLIPIDEMIA: ICD-10-CM

## 2021-03-25 DIAGNOSIS — D36.9 TUBULAR ADENOMA: ICD-10-CM

## 2021-03-25 DIAGNOSIS — I10 ESSENTIAL HYPERTENSION: Primary | ICD-10-CM

## 2021-03-25 DIAGNOSIS — N40.0 ENLARGED PROSTATE WITHOUT LOWER URINARY TRACT SYMPTOMS (LUTS): ICD-10-CM

## 2021-03-25 DIAGNOSIS — N18.30 STAGE 3 CHRONIC KIDNEY DISEASE, UNSPECIFIED WHETHER STAGE 3A OR 3B CKD (HCC): ICD-10-CM

## 2021-03-25 DIAGNOSIS — J43.8 OTHER EMPHYSEMA (HCC): ICD-10-CM

## 2021-03-25 DIAGNOSIS — K57.30 DIVERTICULOSIS OF LARGE INTESTINE WITHOUT HEMORRHAGE: ICD-10-CM

## 2021-03-25 DIAGNOSIS — I73.9 PVD (PERIPHERAL VASCULAR DISEASE) (HCC): ICD-10-CM

## 2021-03-25 DIAGNOSIS — G47.00 INSOMNIA, UNSPECIFIED TYPE: ICD-10-CM

## 2021-03-25 PROCEDURE — 99214 OFFICE O/P EST MOD 30 MIN: CPT | Performed by: INTERNAL MEDICINE

## 2021-03-25 NOTE — PROGRESS NOTES
Patient is a 85 y.o. male who is here for SOB.  Chief Complaint   Patient presents with   • Shortness of Breath         HPI:    Here for evaluation of worsening SOB.  Onset weeks.  With least bit exertion has YU.  Seen by pharmacist today and felt to be in need of evaluation.  Compliant with Tudorza.  No increase in cough.  No hemoptysis.  No fever or chills.  Improved with rest.  No CP.      History:     Patient Active Problem List   Diagnosis   • Anxiety   • Aortic aneurysm (CMS/HCC)   • Atherosclerotic heart disease of native coronary artery without angina pectoris   • Chronic obstructive pulmonary disease (CMS/HCC)   • Chronic kidney disease, stage III (moderate) (CMS/HCC)   • Diverticulosis of large intestine   • Enlarged prostate without lower urinary tract symptoms (luts)   • Generalized osteoarthritis   • Hyperlipidemia   • Hypertension   • Insomnia   • Neuropathy   • PVD (peripheral vascular disease) (CMS/HCC)   • Tubular adenoma   • Absence of kidney   • Arthralgia of hip   • Lower urinary tract symptoms (LUTS)   • Routine general medical examination at a health care facility   • Multiple falls   • Acute UTI (urinary tract infection)       Past Medical History:   Diagnosis Date   • Arthritis    • Colon polyps    • COPD (chronic obstructive pulmonary disease) (CMS/HCC)    • Ecchymosis    • Elevated cholesterol    • Heart attack (CMS/HCC)    • Hypertension    • Kidney stones    • Wears glasses        Past Surgical History:   Procedure Laterality Date   • CARDIAC CATHETERIZATION  2010    cardiac stent times one, patient states that 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   • COLONOSCOPY     • CYSTOSCOPY TRANSURETHRAL RESECTION OF PROSTATE N/A 10/27/2020    Procedure: TRANSURETHRAL RESECTION OF PROSTATE;  Surgeon: Gabriel Echevarria MD;  Location: [facility];  Service: Urology;  Laterality: N/A;   • ROTATOR CUFF REPAIR Right 2008   • TONSILLECTOMY         Current Outpatient  Medications on File Prior to Visit   Medication Sig   • amLODIPine (NORVASC) 2.5 MG tablet TAKE 1 TABLET BY MOUTH EVERY NIGHT   • ARIPiprazole (Abilify) 2 MG tablet Take 1 tablet by mouth Daily.   • aspirin 81 MG tablet Take 81 mg by mouth Daily.   • carvedilol (COREG) 12.5 MG tablet Take 12.5 mg by mouth 2 (Two) Times a Day.   • clonazePAM (KlonoPIN) 0.5 MG tablet TAKE 1 TO 2 TABLETS BY MOUTH EVERY NIGHT AT BEDTIME AS NEEDED   • diclofenac (VOLTAREN) 1 % gel gel APPLY GEL TOPICALLY TO APPROPRIATE AREA AS DIRECTED   • Diclofenac Sodium (VOLTAREN) 1 % gel gel APPLY TO AFFECTED AREA 2 TIMES A DAY   • docusate sodium (Colace) 100 MG capsule Take 1 capsule by mouth 2 (Two) Times a Day. If taking percocet   • DULoxetine (CYMBALTA) 60 MG capsule TAKE ONE CAPSULE BY MOUTH EVERY DAY   • ezetimibe (Zetia) 10 MG tablet Take 1 tablet by mouth Daily. Continue crestor   • paricalcitol (ZEMPLAR) 1 MCG capsule Monday, Wednesday and Friday   • predniSONE (DELTASONE) 10 MG tablet Take 1 tablet by mouth Daily With Breakfast.   • rosuvastatin (CRESTOR) 20 MG tablet Take 20 mg by mouth Every Night.   • sulfaSALAzine (AZULFIDINE) 500 MG tablet Take 500 mg by mouth 2 (Two) Times a Day.   • zolpidem CR (AMBIEN CR) 12.5 MG CR tablet Take 12.5 mg by mouth Daily.   • [DISCONTINUED] aclidinium bromide (Tudorza Pressair) 400 MCG/ACT aerosol powder  powder for inhalation Inhale 1 puff 2 (Two) Times a Day.     No current facility-administered medications on file prior to visit.       Family History   Problem Relation Age of Onset   • Cancer Other    • Heart attack Other    • Hypertension Other    • Hyperlipidemia Other        Social History     Socioeconomic History   • Marital status:      Spouse name: Not on file   • Number of children: Not on file   • Years of education: Not on file   • Highest education level: Not on file   Tobacco Use   • Smoking status: Former Smoker     Types: Cigarettes     Quit date: 2010     Years since  "quittin.2   • Smokeless tobacco: Never Used   Vaping Use   • Vaping Use: Never used   Substance and Sexual Activity   • Alcohol use: Never   • Drug use: Never   • Sexual activity: Defer         Review of Systems   Constitutional: Negative for chills, diaphoresis, fever and unexpected weight change.   HENT: Negative for congestion, ear pain, hearing loss, nosebleeds, postnasal drip, sinus pressure and sore throat.    Eyes: Negative for pain, discharge and itching.   Respiratory: Positive for shortness of breath. Negative for cough, chest tightness and wheezing.    Cardiovascular: Negative for chest pain, palpitations and leg swelling.   Gastrointestinal: Negative for abdominal distention, abdominal pain, blood in stool, constipation, diarrhea, nausea and vomiting.        5/15 colonoscopy with TA   \" \"   Endocrine: Negative for polydipsia and polyuria.   Genitourinary: Negative for difficulty urinating, dysuria, frequency and hematuria.        Followed by urology   Musculoskeletal: Positive for arthralgias (in hips) and back pain. Negative for gait problem, joint swelling and myalgias.   Skin: Negative for rash and wound.   Neurological: Negative for dizziness, syncope and headaches.   Psychiatric/Behavioral: Positive for sleep disturbance. Negative for dysphoric mood. The patient is not nervous/anxious.        /70   Pulse 68   Temp 96.9 °F (36.1 °C) (Infrared)   Ht 175.3 cm (69.02\")   Wt 80.7 kg (178 lb)   BMI 26.27 kg/m²       Physical Exam  Constitutional:       Appearance: Normal appearance. He is well-developed.   HENT:      Head: Normocephalic and atraumatic.      Right Ear: External ear normal.      Left Ear: External ear normal.      Nose: Nose normal.      Mouth/Throat:      Mouth: Mucous membranes are moist.      Pharynx: Oropharynx is clear.   Eyes:      Extraocular Movements: Extraocular movements intact.      Conjunctiva/sclera: Conjunctivae normal.      Pupils: Pupils are equal, " round, and reactive to light.   Cardiovascular:      Rate and Rhythm: Normal rate and regular rhythm.      Heart sounds: Normal heart sounds.   Pulmonary:      Effort: Pulmonary effort is normal.      Comments: Diminished BS  Abdominal:      General: Bowel sounds are normal.      Palpations: Abdomen is soft.   Musculoskeletal:         General: Normal range of motion.      Cervical back: Normal range of motion and neck supple.   Lymphadenopathy:      Cervical: No cervical adenopathy.   Skin:     General: Skin is warm and dry.   Neurological:      General: No focal deficit present.      Mental Status: He is alert and oriented to person, place, and time.   Psychiatric:         Mood and Affect: Mood normal.         Behavior: Behavior normal.         Thought Content: Thought content normal.         Procedure:      Discussion/Summary:    htn-controlled on CCB and BB  Cri-labs on rtc, advised adequate hydration and NSAID avoidance  copd-change to Trelegy  hyperlipidemia-labs soon on Crestor/Zetia, counseled on diet and wt loss  bph-Urology f/u , s/p TURP  diverticulosis-citrucel qd , asymptomatic  TA-colonoscopy noted, needs scope 7/23  cad/pvd-cont rf mod, asymptomatic  insomnia-cont klonopine qhs prn, advised of risk  djd/neuropathy-cont cymbalta and gabapentin, f/u Dr Garduno, stable for the most part  Diverticulosis-counseled on increased fiber      10/12 labs noted and dw patient    Current Outpatient Medications:   •  amLODIPine (NORVASC) 2.5 MG tablet, TAKE 1 TABLET BY MOUTH EVERY NIGHT, Disp: 90 tablet, Rfl: 3  •  ARIPiprazole (Abilify) 2 MG tablet, Take 1 tablet by mouth Daily., Disp: 30 tablet, Rfl: 5  •  aspirin 81 MG tablet, Take 81 mg by mouth Daily., Disp: , Rfl:   •  carvedilol (COREG) 12.5 MG tablet, Take 12.5 mg by mouth 2 (Two) Times a Day., Disp: , Rfl: 0  •  clonazePAM (KlonoPIN) 0.5 MG tablet, TAKE 1 TO 2 TABLETS BY MOUTH EVERY NIGHT AT BEDTIME AS NEEDED, Disp: 60 tablet, Rfl: 2  •  diclofenac (VOLTAREN)  1 % gel gel, APPLY GEL TOPICALLY TO APPROPRIATE AREA AS DIRECTED, Disp: 100 g, Rfl: 2  •  Diclofenac Sodium (VOLTAREN) 1 % gel gel, APPLY TO AFFECTED AREA 2 TIMES A DAY, Disp: 100 g, Rfl: 5  •  docusate sodium (Colace) 100 MG capsule, Take 1 capsule by mouth 2 (Two) Times a Day. If taking percocet, Disp: 15 capsule, Rfl: 1  •  DULoxetine (CYMBALTA) 60 MG capsule, TAKE ONE CAPSULE BY MOUTH EVERY DAY, Disp: 90 capsule, Rfl: 3  •  ezetimibe (Zetia) 10 MG tablet, Take 1 tablet by mouth Daily. Continue crestor, Disp: 90 tablet, Rfl: 3  •  paricalcitol (ZEMPLAR) 1 MCG capsule, Monday, Wednesday and Friday, Disp: , Rfl: 0  •  predniSONE (DELTASONE) 10 MG tablet, Take 1 tablet by mouth Daily With Breakfast., Disp: 15 tablet, Rfl: 0  •  rosuvastatin (CRESTOR) 20 MG tablet, Take 20 mg by mouth Every Night., Disp: , Rfl:   •  sulfaSALAzine (AZULFIDINE) 500 MG tablet, Take 500 mg by mouth 2 (Two) Times a Day., Disp: , Rfl: 0  •  zolpidem CR (AMBIEN CR) 12.5 MG CR tablet, Take 12.5 mg by mouth Daily., Disp: , Rfl: 0  •  Fluticasone-Umeclidin-Vilant (Trelegy Ellipta) 200-62.5-25 MCG/INH aerosol powder , Inhale 1 puff Every Morning., Disp: 1 each, Rfl: 5        Diagnoses and all orders for this visit:    1. Essential hypertension (Primary)    2. Other hyperlipidemia    3. PVD (peripheral vascular disease) (CMS/[facility])    4. Atherosclerosis of native coronary artery of native heart without angina pectoris    5. Diverticulosis of large intestine without hemorrhage    6. Enlarged prostate without lower urinary tract symptoms (luts)    7. Stage 3 chronic kidney disease, unspecified whether stage 3a or 3b CKD (CMS/HCC)    8. Tubular adenoma    9. Insomnia, unspecified type    10. Other emphysema (CMS/HCC)  -     Fluticasone-Umeclidin-Vilant (Trelegy Ellipta) 200-62.5-25 MCG/INH aerosol powder ; Inhale 1 puff Every Morning.  Dispense: 1 each; Refill: 5

## 2021-04-08 DIAGNOSIS — F41.9 ANXIETY: ICD-10-CM

## 2021-04-08 RX ORDER — CLONAZEPAM 0.5 MG/1
TABLET ORAL
Qty: 60 TABLET | Refills: 2 | Status: SHIPPED | OUTPATIENT
Start: 2021-04-08 | End: 2021-09-09

## 2021-04-19 ENCOUNTER — TELEPHONE (OUTPATIENT)
Dept: INTERNAL MEDICINE | Facility: CLINIC | Age: 86
End: 2021-04-19

## 2021-04-19 NOTE — TELEPHONE ENCOUNTER
Caller: [facility] [place], [place] - [address]  - [phone]  - [phone]    Relationship: Pharmacy    Best call back number: [phone]    What is the best time to reach you: ASAP    Who are you requesting to speak with (clinical staff, provider,  specific staff member): CLINICAL    What was the call regarding: REQUESTING A CALL BACK TO GO OVER UPDATED MED LIST    Do you require a callback: YES

## 2021-04-26 ENCOUNTER — PRIOR AUTHORIZATION (OUTPATIENT)
Dept: INTERNAL MEDICINE | Facility: CLINIC | Age: 86
End: 2021-04-26

## 2021-04-27 NOTE — TELEPHONE ENCOUNTER
Prior Authorization for Chikis Sharif 400mcg/ACT aerosol powder received however, medication not on pt's current med list? Continue with prior auth or disregard? Please advise, thanks  KEY:[id number]

## 2021-04-27 NOTE — TELEPHONE ENCOUNTER
Prior Authorization for Chikis Sharif 400mcg/act aerosol powder completed and sent to plan via [employer]. Status pending;   KEY:[id number]

## 2021-04-28 ENCOUNTER — OFFICE VISIT (OUTPATIENT)
Dept: INTERNAL MEDICINE | Facility: CLINIC | Age: 86
End: 2021-04-28

## 2021-04-28 VITALS
RESPIRATION RATE: 16 BRPM | OXYGEN SATURATION: 98 % | WEIGHT: 164 LBS | DIASTOLIC BLOOD PRESSURE: 70 MMHG | BODY MASS INDEX: 24.29 KG/M2 | HEIGHT: 69 IN | TEMPERATURE: 97.1 F | HEART RATE: 62 BPM | SYSTOLIC BLOOD PRESSURE: 136 MMHG

## 2021-04-28 DIAGNOSIS — M25.551 RIGHT HIP PAIN: ICD-10-CM

## 2021-04-28 DIAGNOSIS — M70.71 BURSITIS OF RIGHT HIP, UNSPECIFIED BURSA: Primary | ICD-10-CM

## 2021-04-28 PROCEDURE — 96372 THER/PROPH/DIAG INJ SC/IM: CPT | Performed by: NURSE PRACTITIONER

## 2021-04-28 PROCEDURE — 99214 OFFICE O/P EST MOD 30 MIN: CPT | Performed by: NURSE PRACTITIONER

## 2021-04-28 RX ORDER — TRIAMCINOLONE ACETONIDE 40 MG/ML
40 INJECTION, SUSPENSION INTRA-ARTICULAR; INTRAMUSCULAR ONCE
Status: COMPLETED | OUTPATIENT
Start: 2021-04-28 | End: 2021-04-28

## 2021-04-28 RX ORDER — PREDNISONE 1 MG/1
TABLET ORAL
Qty: 21 TABLET | Refills: 0 | OUTPATIENT
Start: 2021-04-28 | End: 2021-09-20

## 2021-04-28 RX ORDER — ACLIDINIUM BROMIDE 400 UG/1
POWDER, METERED RESPIRATORY (INHALATION)
COMMUNITY
Start: 2021-03-25 | End: 2021-09-20

## 2021-04-28 RX ADMIN — TRIAMCINOLONE ACETONIDE 40 MG: 40 INJECTION, SUSPENSION INTRA-ARTICULAR; INTRAMUSCULAR at 09:30

## 2021-04-28 NOTE — PROGRESS NOTES
"Subjective   Chief Complaint   Patient presents with   • Hip Pain     Right  \"I think I have Bursitis, I have a long history of it\" x 3 weeks      Rylan Zimmer is a 85 y.o. male here today for right hip and leg pain. Patient has history of right hip bursitis and normally sees Dr. Gonzalez for bursitis injection but he is not in the office this week. Pain starts at hip and radiates down into leg. He's had steroid shot in office and taper pack before that worked really well for him. Having difficult time sleeping due to pain. No worsening of gait stability.     I have reviewed the following portions of the patient's history and confirmed they are accurate: allergies, current medications, past family history, past medical history, past social history, past surgical history and problem list    I have personally completed the patient's review of systems.    Review of Systems   Constitutional: Positive for activity change. Negative for appetite change, chills, diaphoresis, fatigue, fever, unexpected weight gain and unexpected weight loss.   Gastrointestinal: Negative for abdominal pain, constipation, diarrhea, nausea and vomiting.   Genitourinary: Negative for dysuria, flank pain, frequency, hematuria and urgency.   Musculoskeletal: Positive for arthralgias, back pain, myalgias and bursitis. Negative for gait problem, joint swelling, neck pain and neck stiffness.   Skin: Negative for color change, pallor and rash.   Allergic/Immunologic: Negative for immunocompromised state.   Neurological: Negative for seizures, speech difficulty, weakness and numbness.   Hematological: Negative for adenopathy.   Psychiatric/Behavioral: Positive for sleep disturbance. Negative for agitation, decreased concentration, suicidal ideas and depressed mood. The patient is not nervous/anxious.        Current Outpatient Medications on File Prior to Visit   Medication Sig   • amLODIPine (NORVASC) 2.5 MG tablet TAKE 1 TABLET BY MOUTH EVERY NIGHT " "  • ARIPiprazole (Abilify) 2 MG tablet Take 1 tablet by mouth Daily.   • aspirin 81 MG tablet Take 81 mg by mouth Daily.   • carvedilol (COREG) 12.5 MG tablet Take 12.5 mg by mouth 2 (Two) Times a Day.   • clonazePAM (KlonoPIN) 0.5 MG tablet TAKE 1 TO 2 TABLETS BY MOUTH AT BEDTIME   • diclofenac (VOLTAREN) 1 % gel gel APPLY GEL TOPICALLY TO APPROPRIATE AREA AS DIRECTED   • Diclofenac Sodium (VOLTAREN) 1 % gel gel APPLY TO AFFECTED AREA 2 TIMES A DAY   • docusate sodium (Colace) 100 MG capsule Take 1 capsule by mouth 2 (Two) Times a Day. If taking percocet   • DULoxetine (CYMBALTA) 60 MG capsule TAKE ONE CAPSULE BY MOUTH EVERY DAY   • ezetimibe (Zetia) 10 MG tablet Take 1 tablet by mouth Daily. Continue crestor   • Fluticasone-Umeclidin-Vilant (Trelegy Ellipta) 200-62.5-25 MCG/INH aerosol powder  Inhale 1 puff Every Morning.   • paricalcitol (ZEMPLAR) 1 MCG capsule Monday, Wednesday and Friday   • rosuvastatin (CRESTOR) 20 MG tablet Take 20 mg by mouth Every Night.   • sulfaSALAzine (AZULFIDINE) 500 MG tablet Take 500 mg by mouth 2 (Two) Times a Day.   • Tudorza Pressair 400 MCG/ACT aerosol powder  powder for inhalation    • zolpidem CR (AMBIEN CR) 12.5 MG CR tablet Take 12.5 mg by mouth Daily.   • [DISCONTINUED] predniSONE (DELTASONE) 10 MG tablet Take 1 tablet by mouth Daily With Breakfast.     No current facility-administered medications on file prior to visit.       Objective   Vitals:    04/28/21 0849   BP: 136/70   Pulse: 62   Resp: 16   Temp: 97.1 °F (36.2 °C)   TempSrc: Temporal   SpO2: 98%   Weight: 74.4 kg (164 lb)   Height: 175.3 cm (69.02\")   PainSc:   7   PainLoc: Hip  Comment: right     Body mass index is 24.2 kg/m².    Physical Exam  Vitals reviewed.   Constitutional:       Appearance: Normal appearance. He is well-developed.   HENT:      Head: Normocephalic and atraumatic.      Nose: Nose normal.   Eyes:      General: Lids are normal.      Conjunctiva/sclera: Conjunctivae normal.      Pupils: Pupils " are equal, round, and reactive to light.   Neck:      Thyroid: No thyromegaly.      Trachea: Trachea normal.   Pulmonary:      Effort: Pulmonary effort is normal. No respiratory distress.   Musculoskeletal:      Right hip: Tenderness present. No bony tenderness. Decreased range of motion.   Skin:     General: Skin is warm and dry.   Neurological:      Mental Status: He is alert and oriented to person, place, and time.      GCS: GCS eye subscore is 4. GCS verbal subscore is 5. GCS motor subscore is 6.   Psychiatric:         Attention and Perception: Attention normal.         Mood and Affect: Mood normal.         Speech: Speech normal.         Behavior: Behavior normal. Behavior is cooperative.         Assessment/Plan   Problem List Items Addressed This Visit     None      Visit Diagnoses     Bursitis of right hip, unspecified bursa    -  Primary  Chronic issue unstable requiring medication management and monitoring. Will eat well balanced diet, increase water intake, increase physical activity as tolerated, and get adequate rest. Can use warmth or ice for discomfort in this area. Discussed stretching exercises.   Kenalog shot today and start pred pack tomorrow. Will follow up with ortho asap.       Relevant Medications    triamcinolone acetonide (KENALOG-40) injection 40 mg (Completed)    predniSONE (DELTASONE) 5 MG tablet    Right hip pain      hronic issue unstable requiring medication management and monitoring. Will eat well balanced diet, increase water intake, increase physical activity as tolerated, and get adequate rest. Can use warmth or ice for discomfort in this area. Discussed stretching exercises.   Kenalog shot today and start pred pack tomorrow. Will follow up with ortho asap.       Relevant Medications    triamcinolone acetonide (KENALOG-40) injection 40 mg (Completed)    predniSONE (DELTASONE) 5 MG tablet             Current Outpatient Medications:   •  amLODIPine (NORVASC) 2.5 MG tablet, TAKE 1  TABLET BY MOUTH EVERY NIGHT, Disp: 90 tablet, Rfl: 3  •  ARIPiprazole (Abilify) 2 MG tablet, Take 1 tablet by mouth Daily., Disp: 30 tablet, Rfl: 5  •  aspirin 81 MG tablet, Take 81 mg by mouth Daily., Disp: , Rfl:   •  carvedilol (COREG) 12.5 MG tablet, Take 12.5 mg by mouth 2 (Two) Times a Day., Disp: , Rfl: 0  •  clonazePAM (KlonoPIN) 0.5 MG tablet, TAKE 1 TO 2 TABLETS BY MOUTH AT BEDTIME, Disp: 60 tablet, Rfl: 2  •  diclofenac (VOLTAREN) 1 % gel gel, APPLY GEL TOPICALLY TO APPROPRIATE AREA AS DIRECTED, Disp: 100 g, Rfl: 2  •  Diclofenac Sodium (VOLTAREN) 1 % gel gel, APPLY TO AFFECTED AREA 2 TIMES A DAY, Disp: 100 g, Rfl: 5  •  docusate sodium (Colace) 100 MG capsule, Take 1 capsule by mouth 2 (Two) Times a Day. If taking percocet, Disp: 15 capsule, Rfl: 1  •  DULoxetine (CYMBALTA) 60 MG capsule, TAKE ONE CAPSULE BY MOUTH EVERY DAY, Disp: 90 capsule, Rfl: 3  •  ezetimibe (Zetia) 10 MG tablet, Take 1 tablet by mouth Daily. Continue crestor, Disp: 90 tablet, Rfl: 3  •  Fluticasone-Umeclidin-Vilant (Trelegy Ellipta) 200-62.5-25 MCG/INH aerosol powder , Inhale 1 puff Every Morning., Disp: 1 each, Rfl: 5  •  paricalcitol (ZEMPLAR) 1 MCG capsule, Monday, Wednesday and Friday, Disp: , Rfl: 0  •  rosuvastatin (CRESTOR) 20 MG tablet, Take 20 mg by mouth Every Night., Disp: , Rfl:   •  sulfaSALAzine (AZULFIDINE) 500 MG tablet, Take 500 mg by mouth 2 (Two) Times a Day., Disp: , Rfl: 0  •  Tudorza Pressair 400 MCG/ACT aerosol powder  powder for inhalation, , Disp: , Rfl:   •  zolpidem CR (AMBIEN CR) 12.5 MG CR tablet, Take 12.5 mg by mouth Daily., Disp: , Rfl: 0  •  predniSONE (DELTASONE) 5 MG tablet, Take as directed on package instruction - 6 day taper. Start tomorrow 4/29/21, Disp: 21 tablet, Rfl: 0  No current facility-administered medications for this visit.       Plan of care reviewed with the patient at the conclusion of today's visit.  Education was provided regarding diagnosis, management, and any prescribed or  recommended OTC medications.  Patient verbalized understanding of and agreement with management plan.     Return if symptoms worsen or fail to improve.      Mohini Madrid, ALEX    Please note that portions of this note were completed with a voice recognition program. Efforts were made to edit the dictations, but occasionally words are mistranscribed.

## 2021-05-21 ENCOUNTER — OFFICE VISIT (OUTPATIENT)
Dept: INTERNAL MEDICINE | Facility: CLINIC | Age: 86
End: 2021-05-21

## 2021-05-21 VITALS
HEIGHT: 69 IN | BODY MASS INDEX: 25.48 KG/M2 | HEART RATE: 64 BPM | WEIGHT: 172 LBS | DIASTOLIC BLOOD PRESSURE: 70 MMHG | OXYGEN SATURATION: 98 % | SYSTOLIC BLOOD PRESSURE: 130 MMHG | TEMPERATURE: 97.1 F

## 2021-05-21 DIAGNOSIS — N40.0 ENLARGED PROSTATE WITHOUT LOWER URINARY TRACT SYMPTOMS (LUTS): ICD-10-CM

## 2021-05-21 DIAGNOSIS — G47.00 INSOMNIA, UNSPECIFIED TYPE: ICD-10-CM

## 2021-05-21 DIAGNOSIS — D36.9 TUBULAR ADENOMA: ICD-10-CM

## 2021-05-21 DIAGNOSIS — J43.8 OTHER EMPHYSEMA (HCC): ICD-10-CM

## 2021-05-21 DIAGNOSIS — N18.30 STAGE 3 CHRONIC KIDNEY DISEASE, UNSPECIFIED WHETHER STAGE 3A OR 3B CKD (HCC): ICD-10-CM

## 2021-05-21 DIAGNOSIS — E78.49 OTHER HYPERLIPIDEMIA: ICD-10-CM

## 2021-05-21 DIAGNOSIS — K57.30 DIVERTICULOSIS OF LARGE INTESTINE WITHOUT HEMORRHAGE: ICD-10-CM

## 2021-05-21 DIAGNOSIS — I10 ESSENTIAL HYPERTENSION: Primary | ICD-10-CM

## 2021-05-21 DIAGNOSIS — E55.9 VITAMIN D DEFICIENCY: ICD-10-CM

## 2021-05-21 PROCEDURE — 99214 OFFICE O/P EST MOD 30 MIN: CPT | Performed by: INTERNAL MEDICINE

## 2021-05-21 NOTE — PROGRESS NOTES
Patient is a 85 y.o. male who is here for a follow up of hyperlipidemia and hypertension.  Chief Complaint   Patient presents with   • Hyperlipidemia   • Hypertension         HPI:    Here for mgmt of HTN and CKD and COPD.  Last visit was changed to Trelegy.  BP has been elevated.  No fever or chills.  No HAs.  No abdominal pains.  Breathing well.  No CP.  No falls.  Pain control is adequate.      History:     Patient Active Problem List   Diagnosis   • Anxiety   • Aortic aneurysm (CMS/HCC)   • Atherosclerotic heart disease of native coronary artery without angina pectoris   • Chronic obstructive pulmonary disease (CMS/HCC)   • Chronic kidney disease, stage III (moderate) (CMS/HCC)   • Diverticulosis of large intestine   • Enlarged prostate without lower urinary tract symptoms (luts)   • Generalized osteoarthritis   • Hyperlipidemia   • Hypertension   • Insomnia   • Neuropathy   • PVD (peripheral vascular disease) (CMS/HCC)   • Tubular adenoma   • Absence of kidney   • Arthralgia of hip   • Lower urinary tract symptoms (LUTS)   • Routine general medical examination at a health care facility   • Multiple falls   • Acute UTI (urinary tract infection)       Past Medical History:   Diagnosis Date   • Arthritis    • Colon polyps    • COPD (chronic obstructive pulmonary disease) (CMS/HCC)    • Ecchymosis    • Elevated cholesterol    • Heart attack (CMS/HCC)    • Hypertension    • Kidney stones    • Wears glasses        Past Surgical History:   Procedure Laterality Date   • CARDIAC CATHETERIZATION  2010    cardiac stent times one, patient states that 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   • COLONOSCOPY     • CYSTOSCOPY TRANSURETHRAL RESECTION OF PROSTATE N/A 10/27/2020    Procedure: TRANSURETHRAL RESECTION OF PROSTATE;  Surgeon: Gabriel Echevarria MD;  Location: [facility];  Service: Urology;  Laterality: N/A;   • ROTATOR CUFF REPAIR Right 2008   • TONSILLECTOMY         Current Outpatient  Medications on File Prior to Visit   Medication Sig   • amLODIPine (NORVASC) 2.5 MG tablet TAKE 1 TABLET BY MOUTH EVERY NIGHT   • ARIPiprazole (Abilify) 2 MG tablet Take 1 tablet by mouth Daily.   • aspirin 81 MG tablet Take 81 mg by mouth Daily.   • carvedilol (COREG) 12.5 MG tablet Take 12.5 mg by mouth 2 (Two) Times a Day.   • clonazePAM (KlonoPIN) 0.5 MG tablet TAKE 1 TO 2 TABLETS BY MOUTH AT BEDTIME   • diclofenac (VOLTAREN) 1 % gel gel APPLY GEL TOPICALLY TO APPROPRIATE AREA AS DIRECTED   • Diclofenac Sodium (VOLTAREN) 1 % gel gel APPLY TO AFFECTED AREA 2 TIMES A DAY   • docusate sodium (Colace) 100 MG capsule Take 1 capsule by mouth 2 (Two) Times a Day. If taking percocet   • DULoxetine (CYMBALTA) 60 MG capsule TAKE ONE CAPSULE BY MOUTH EVERY DAY   • ezetimibe (Zetia) 10 MG tablet Take 1 tablet by mouth Daily. Continue crestor   • Fluticasone-Umeclidin-Vilant (Trelegy Ellipta) 200-62.5-25 MCG/INH aerosol powder  Inhale 1 puff Every Morning.   • paricalcitol (ZEMPLAR) 1 MCG capsule Monday, Wednesday and Friday   • predniSONE (DELTASONE) 5 MG tablet Take as directed on package instruction - 6 day taper. Start tomorrow 4/29/21   • rosuvastatin (CRESTOR) 20 MG tablet Take 20 mg by mouth Every Night.   • sulfaSALAzine (AZULFIDINE) 500 MG tablet Take 500 mg by mouth 2 (Two) Times a Day.   • Tudorza Pressair 400 MCG/ACT aerosol powder  powder for inhalation    • zolpidem CR (AMBIEN CR) 12.5 MG CR tablet Take 12.5 mg by mouth Daily.     No current facility-administered medications on file prior to visit.       Family History   Problem Relation Age of Onset   • Cancer Other    • Heart attack Other    • Hypertension Other    • Hyperlipidemia Other        Social History     Socioeconomic History   • Marital status:      Spouse name: Not on file   • Number of children: Not on file   • Years of education: Not on file   • Highest education level: Not on file   Tobacco Use   • Smoking status: Former Smoker     Types:  "Cigarettes     Quit date:      Years since quittin.3   • Smokeless tobacco: Never Used   Vaping Use   • Vaping Use: Never used   Substance and Sexual Activity   • Alcohol use: Never   • Drug use: Never   • Sexual activity: Defer         Review of Systems   Constitutional: Negative for chills, diaphoresis, fever and unexpected weight change.   HENT: Negative for congestion, ear pain, hearing loss, nosebleeds, postnasal drip, sinus pressure and sore throat.    Eyes: Negative for pain, discharge and itching.   Respiratory: Positive for shortness of breath. Negative for cough, chest tightness and wheezing.    Cardiovascular: Negative for chest pain, palpitations and leg swelling.   Gastrointestinal: Negative for abdominal distention, abdominal pain, blood in stool, constipation, diarrhea, nausea and vomiting.        5/15 colonoscopy with TA   \" \"   Endocrine: Negative for polydipsia and polyuria.   Genitourinary: Negative for difficulty urinating, dysuria, frequency and hematuria.        Followed by urology   Musculoskeletal: Positive for arthralgias (in hips) and back pain. Negative for gait problem, joint swelling and myalgias.   Skin: Negative for rash and wound.   Neurological: Negative for dizziness, syncope and headaches.   Psychiatric/Behavioral: Positive for sleep disturbance. Negative for dysphoric mood. The patient is not nervous/anxious.        /70   Pulse 64   Temp 97.1 °F (36.2 °C) (Infrared)   Ht 175.3 cm (69.02\")   Wt 78 kg (172 lb)   SpO2 98%   BMI 25.39 kg/m²       Physical Exam  Constitutional:       Appearance: Normal appearance. He is well-developed.   HENT:      Head: Normocephalic and atraumatic.      Right Ear: External ear normal.      Left Ear: External ear normal.      Nose: Nose normal.      Mouth/Throat:      Mouth: Mucous membranes are moist.      Pharynx: Oropharynx is clear.   Eyes:      Extraocular Movements: Extraocular movements intact.      Conjunctiva/sclera: " Conjunctivae normal.      Pupils: Pupils are equal, round, and reactive to light.   Cardiovascular:      Rate and Rhythm: Normal rate and regular rhythm.      Heart sounds: Normal heart sounds.   Pulmonary:      Effort: Pulmonary effort is normal.      Comments: Diminished BS  Abdominal:      General: Bowel sounds are normal.      Palpations: Abdomen is soft.   Musculoskeletal:         General: Normal range of motion.      Cervical back: Normal range of motion and neck supple.   Lymphadenopathy:      Cervical: No cervical adenopathy.   Skin:     General: Skin is warm and dry.   Neurological:      General: No focal deficit present.      Mental Status: He is alert and oriented to person, place, and time.   Psychiatric:         Mood and Affect: Mood normal.         Behavior: Behavior normal.         Thought Content: Thought content normal.         Procedure:      Discussion/Summary:    htn-controlled on CCB and BB, goal of 150/80  Cri-labs noted, advised adequate hydration and NSAID avoidance  copd-improved Trelegy  hyperlipidemia-labs soon on Crestor/Zetia, counseled on diet and wt loss  bph-Urology f/u , s/p TURP  diverticulosis-citrucel qd , asymptomatic  TA-colonoscopy noted, needs scope 7/23  cad/pvd-cont rf mod, asymptomatic  insomnia-cont klonopine qhs prn, advised of risk  djd/neuropathy-cont cymbalta and gabapentin, f/u Dr Garduno, stable for the most part  Diverticulosis-counseled on increased fiber      10/12 labs noted and dw patient    Current Outpatient Medications:   •  amLODIPine (NORVASC) 2.5 MG tablet, TAKE 1 TABLET BY MOUTH EVERY NIGHT, Disp: 90 tablet, Rfl: 3  •  ARIPiprazole (Abilify) 2 MG tablet, Take 1 tablet by mouth Daily., Disp: 30 tablet, Rfl: 5  •  aspirin 81 MG tablet, Take 81 mg by mouth Daily., Disp: , Rfl:   •  carvedilol (COREG) 12.5 MG tablet, Take 12.5 mg by mouth 2 (Two) Times a Day., Disp: , Rfl: 0  •  clonazePAM (KlonoPIN) 0.5 MG tablet, TAKE 1 TO 2 TABLETS BY MOUTH AT BEDTIME, Disp:  60 tablet, Rfl: 2  •  diclofenac (VOLTAREN) 1 % gel gel, APPLY GEL TOPICALLY TO APPROPRIATE AREA AS DIRECTED, Disp: 100 g, Rfl: 2  •  Diclofenac Sodium (VOLTAREN) 1 % gel gel, APPLY TO AFFECTED AREA 2 TIMES A DAY, Disp: 100 g, Rfl: 5  •  docusate sodium (Colace) 100 MG capsule, Take 1 capsule by mouth 2 (Two) Times a Day. If taking percocet, Disp: 15 capsule, Rfl: 1  •  DULoxetine (CYMBALTA) 60 MG capsule, TAKE ONE CAPSULE BY MOUTH EVERY DAY, Disp: 90 capsule, Rfl: 3  •  ezetimibe (Zetia) 10 MG tablet, Take 1 tablet by mouth Daily. Continue crestor, Disp: 90 tablet, Rfl: 3  •  Fluticasone-Umeclidin-Vilant (Trelegy Ellipta) 200-62.5-25 MCG/INH aerosol powder , Inhale 1 puff Every Morning., Disp: 1 each, Rfl: 5  •  paricalcitol (ZEMPLAR) 1 MCG capsule, Monday, Wednesday and Friday, Disp: , Rfl: 0  •  predniSONE (DELTASONE) 5 MG tablet, Take as directed on package instruction - 6 day taper. Start tomorrow 4/29/21, Disp: 21 tablet, Rfl: 0  •  rosuvastatin (CRESTOR) 20 MG tablet, Take 20 mg by mouth Every Night., Disp: , Rfl:   •  sulfaSALAzine (AZULFIDINE) 500 MG tablet, Take 500 mg by mouth 2 (Two) Times a Day., Disp: , Rfl: 0  •  Tudorza Pressair 400 MCG/ACT aerosol powder  powder for inhalation, , Disp: , Rfl:   •  zolpidem CR (AMBIEN CR) 12.5 MG CR tablet, Take 12.5 mg by mouth Daily., Disp: , Rfl: 0        Diagnoses and all orders for this visit:    1. Essential hypertension (Primary)  -     CBC (No Diff)    2. Other hyperlipidemia  -     Comprehensive Metabolic Panel  -     Lipid Panel  -     TSH    3. Diverticulosis of large intestine without hemorrhage    4. Enlarged prostate without lower urinary tract symptoms (luts)    5. Stage 3 chronic kidney disease, unspecified whether stage 3a or 3b CKD (CMS/HCC)    6. Tubular adenoma    7. Other emphysema (CMS/HCC)    8. Insomnia, unspecified type    9. Vitamin D deficiency  -     Vitamin D 25 Hydroxy

## 2021-05-24 RX ORDER — ARIPIPRAZOLE 2 MG/1
TABLET ORAL
Qty: 90 TABLET | Refills: 3 | Status: SHIPPED | OUTPATIENT
Start: 2021-05-24 | End: 2021-10-18

## 2021-06-16 ENCOUNTER — TELEPHONE (OUTPATIENT)
Dept: INTERNAL MEDICINE | Facility: CLINIC | Age: 86
End: 2021-06-16

## 2021-06-16 DIAGNOSIS — M25.551 RIGHT HIP PAIN: Primary | ICD-10-CM

## 2021-06-16 NOTE — TELEPHONE ENCOUNTER
PATIENT IS HAVING LOWER BACK AND RIGHT HIP PAIN AND WANTED TO KNOW IF DR HAWTHORNE WOULD REFER HIM TO ORTHO?    Called and will refer to ortho

## 2021-06-28 ENCOUNTER — OFFICE VISIT (OUTPATIENT)
Dept: CARDIAC SURGERY | Facility: CLINIC | Age: 86
End: 2021-06-28

## 2021-06-28 VITALS
WEIGHT: 176 LBS | HEIGHT: 69 IN | OXYGEN SATURATION: 98 % | HEART RATE: 66 BPM | BODY MASS INDEX: 26.07 KG/M2 | TEMPERATURE: 97.9 F | SYSTOLIC BLOOD PRESSURE: 161 MMHG | DIASTOLIC BLOOD PRESSURE: 68 MMHG

## 2021-06-28 DIAGNOSIS — I71.40 ABDOMINAL AORTIC ANEURYSM (AAA) WITHOUT RUPTURE (HCC): Primary | ICD-10-CM

## 2021-06-28 PROCEDURE — 99205 OFFICE O/P NEW HI 60 MIN: CPT | Performed by: THORACIC SURGERY (CARDIOTHORACIC VASCULAR SURGERY)

## 2021-06-28 NOTE — PROGRESS NOTES
06/28/2021  Patient Information  Rylan Zimmer                                                                                          [address] [facility] [address]   1935  'PCP/Referring Physician'  Juan Macias MD  [phone]  No ref. provider found    Chief Complaint   Patient presents with   • Consult     Np referred for an abdominal aortic aneurysm,complains of some lower back pain.   • Aortic Aneurysm       History of Present Illness:   The patient is an 85-year-old male who has been referred for a 5 cm infrarenal abdominal aneurysm by Dr. Krueger. He has been followed closely by Dr. Krueger for this and it was 3.8 in 2018.  It has now grown to 5 cm.  He denies any abdominal or back pain that I can elicit at this time.  He has a multitude of other problems that was carefully outlined in the H&P. Pertinent to this, he has had bilateral iliac stents placed.  He also has stage III chronic renal insufficiency and COPD.      Patient Active Problem List   Diagnosis   • Anxiety   • Aortic aneurysm (CMS/HCC)   • Atherosclerotic heart disease of native coronary artery without angina pectoris   • Chronic obstructive pulmonary disease (CMS/HCC)   • Chronic kidney disease, stage III (moderate) (CMS/HCC)   • Diverticulosis of large intestine   • Enlarged prostate without lower urinary tract symptoms (luts)   • Generalized osteoarthritis   • Hyperlipidemia   • Hypertension   • Insomnia   • Neuropathy   • PVD (peripheral vascular disease) (CMS/HCC)   • Tubular adenoma   • Absence of kidney   • Arthralgia of hip   • Lower urinary tract symptoms (LUTS)   • Routine general medical examination at a health care facility   • Multiple falls   • Acute UTI (urinary tract infection)   • Abdominal aortic aneurysm (AAA) without rupture (CMS/HCC)     Past Medical History:   Diagnosis Date   • Arthritis    • Colon polyps    • COPD (chronic obstructive pulmonary disease) (CMS/HCC)    • Ecchymosis    • Elevated  cholesterol    • GERD (gastroesophageal reflux disease)    • Heart attack (CMS/HCC)    • Hypertension    • Kidney stones    • Peripheral vascular disease (CMS/HCC)    • Wears glasses      Past Surgical History:   Procedure Laterality Date   • BACK SURGERY      has a lump removed/not cancer   • CARDIAC CATHETERIZATION  2010    cardiac stent times one, patient states that 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   • COLONOSCOPY     • CORONARY STENT PLACEMENT     • CYSTOSCOPY TRANSURETHRAL RESECTION OF PROSTATE N/A 10/27/2020    Procedure: TRANSURETHRAL RESECTION OF PROSTATE;  Surgeon: Gabriel Echevarria MD;  Location: [facility];  Service: Urology;  Laterality: N/A;   • ROTATOR CUFF REPAIR Right 2008   • SINUS SURGERY     • TONSILLECTOMY         Current Outpatient Medications:   •  amLODIPine (NORVASC) 2.5 MG tablet, TAKE 1 TABLET BY MOUTH EVERY NIGHT, Disp: 90 tablet, Rfl: 3  •  ARIPiprazole (ABILIFY) 2 MG tablet, TAKE ONE TABLET BY MOUTH EVERY DAY, Disp: 90 tablet, Rfl: 3  •  aspirin 81 MG tablet, Take 81 mg by mouth Daily., Disp: , Rfl:   •  carvedilol (COREG) 12.5 MG tablet, Take 12.5 mg by mouth 2 (Two) Times a Day., Disp: , Rfl: 0  •  clonazePAM (KlonoPIN) 0.5 MG tablet, TAKE 1 TO 2 TABLETS BY MOUTH AT BEDTIME, Disp: 60 tablet, Rfl: 2  •  diclofenac (VOLTAREN) 1 % gel gel, APPLY GEL TOPICALLY TO APPROPRIATE AREA AS DIRECTED, Disp: 100 g, Rfl: 2  •  Diclofenac Sodium (VOLTAREN) 1 % gel gel, APPLY TO AFFECTED AREA 2 TIMES A DAY, Disp: 100 g, Rfl: 5  •  DULoxetine (CYMBALTA) 60 MG capsule, TAKE ONE CAPSULE BY MOUTH EVERY DAY, Disp: 90 capsule, Rfl: 3  •  ezetimibe (Zetia) 10 MG tablet, Take 1 tablet by mouth Daily. Continue crestor, Disp: 90 tablet, Rfl: 3  •  Fluticasone-Umeclidin-Vilant (Trelegy Ellipta) 200-62.5-25 MCG/INH aerosol powder , Inhale 1 puff Every Morning., Disp: 1 each, Rfl: 5  •  paricalcitol (ZEMPLAR) 1 MCG capsule, Monday, Wednesday and Friday, Disp: , Rfl: 0  •   rosuvastatin (CRESTOR) 20 MG tablet, Take 20 mg by mouth Every Night., Disp: , Rfl:   •  docusate sodium (Colace) 100 MG capsule, Take 1 capsule by mouth 2 (Two) Times a Day. If taking percocet, Disp: 15 capsule, Rfl: 1  •  predniSONE (DELTASONE) 5 MG tablet, Take as directed on package instruction - 6 day taper. Start tomorrow 21, Disp: 21 tablet, Rfl: 0  •  sulfaSALAzine (AZULFIDINE) 500 MG tablet, Take 500 mg by mouth 2 (Two) Times a Day., Disp: , Rfl: 0  •  Tudorza Pressair 400 MCG/ACT aerosol powder  powder for inhalation, , Disp: , Rfl:   •  zolpidem CR (AMBIEN CR) 12.5 MG CR tablet, Take 12.5 mg by mouth Daily., Disp: , Rfl: 0  Allergies   Allergen Reactions   • Quinine Derivatives Itching   • Penicillins Rash     Tolerates ceftriaxone     Social History     Socioeconomic History   • Marital status:      Spouse name: Not on file   • Number of children: 2   • Years of education: Not on file   • Highest education level: Not on file   Tobacco Use   • Smoking status: Former Smoker     Packs/day: 1.00     Years: 30.00     Pack years: 30.00     Types: Cigarettes     Quit date:      Years since quittin.4   • Smokeless tobacco: Never Used   Vaping Use   • Vaping Use: Never used   Substance and Sexual Activity   • Alcohol use: Never   • Drug use: Never   • Sexual activity: Defer     Family History   Problem Relation Age of Onset   • Cancer Other    • Heart attack Other    • Hypertension Other    • Hyperlipidemia Other    • Stroke Mother    • Colon cancer Father    • Heart attack Father    • Hypertension Father      Review of Systems   Constitutional: Positive for malaise/fatigue. Negative for chills, fever, night sweats and weight loss.   HENT: Positive for hearing loss (wears hearing aids). Negative for odynophagia and sore throat.    Cardiovascular: Positive for dyspnea on exertion. Negative for chest pain, leg swelling, orthopnea and palpitations.   Respiratory: Negative.  Negative for cough  "and hemoptysis.    Endocrine: Negative for cold intolerance, heat intolerance, polydipsia, polyphagia and polyuria.   Hematologic/Lymphatic: Negative.  Does not bruise/bleed easily.   Skin: Negative.  Negative for itching and rash.   Musculoskeletal: Positive for back pain, falls, joint pain and muscle cramps. Negative for joint swelling and myalgias.   Gastrointestinal: Positive for dysphagia. Negative for abdominal pain, constipation, diarrhea, hematemesis, hematochezia, melena, nausea and vomiting.   Genitourinary: Positive for nocturia. Negative for dysuria, frequency and hematuria.   Neurological: Negative.  Negative for focal weakness, headaches, numbness and seizures.   Psychiatric/Behavioral: Negative for suicidal ideas. The patient has insomnia.    All other systems reviewed and are negative.    Vitals:    06/28/21 1235   BP: 161/68   BP Location: Right arm   Patient Position: Sitting   Pulse: 66   Temp: 97.9 °F (36.6 °C)   SpO2: 98%   Weight: 79.8 kg (176 lb)   Height: 175.3 cm (69\")      Physical Exam  Vitals and nursing note reviewed.   Constitutional:       General: He is not in acute distress.     Appearance: He is well-developed.   HENT:      Head: Normocephalic.   Eyes:      Conjunctiva/sclera: Conjunctivae normal.      Pupils: Pupils are equal, round, and reactive to light.   Neck:      Thyroid: No thyroid mass or thyromegaly.   Cardiovascular:      Rate and Rhythm: Normal rate.      Heart sounds: No murmur heard.   No friction rub. No gallop.    Pulmonary:      Breath sounds: No wheezing, rhonchi or rales.   Abdominal:      General: Bowel sounds are normal. There is no distension.      Palpations: Abdomen is soft. There is no mass.      Tenderness: There is no abdominal tenderness.   Musculoskeletal:         General: No deformity. Normal range of motion.      Cervical back: Normal range of motion.   Skin:     Findings: No petechiae.      Nails: There is no clubbing.   Neurological:      Mental " Status: He is oriented to person, place, and time.      Cranial Nerves: No cranial nerve deficit.      Sensory: No sensory deficit.   Psychiatric:         Behavior: Behavior normal.         The ROS, past medical history, surgical history, family history, social history and vitals were reviewed by myself and corrected as needed.      Labs/Imaging:  I have obtained and reviewed the medical records from Dr. Krueger, including the CT scan demonstrating a 5 cm infrarenal abdominal aneurysm.     Assessment/Plan:  The patient is an 85-year-old male who presents with a 5 cm infrarenal abdominal aneurysm. I have personally obtained and reviewed the CT scan from November, 2020.  I concur with this interpretation that aneurysm is infrarenal and is approximately 5 cm.  Pertinent to this is also the fact that he had, what appears to be, bilateral common iliac stents which extends up in a kissing balloon fashion into the aneurysm itself.  This certainly could be a huge issue in placement of the endograft.  I have obtained and discussed this with Dewayne Ruano, the [facility] .  We will discuss this further.  He also has many other problems, including history of chronic renal insufficiency stage III.  This is being monitored by the [facility] closely.  We will need to watch his contrast during the procedure.  Certainly, this could place him into renal failure.  He is aware of this as well.  I discussed this with him.  He also has hypertension and dyslipidemia.  Because of all these problems, he certainly is an extraordinarily high surgical risk for this.  If this risk is felt to be too high, then we might continue to monitor this up to 5.5 cm, but at this time, I would lean toward repair if we felt we could do it adequately.  I discussed the procedure, risks, and alternatives.  He appears to understand all the above and we will get back with him in regards to this.    Patient Active Problem List   Diagnosis   • Anxiety   •  Aortic aneurysm (CMS/HCC)   • Atherosclerotic heart disease of native coronary artery without angina pectoris   • Chronic obstructive pulmonary disease (CMS/HCC)   • Chronic kidney disease, stage III (moderate) (CMS/HCC)   • Diverticulosis of large intestine   • Enlarged prostate without lower urinary tract symptoms (luts)   • Generalized osteoarthritis   • Hyperlipidemia   • Hypertension   • Insomnia   • Neuropathy   • PVD (peripheral vascular disease) (CMS/HCC)   • Tubular adenoma   • Absence of kidney   • Arthralgia of hip   • Lower urinary tract symptoms (LUTS)   • Routine general medical examination at a health care facility   • Multiple falls   • Acute UTI (urinary tract infection)   • Abdominal aortic aneurysm (AAA) without rupture (CMS/HCC)       CC: MD Mumtaz Arnold MD Regina Fugate editing for Pete Stark MD    I, Pete Stark MD, have read and agree with the editing done by Arin Fox, .

## 2021-06-29 PROBLEM — I71.40 ABDOMINAL AORTIC ANEURYSM (AAA) WITHOUT RUPTURE (HCC): Status: ACTIVE | Noted: 2021-06-29

## 2021-07-01 DIAGNOSIS — I71.40 ABDOMINAL AORTIC ANEURYSM (AAA) WITHOUT RUPTURE (HCC): Primary | ICD-10-CM

## 2021-07-27 ENCOUNTER — APPOINTMENT (OUTPATIENT)
Dept: PREADMISSION TESTING | Facility: HOSPITAL | Age: 86
End: 2021-07-27

## 2021-08-24 DIAGNOSIS — I10 ESSENTIAL HYPERTENSION: ICD-10-CM

## 2021-08-24 RX ORDER — AMLODIPINE BESYLATE 2.5 MG/1
TABLET ORAL
Qty: 90 TABLET | Refills: 0 | Status: SHIPPED | OUTPATIENT
Start: 2021-08-24 | End: 2021-11-22

## 2021-09-09 DIAGNOSIS — F41.9 ANXIETY: ICD-10-CM

## 2021-09-09 RX ORDER — CLONAZEPAM 0.5 MG/1
TABLET ORAL
Qty: 60 TABLET | Refills: 2 | Status: SHIPPED | OUTPATIENT
Start: 2021-09-09

## 2021-09-20 ENCOUNTER — HOSPITAL ENCOUNTER (EMERGENCY)
Facility: HOSPITAL | Age: 86
Discharge: HOME | End: 2021-09-20
Attending: EMERGENCY MEDICINE | Admitting: EMERGENCY MEDICINE

## 2021-09-20 ENCOUNTER — APPOINTMENT (OUTPATIENT)
Dept: CT IMAGING | Facility: HOSPITAL | Age: 86
End: 2021-09-20

## 2021-09-20 ENCOUNTER — APPOINTMENT (OUTPATIENT)
Dept: GENERAL RADIOLOGY | Facility: HOSPITAL | Age: 86
End: 2021-09-20

## 2021-09-20 VITALS
DIASTOLIC BLOOD PRESSURE: 70 MMHG | OXYGEN SATURATION: 95 % | HEIGHT: 69 IN | RESPIRATION RATE: 16 BRPM | BODY MASS INDEX: 25.18 KG/M2 | HEART RATE: 67 BPM | TEMPERATURE: 97.8 F | SYSTOLIC BLOOD PRESSURE: 134 MMHG | WEIGHT: 170 LBS
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
## 2021-09-20 DIAGNOSIS — M54.16 LUMBAR RADICULOPATHY: ICD-10-CM

## 2021-09-20 DIAGNOSIS — S93.601A SPRAIN OF RIGHT FOOT, INITIAL ENCOUNTER: ICD-10-CM

## 2021-09-20 DIAGNOSIS — S93.401A SPRAIN OF RIGHT ANKLE, UNSPECIFIED LIGAMENT, INITIAL ENCOUNTER: ICD-10-CM

## 2021-09-20 PROCEDURE — 72192 CT PELVIS W/O DYE: CPT

## 2021-09-20 PROCEDURE — 73630 X-RAY EXAM OF FOOT: CPT

## 2021-09-20 PROCEDURE — 73610 X-RAY EXAM OF ANKLE: CPT

## 2021-09-20 PROCEDURE — 72131 CT LUMBAR SPINE W/O DYE: CPT

## 2021-09-20 PROCEDURE — 99283 EMERGENCY DEPT VISIT LOW MDM: CPT

## 2021-09-20 RX ORDER — ACETAMINOPHEN 325 MG/1
650 TABLET ORAL ONCE
Status: DISCONTINUED | OUTPATIENT
Start: 2021-09-20 | End: 2021-09-20 | Stop reason: HOSPADM

## 2021-09-20 RX ORDER — PREDNISONE 20 MG/1
TABLET ORAL
Qty: 10 TABLET | Refills: 0 | Status: SHIPPED | OUTPATIENT
Start: 2021-09-20 | End: 2021-09-30

## 2021-09-20 NOTE — ED PROVIDER NOTES
Subjective   Mr. Zimmer presents with right ankle and hip pain.  He tells me he has had right hip pain for couple of months.  Is been worse than usual lately.  Yesterday his right leg gave out when he tried to walk.  He fell to the ground and injured his right foot and ankle.  He notes chronic low back pain on both sides.  Is been worse than usual lately.  He has history of abdominal aortic aneurysm.  He has elected to observe it at this point.  The last measurement showed it was 5 cm.  He denies any abdominal pain.  He denies any recent illnesses.      History provided by:  Patient  Hip Pain  Location:  Right hip and right ankle  Quality:  Dull pain with weightbearing  Severity:  Severe  Onset quality: The ankle pain was sudden after falling yesterday.  The hip and back pain have been chronic.  Timing:  Constant  Progression:  Unchanged  Chronicity:  New  Context:  Ankle pain is from falling  Relieved by:  Nonweightbearing  Worsened by:  Weightbearing  Associated symptoms: no abdominal pain, no chest pain, no fever, no rhinorrhea, no shortness of breath and no vomiting    Ankle Pain  Associated symptoms: no fever        Review of Systems   Constitutional: Negative for fever.   HENT: Negative for rhinorrhea.    Respiratory: Negative for shortness of breath.    Cardiovascular: Negative for chest pain.   Gastrointestinal: Negative for abdominal pain and vomiting.       Past Medical History:   Diagnosis Date   • Arthritis    • Colon polyps    • COPD (chronic obstructive pulmonary disease) (CMS/HCC)    • Ecchymosis    • Elevated cholesterol    • GERD (gastroesophageal reflux disease)    • Heart attack (CMS/HCC)    • Hypertension    • Kidney stones    • Peripheral vascular disease (CMS/HCC)    • Wears glasses        Allergies   Allergen Reactions   • Quinine Derivatives Itching   • Penicillins Rash     Tolerates ceftriaxone       Past Surgical History:   Procedure Laterality Date   • BACK SURGERY      has a lump  removed/not cancer   • CARDIAC CATHETERIZATION      cardiac stent times one, patient states that 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   • COLONOSCOPY     • CORONARY STENT PLACEMENT     • CYSTOSCOPY TRANSURETHRAL RESECTION OF PROSTATE N/A 10/27/2020    Procedure: TRANSURETHRAL RESECTION OF PROSTATE;  Surgeon: Gabriel Echevarria MD;  [facility];  Service: Urology;  Laterality: N/A;   • ROTATOR CUFF REPAIR Right    • SINUS SURGERY     • TONSILLECTOMY         Family History   Problem Relation Age of Onset   • Cancer Other    • Heart attack Other    • Hypertension Other    • Hyperlipidemia Other    • Stroke Mother    • Colon cancer Father    • Heart attack Father    • Hypertension Father        Social History     Socioeconomic History   • Marital status:      Spouse name: Not on file   • Number of children: 2   • Years of education: Not on file   • Highest education level: Not on file   Tobacco Use   • Smoking status: Former Smoker     Packs/day: 1.00     Years: 30.00     Pack years: 30.00     Types: Cigarettes     Quit date:      Years since quittin   • Smokeless tobacco: Never Used   Vaping Use   • Vaping Use: Never used   Substance and Sexual Activity   • Alcohol use: Never   • Drug use: Never   • Sexual activity: Defer           Objective   Physical Exam  Vitals and nursing note reviewed.   Constitutional:       General: He is not in acute distress.     Appearance: Normal appearance.   HENT:      Head: Normocephalic and atraumatic.      Mouth/Throat:      Pharynx: No posterior oropharyngeal erythema.   Eyes:      General: No scleral icterus.     Conjunctiva/sclera: Conjunctivae normal.   Neck:      Comments: No JVD  Cardiovascular:      Rate and Rhythm: Normal rate and regular rhythm.      Heart sounds: No murmur heard.   No friction rub.   Pulmonary:      Effort: Pulmonary effort is normal. No respiratory distress.      Breath sounds: Normal breath  sounds. No wheezing or rales.   Abdominal:      General: Bowel sounds are normal.      Palpations: Abdomen is soft.      Tenderness: There is no abdominal tenderness. There is no guarding or rebound.   Musculoskeletal:         General: Swelling and tenderness present.      Cervical back: Normal range of motion and neck supple.      Right lower leg: No edema.      Left lower leg: No edema.      Comments: He is tender over the lateral ankle.  He also is tender over the top of his foot where there is swelling and bruising.  There is swelling over the lateral ankle as well.  I can flex his hip and he has only minimal pain.  He indicates most of his pain is in the low back area bilaterally at about the LS junction level.   Skin:     General: Skin is warm and dry.      Capillary Refill: Capillary refill takes less than 2 seconds.      Coloration: Skin is not pale.      Findings: Bruising present.      Comments: He has bruising over the top of his foot   Neurological:      General: No focal deficit present.      Mental Status: He is alert and oriented to person, place, and time.      Coordination: Coordination normal.   Psychiatric:         Mood and Affect: Mood normal.         Behavior: Behavior normal.         Thought Content: Thought content normal.         Procedures           ED Course  ED Course as of Sep 20 1620   Mon Sep 20, 2021   1415 X-rays and CTs show no change and his aneurysm and no acute fracture. He has degenerative findings throughout his lumbar spine.    [DT]   1423 I spoke with Mr. Zimmer and his wife about findings.  He wants to try crutches.  We will fit him for those.  I recommended he also get a walker on the way home as that may be easier for him.  I have advised him to minimize weightbearing on his foot over the next couple of days.  I feel that his leg giving out on him could be from radiculopathy so we will start him on prednisone.    [DT]      ED Course User Index  [DT] Bernardino Simms MD                                            MDM    Final diagnoses:   Fall in home, initial encounter   Sprain of right ankle, unspecified ligament, initial encounter   Sprain of right foot, initial encounter   Lumbar radiculopathy       ED Disposition  ED Disposition     ED Disposition Condition Comment    Discharge Stable           Juan Macias MD  [address]  [facility] [address]  [place] [id number] [phone]               Medication List      Changed    predniSONE 20 MG tablet  Commonly known as: DELTASONE  2 pills daily for 5 days  What changed:   · medication strength  · additional instructions        Stop    rosuvastatin 20 MG tablet  Commonly known as: CRESTOR     sulfaSALAzine 500 MG tablet  Commonly known as: AZULFIDINE     Tudorza Pressair 400 MCG/ACT aerosol powder  powder for inhalation  Generic drug: aclidinium bromide     zolpidem CR 12.5 MG CR tablet  Commonly known as: AMBIEN CR           Where to Get Your Medications      These medications were sent to [facility] [place], [place] - [address]  - [phone]  - [phone] FX  [address], [place] [id number]    Phone: [phone]   · predniSONE 20 MG tablet          Bernardino Simms MD  09/20/21 9447

## 2021-09-20 NOTE — DISCHARGE INSTRUCTIONS
Elevate your right leg is much as possible over the next couple of days.  Apply ice to it intermittently.  Minimize weightbearing on that foot with either crutches or a walker.  Call your primary care provider to arrange follow-up.

## 2021-09-21 ENCOUNTER — PATIENT OUTREACH (OUTPATIENT)
Dept: CASE MANAGEMENT | Facility: OTHER | Age: 86
End: 2021-09-21

## 2021-09-21 NOTE — OUTREACH NOTE
"Ambulatory Case Management Note    Patient Outreach    Pt contacted regarding [facility] ED 9/20/21 with chief c/o listed hip pain and ankle pain.  States \"I'm doing much better.\"  He is up and about using crutches  Asked about walker that was recommended at ER, which he states \"they left it up to me and I am mastering the crutches.\"  He is trying to keep leg elevated and using ice packs. Offered to schedule f/u visit with his PCP, however he declines at present, but states if he needs appt, he will call.   He lives with his spouse, but states he is self sufficient.  He does drive short distances, but mostly his wife is his transportation. He does monitor his bp occas at home.  Encouraged to monitor for next couple days, to make sure doing OK since was listed as elevated in ER.  Care gaps reviewed and will discuss needed vaccines at his [facility] that is scheduled for Oct. He does have living will.  Encouraged to bring copy to PCP office so that can be scanned into chart.  He denies any needs and voiced his appreciation for the call and wanted to convey how wonderful the staff at ED was yest.  Role of  explained and contact number given.  [facility] 24/7 Nurse line explained and contact number provided.       SDOH updated and reviewed with the patient during this program:     Financial Resource Strain: Low Risk    • Difficulty of Paying Living Expenses: Not hard at all       Food Insecurity: No Food Insecurity   • Worried About Running Out of Food in the Last Year: Never true   • Ran Out of Food in the Last Year: Never true       Transportation Needs: No Transportation Needs   • Lack of Transportation (Medical): No   • Lack of Transportation (Non-Medical): No       Housing Stability: Low Risk    • Unable to Pay for Housing in the Last Year: No   • Number of Places Lived in the Last Year: 1   • Unstable Housing in the Last Year: No     General & Health Literacy Assessment    Questions/Answers      Most Recent Value "   Assessment Completed With  Patient   Living Arrangement  Spouse   Type of Residence  Private Residence   Equiptment Used at Home  -- [bp monitor]   Communication Device  No   Bed or Wheelchair Confined  No   Difficulty Keeping Appointments  No   Health Literacy  Good        Care Evaluation    Questions/Answers      Most Recent Value   Annual Wellness Visit:   Patient Has Completed   Care Gaps Addressed  Flu Shot, Pneumonia Vaccine   Flu Shot Status  Patient will Complete   Pneumonia Vaccine Status  Up to Date   Other Patient Education/Resources   24/7 [facility] Nurse Call Line, Advanced Care Planning, MyChart   24/7 Nurse Call Line Education Method  Verbal   ACP Education Method  Verbal   MyChart Education Method  Verbal   Advanced Directives:  Patient Has   Medication Adherence  Medications understood   Healthy Lifestyle (Self-Efficacy)  recognizes when to contact medical assistance, self-reports important symptoms to medical professional        Dayanna Pop RN  Ambulatory Case Management    9/21/2021, 12:41 EDT

## 2021-09-30 ENCOUNTER — TELEPHONE (OUTPATIENT)
Dept: INTERNAL MEDICINE | Facility: CLINIC | Age: 86
End: 2021-09-30

## 2021-09-30 RX ORDER — PREDNISONE 10 MG/1
TABLET ORAL
Qty: 48 EACH | Refills: 0 | Status: SHIPPED | OUTPATIENT
Start: 2021-09-30 | End: 2022-01-01

## 2021-09-30 NOTE — TELEPHONE ENCOUNTER
Caller: Rylan Zimmer    Relationship to patient: Self    Best call back number: [phone]    What is the call regarding:  PATIENT STATES THAT HE WENT TO THE EMERGENCY ROOM ONE WEEK AGO FOR A SPRAINED ANKLE.  THEY GAVE HIM PREDNISE AND HE IS NEEDING SOME MORE AND WOULD LIKE TO SEE IF HE COULD THAT CALLED IN TO [facility].

## 2021-10-18 DIAGNOSIS — E78.49 OTHER HYPERLIPIDEMIA: ICD-10-CM

## 2021-10-18 RX ORDER — EZETIMIBE 10 MG/1
TABLET ORAL
Qty: 90 TABLET | Refills: 0 | Status: SHIPPED | OUTPATIENT
Start: 2021-10-18 | End: 2022-01-01

## 2021-10-18 RX ORDER — ARIPIPRAZOLE 2 MG/1
TABLET ORAL
Qty: 60 TABLET | Refills: 0 | Status: SHIPPED | OUTPATIENT
Start: 2021-10-18 | End: 2021-10-25

## 2021-10-19 DIAGNOSIS — M25.571 ACUTE RIGHT ANKLE PAIN: Primary | ICD-10-CM

## 2021-10-19 RX ORDER — PREDNISONE 10 MG/1
TABLET ORAL
Qty: 48 EACH | Refills: 0 | OUTPATIENT
Start: 2021-10-19

## 2021-10-19 NOTE — TELEPHONE ENCOUNTER
Caller: Rylan Zimmer    Relationship: Self      Medication requested (name and dosage): predniSONE (DELTASONE) 10 MG (48) dose pack    Requested Prescriptions:   Requested Prescriptions     Pending Prescriptions Disp Refills   • predniSONE (DELTASONE) 10 MG (48) dose pack 48 each 0     Sig: As directed        Pharmacy where request should be sent: [facility]     Additional details provided by patient: SWELLING DOWN TO 40% OF WHAT IT WAS.  PATIENT STATED THAT IT IS STILL PIANFUL     Best call back number: [phone]    Does the patient have less than a 3 day supply:  [x] Yes  [] No    Angus De Santiago Rep   10/19/21 12:01 EDT

## 2021-10-25 RX ORDER — ARIPIPRAZOLE 2 MG/1
TABLET ORAL
Qty: 60 TABLET | Refills: 6 | Status: SHIPPED | OUTPATIENT
Start: 2021-10-25 | End: 2021-10-25

## 2021-10-25 RX ORDER — ARIPIPRAZOLE 2 MG/1
TABLET ORAL
Qty: 60 TABLET | Refills: 0 | Status: SHIPPED | OUTPATIENT
Start: 2021-10-25 | End: 2021-10-26

## 2021-10-25 NOTE — TELEPHONE ENCOUNTER
's office called and stated pt had an appt today,but called and canceled because his foot feels better

## 2021-10-26 ENCOUNTER — OFFICE VISIT (OUTPATIENT)
Dept: INTERNAL MEDICINE | Facility: CLINIC | Age: 86
End: 2021-10-26

## 2021-10-26 VITALS
SYSTOLIC BLOOD PRESSURE: 140 MMHG | TEMPERATURE: 96.8 F | OXYGEN SATURATION: 94 % | DIASTOLIC BLOOD PRESSURE: 60 MMHG | WEIGHT: 167 LBS | HEART RATE: 72 BPM | HEIGHT: 69 IN | BODY MASS INDEX: 24.73 KG/M2

## 2021-10-26 DIAGNOSIS — Z00.00 ROUTINE GENERAL MEDICAL EXAMINATION AT A HEALTH CARE FACILITY: ICD-10-CM

## 2021-10-26 DIAGNOSIS — G47.00 INSOMNIA, UNSPECIFIED TYPE: ICD-10-CM

## 2021-10-26 DIAGNOSIS — J43.8 OTHER EMPHYSEMA (HCC): ICD-10-CM

## 2021-10-26 DIAGNOSIS — N40.0 ENLARGED PROSTATE WITHOUT LOWER URINARY TRACT SYMPTOMS (LUTS): ICD-10-CM

## 2021-10-26 DIAGNOSIS — Z00.00 ENCOUNTER FOR MEDICARE ANNUAL WELLNESS EXAM: ICD-10-CM

## 2021-10-26 DIAGNOSIS — I10 PRIMARY HYPERTENSION: Primary | ICD-10-CM

## 2021-10-26 DIAGNOSIS — N18.30 STAGE 3 CHRONIC KIDNEY DISEASE, UNSPECIFIED WHETHER STAGE 3A OR 3B CKD (HCC): ICD-10-CM

## 2021-10-26 DIAGNOSIS — I25.10 ATHEROSCLEROSIS OF NATIVE CORONARY ARTERY OF NATIVE HEART WITHOUT ANGINA PECTORIS: ICD-10-CM

## 2021-10-26 DIAGNOSIS — K57.30 DIVERTICULOSIS OF LARGE INTESTINE WITHOUT HEMORRHAGE: ICD-10-CM

## 2021-10-26 DIAGNOSIS — G62.9 NEUROPATHY: ICD-10-CM

## 2021-10-26 DIAGNOSIS — E78.49 OTHER HYPERLIPIDEMIA: ICD-10-CM

## 2021-10-26 DIAGNOSIS — Z23 NEED FOR INFLUENZA VACCINATION: ICD-10-CM

## 2021-10-26 DIAGNOSIS — D36.9 TUBULAR ADENOMA: ICD-10-CM

## 2021-10-26 PROCEDURE — 1126F AMNT PAIN NOTED NONE PRSNT: CPT | Performed by: INTERNAL MEDICINE

## 2021-10-26 PROCEDURE — 96160 PT-FOCUSED HLTH RISK ASSMT: CPT | Performed by: INTERNAL MEDICINE

## 2021-10-26 PROCEDURE — 1160F RVW MEDS BY RX/DR IN RCRD: CPT | Performed by: INTERNAL MEDICINE

## 2021-10-26 PROCEDURE — 90662 IIV NO PRSV INCREASED AG IM: CPT | Performed by: INTERNAL MEDICINE

## 2021-10-26 PROCEDURE — 1170F FXNL STATUS ASSESSED: CPT | Performed by: INTERNAL MEDICINE

## 2021-10-26 PROCEDURE — G0008 ADMIN INFLUENZA VIRUS VAC: HCPCS | Performed by: INTERNAL MEDICINE

## 2021-10-26 PROCEDURE — G0439 PPPS, SUBSEQ VISIT: HCPCS | Performed by: INTERNAL MEDICINE

## 2021-10-26 NOTE — PROGRESS NOTES
The ABCs of the Annual Wellness Visit  Subsequent Medicare Wellness Visit    Chief Complaint   Patient presents with   • Annual Exam      Subjective       History of Present Illness:  Rylan Zimmer is a 85 y.o. male who presents for a Subsequent Medicare Wellness Visit.    The following portions of the patient's history were reviewed and   updated as appropriate: current medications, past family history, past medical history, past social history, past surgical history and problem list.       Compared to one year ago, the patient feels his physical   health is the same.    Compared to one year ago, the patient feels his mental   health is the same.    Recent Hospitalizations:  This patient has had a [facility] admission record on file within the last 365 days.    Current Medical Providers:  Patient Care Team:  Juan Macias MD as PCP - General  Juan Macias MD as PCP - Family Medicine    Outpatient Medications Prior to Visit   Medication Sig Dispense Refill   • amLODIPine (NORVASC) 2.5 MG tablet TAKE ONE TABLET BY MOUTH EVERY NIGHT 90 tablet 0   • aspirin 81 MG tablet Take 81 mg by mouth Daily.     • carvedilol (COREG) 12.5 MG tablet Take 12.5 mg by mouth 2 (Two) Times a Day.  0   • clonazePAM (KlonoPIN) 0.5 MG tablet TAKE 1 TO 2 TABLETS BY MOUTH AT BEDTIME 60 tablet 2   • diclofenac (VOLTAREN) 1 % gel gel APPLY GEL TOPICALLY TO APPROPRIATE AREA AS DIRECTED 100 g 2   • Diclofenac Sodium (VOLTAREN) 1 % gel gel APPLY TO AFFECTED AREA 2 TIMES A  g 5   • docusate sodium (Colace) 100 MG capsule Take 1 capsule by mouth 2 (Two) Times a Day. If taking percocet 15 capsule 1   • DULoxetine (CYMBALTA) 60 MG capsule TAKE ONE CAPSULE BY MOUTH EVERY DAY 90 capsule 3   • ezetimibe (ZETIA) 10 MG tablet TAKE ONE TABLET BY MOUTH EVERY DAY **CONTINUE CRESTOR 90 tablet 0   • Fluticasone-Umeclidin-Vilant (Trelegy Ellipta) 200-62.5-25 MCG/INH aerosol powder  Inhale 1 puff Every Morning. 1 each 5   •  paricalcitol (ZEMPLAR) 1 MCG capsule Monday, Wednesday and Friday  0   • predniSONE (DELTASONE) 10 MG (48) dose pack As directed 48 each 0   • ARIPiprazole (ABILIFY) 2 MG tablet TAKE ONE TABLET BY MOUTH EVERY DAY 60 tablet 0     No facility-administered medications prior to visit.       No opioid medication identified on active medication list. I have reviewed chart for other potential  high risk medication/s and harmful drug interactions in the elderly.          Aspirin is on active medication list. Aspirin use is indicated based on review of current medical condition/s. Pros and cons of this therapy have been discussed today. Benefits of this medication outweigh potential harm.  Patient has been encouraged to continue taking this medication.  .        Fall Risk Assessment was completed, and patient is at moderate risk for falls.      Patient Active Problem List   Diagnosis   • Anxiety   • Aortic aneurysm (HCC)   • Atherosclerotic heart disease of native coronary artery without angina pectoris   • Chronic obstructive pulmonary disease (HCC)   • Chronic kidney disease, stage III (moderate) ([facility])   • Diverticulosis of large intestine   • Enlarged prostate without lower urinary tract symptoms (luts)   • Generalized osteoarthritis   • Hyperlipidemia   • Hypertension   • Insomnia   • Neuropathy   • PVD (peripheral vascular disease) ([facility])   • Tubular adenoma   • Absence of kidney   • Arthralgia of hip   • Lower urinary tract symptoms (LUTS)   • Routine general medical examination at a health care facility   • Multiple falls   • Acute UTI (urinary tract infection)   • Abdominal aortic aneurysm (AAA) without rupture ([facility])     Advance Care Planning   Advance Directive is not on file.  ACP discussion was held with the patient during this visit. Patient does not have an advance directive, information provided.    Review of Systems   Constitutional: Negative for chills, diaphoresis, fever and unexpected weight change.   HENT:  "Negative for congestion, ear pain, hearing loss, nosebleeds, postnasal drip, sinus pressure and sore throat.    Eyes: Negative for pain, discharge and itching.   Respiratory: Positive for shortness of breath. Negative for cough, chest tightness and wheezing.    Cardiovascular: Negative for chest pain, palpitations and leg swelling.   Gastrointestinal: Negative for abdominal distention, abdominal pain, blood in stool, constipation, diarrhea, nausea and vomiting.        5/15 colonoscopy with TA  7/18 \" \"   Endocrine: Negative for polydipsia and polyuria.   Genitourinary: Negative for difficulty urinating, dysuria, frequency and hematuria.        Followed by urology   Musculoskeletal: Positive for arthralgias (in hips) and back pain. Negative for gait problem, joint swelling and myalgias.   Skin: Negative for rash and wound.   Neurological: Negative for dizziness, syncope and headaches.   Psychiatric/Behavioral: Positive for sleep disturbance. Negative for dysphoric mood. The patient is not nervous/anxious.          Objective       Vitals:    10/26/21 1302 10/26/21 1323   BP: 120/64 140/60   BP Location: Left arm    Patient Position: Sitting    Pulse: 72    Temp: 96.8 °F (36 °C)    TempSrc: Infrared    SpO2: 94%    Weight: 75.8 kg (167 lb)    Height: 175.3 cm (69.02\")    PainSc: 0-No pain      BMI Readings from Last 1 Encounters:   10/26/21 24.65 kg/m²   BMI is above normal parameters. Recommendations include: exercise counseling and nutrition counseling    Does the patient have evidence of cognitive impairment? No    Physical Exam  Constitutional:       Appearance: Normal appearance. He is well-developed.   HENT:      Head: Normocephalic and atraumatic.      Right Ear: External ear normal.      Left Ear: External ear normal.      Nose: Nose normal.      Mouth/Throat:      Mouth: Mucous membranes are moist.      Pharynx: Oropharynx is clear.   Eyes:      Extraocular Movements: Extraocular movements intact.      " Conjunctiva/sclera: Conjunctivae normal.      Pupils: Pupils are equal, round, and reactive to light.   Cardiovascular:      Rate and Rhythm: Normal rate and regular rhythm.      Heart sounds: Normal heart sounds.   Pulmonary:      Effort: Pulmonary effort is normal.      Comments: Diminished BS  Abdominal:      General: Bowel sounds are normal.      Palpations: Abdomen is soft.   Musculoskeletal:         General: Normal range of motion.      Cervical back: Normal range of motion and neck supple.   Lymphadenopathy:      Cervical: No cervical adenopathy.   Skin:     General: Skin is warm and dry.   Neurological:      General: No focal deficit present.      Mental Status: He is alert and oriented to person, place, and time.   Psychiatric:         Mood and Affect: Mood normal.         Behavior: Behavior normal.         Thought Content: Thought content normal.                 HEALTH RISK ASSESSMENT    Smoking Status:  Social History     Tobacco Use   Smoking Status Former Smoker   • Packs/day: 1.00   • Years: 30.00   • Pack years: 30.00   • Types: Cigarettes   • Quit date:    • Years since quittin.8   Smokeless Tobacco Never Used     Alcohol Consumption:  Social History     Substance and Sexual Activity   Alcohol Use Never     Fall Risk Screen:    [facility], and patient is at MODERATE risk for falls. Assessment completed on:10/26/2021    Depression Screening:  PHQ-2/PHQ-9 Depression Screening 10/26/2021   Little interest or pleasure in doing things 0   Feeling down, depressed, or hopeless 0   Trouble falling or staying asleep, or sleeping too much 0   Feeling tired or having little energy 0   Poor appetite or overeating 0   Feeling bad about yourself - or that you are a failure or have let yourself or your family down 0   Trouble concentrating on things, such as reading the newspaper or watching television 0   Moving or speaking so slowly that other people could have noticed. Or  the opposite - being so fidgety or restless that you have been moving around a lot more than usual 0   Thoughts that you would be better off dead, or of hurting yourself in some way 0   Total Score 0   If you checked off any problems, how difficult have these problems made it for you to do your work, take care of things at home, or get along with other people? -       Health Habits and Functional and Cognitive Screening:  Functional & Cognitive Status 10/26/2021   Do you have difficulty preparing food and eating? No   Do you have difficulty bathing yourself, getting dressed or grooming yourself? No   Do you have difficulty using the toilet? No   Do you have difficulty moving around from place to place? No   Do you have trouble with steps or getting out of a bed or a chair? No   Current Diet Well Balanced Diet   Dental Exam Up to date   Eye Exam Up to date   Exercise (times per week) 0 times per week   Current Exercises Include -   Do you need help using the phone?  No   Are you deaf or do you have serious difficulty hearing?  No   Do you need help with transportation? No   Do you need help shopping? No   Do you need help preparing meals?  No   Do you need help with housework?  No   Do you need help with laundry? No   Do you need help taking your medications? No   Do you need help managing money? No   Do you ever drive or ride in a car without wearing a seat belt? Yes   Have you felt unusual stress, anger or loneliness in the last month? No   Who do you live with? Spouse   If you need help, do you have trouble finding someone available to you? No   Have you been bothered in the last four weeks by sexual problems? No   Do you have difficulty concentrating, remembering or making decisions? No       Age-appropriate Screening Schedule:  Refer to the list below for future screening recommendations based on patient's age, sex and/or medical conditions. Orders for these recommended tests are listed in the plan section. The  patient has been provided with a written plan.    Health Maintenance   Topic Date Due   • TDAP/TD VACCINES (1 - Tdap) Never done   • ZOSTER VACCINE (1 of 2) Never done   • INFLUENZA VACCINE  08/01/2021   • LIPID PANEL  10/12/2021              Assessment/Plan     CMS Preventative Services Quick Reference  Risk Factors Identified During Encounter  Immunizations Discussed/Encouraged (specific Immunizations; Td, Hepatitis A Vaccine/Series, Influenza, Shingrix and COVID19  Inactivity/Sedentary  Obesity/Overweight   Polypharmacy  The above risks/problems have been discussed with the patient.  Follow up actions/plans if indicated are seen below in the Assessment/Plan Section.  Pertinent information has been shared with the patient in the After Visit Summary.    Diagnoses and all orders for this visit:    1. Primary hypertension (Primary)    2. Other hyperlipidemia    3. Atherosclerosis of native coronary artery of native heart without angina pectoris    4. Enlarged prostate without lower urinary tract symptoms (luts)    5. Stage 3 chronic kidney disease, unspecified whether stage 3a or 3b CKD (HCC)    6. Routine general medical examination at a health care facility    7. Tubular adenoma    8. Neuropathy    9. Other emphysema (HCC)    10. Insomnia, unspecified type    11. Encounter for Medicare annual wellness exam    12. Need for influenza vaccination  -     Fluzone High-Dose 65+yrs (8861-8609)    13. Diverticulosis of large intestine without hemorrhage        Follow Up:   Return in about 4 months (around 2/26/2022) for Recheck, with fasting labs.     An After Visit Summary and PPPS were given to the patient.            HME-counseled on diet and exercise, fasting labs soon  htn-controlled on CCB and BB, goal of 150/80  Cri-labs soon, advised adequate hydration and NSAID avoidance  copd-improved on Trelegy  hyperlipidemia-labs soon on Crestor/Zetia, counseled on diet and wt loss  bph-Urology f/u , s/p  TURP  diverticulosis-citrucel qd , asymptomatic  TA-colonoscopy noted, needs scope 7/23  cad/pvd-cont rf mod, asymptomatic  insomnia-cont klonopine qhs prn, advised of risk  djd/neuropathy-cont cymbalta and gabapentin, f/u Dr Garduno, stable for the most part  Diverticulosis-counseled on increased fiber      10/12 labs noted and dw patient    Reviewed the following with the patient: advised patient to avoid alcoholic beverages, encouraged patient to exercise 5-7 days per week for 30 minutes at a time, ideal body weight discussed with patient and weight loss encouraged.

## 2021-10-28 RX ORDER — PREDNISONE 10 MG/1
TABLET ORAL
Qty: 48 EACH | Refills: 0 | OUTPATIENT
Start: 2021-10-28

## 2021-10-28 NOTE — TELEPHONE ENCOUNTER
Caller: Asael Zimmer    Relationship: Self      Medication requested (name and dosage):     Requested Prescriptions:   Requested Prescriptions     Pending Prescriptions Disp Refills   • predniSONE (DELTASONE) 10 MG (48) dose pack 48 each 0     Sig: As directed        Pharmacy where request should be sent:   [facility] [address] [place] [address] - [phone]  - [phone]      Additional details provided by patient: ASAEL IS OUT OF PREDNISONE.  HE STOPPED TAKIING IT AS HE FELT BETTER.  NOW HE ISN'T.    Best call back number: [phone]    Does the patient have less than a 3 day supply:  [x] Yes  [] No    Angus Valencia Rep   10/28/21 14:14 EDT      PLEASE CALL HIM THEN THIS IS SENT IN.

## 2021-11-15 DIAGNOSIS — J43.8 OTHER EMPHYSEMA (HCC): ICD-10-CM

## 2021-11-20 DIAGNOSIS — I10 ESSENTIAL HYPERTENSION: ICD-10-CM

## 2021-11-22 RX ORDER — AMLODIPINE BESYLATE 2.5 MG/1
TABLET ORAL
Qty: 90 TABLET | Refills: 0 | Status: SHIPPED | OUTPATIENT
Start: 2021-11-22 | End: 2022-01-01

## 2021-12-13 DIAGNOSIS — J43.8 OTHER EMPHYSEMA (HCC): ICD-10-CM

## 2022-01-01 ENCOUNTER — OFFICE VISIT (OUTPATIENT)
Dept: INTERNAL MEDICINE | Facility: CLINIC | Age: 87
End: 2022-01-01

## 2022-01-01 ENCOUNTER — HOSPITAL ENCOUNTER (EMERGENCY)
Facility: HOSPITAL | Age: 87
Discharge: HOME | End: 2022-06-29
Attending: EMERGENCY MEDICINE | Admitting: EMERGENCY MEDICINE

## 2022-01-01 ENCOUNTER — READMISSION MANAGEMENT (OUTPATIENT)
Dept: CALL CENTER | Facility: HOSPITAL | Age: 87
End: 2022-01-01

## 2022-01-01 ENCOUNTER — TELEPHONE (OUTPATIENT)
Dept: INTERNAL MEDICINE | Facility: CLINIC | Age: 87
End: 2022-01-01

## 2022-01-01 ENCOUNTER — TRANSITIONAL CARE MANAGEMENT TELEPHONE ENCOUNTER (OUTPATIENT)
Dept: CALL CENTER | Facility: HOSPITAL | Age: 87
End: 2022-01-01

## 2022-01-01 ENCOUNTER — LAB (OUTPATIENT)
Dept: LAB | Facility: HOSPITAL | Age: 87
End: 2022-01-01

## 2022-01-01 ENCOUNTER — PRIOR AUTHORIZATION (OUTPATIENT)
Dept: INTERNAL MEDICINE | Facility: CLINIC | Age: 87
End: 2022-01-01

## 2022-01-01 VITALS
WEIGHT: 160 LBS | RESPIRATION RATE: 18 BRPM | DIASTOLIC BLOOD PRESSURE: 66 MMHG | SYSTOLIC BLOOD PRESSURE: 126 MMHG | BODY MASS INDEX: 23.7 KG/M2 | OXYGEN SATURATION: 98 % | TEMPERATURE: 97.8 F | HEIGHT: 69 IN | HEART RATE: 59 BPM

## 2022-01-01 VITALS
TEMPERATURE: 96.8 F | HEART RATE: 68 BPM | WEIGHT: 180 LBS | BODY MASS INDEX: 26.66 KG/M2 | DIASTOLIC BLOOD PRESSURE: 70 MMHG | HEIGHT: 69 IN | SYSTOLIC BLOOD PRESSURE: 140 MMHG | OXYGEN SATURATION: 98 %

## 2022-01-01 VITALS
OXYGEN SATURATION: 98 % | HEART RATE: 60 BPM | HEIGHT: 69 IN | TEMPERATURE: 96.9 F | DIASTOLIC BLOOD PRESSURE: 66 MMHG | WEIGHT: 172.2 LBS | BODY MASS INDEX: 25.51 KG/M2 | SYSTOLIC BLOOD PRESSURE: 130 MMHG

## 2022-01-01 VITALS
HEART RATE: 62 BPM | SYSTOLIC BLOOD PRESSURE: 130 MMHG | TEMPERATURE: 96.9 F | BODY MASS INDEX: 25.03 KG/M2 | WEIGHT: 169 LBS | HEIGHT: 69 IN | DIASTOLIC BLOOD PRESSURE: 70 MMHG | OXYGEN SATURATION: 99 %

## 2022-01-01 DIAGNOSIS — J43.8 OTHER EMPHYSEMA: ICD-10-CM

## 2022-01-01 DIAGNOSIS — I10 PRIMARY HYPERTENSION: Primary | ICD-10-CM

## 2022-01-01 DIAGNOSIS — I25.10 ATHEROSCLEROSIS OF NATIVE CORONARY ARTERY OF NATIVE HEART WITHOUT ANGINA PECTORIS: Primary | ICD-10-CM

## 2022-01-01 DIAGNOSIS — N18.30 STAGE 3 CHRONIC KIDNEY DISEASE, UNSPECIFIED WHETHER STAGE 3A OR 3B CKD: ICD-10-CM

## 2022-01-01 DIAGNOSIS — I10 PRIMARY HYPERTENSION: ICD-10-CM

## 2022-01-01 DIAGNOSIS — E78.49 OTHER HYPERLIPIDEMIA: ICD-10-CM

## 2022-01-01 DIAGNOSIS — G47.00 INSOMNIA, UNSPECIFIED TYPE: ICD-10-CM

## 2022-01-01 DIAGNOSIS — N40.0 ENLARGED PROSTATE WITHOUT LOWER URINARY TRACT SYMPTOMS (LUTS): ICD-10-CM

## 2022-01-01 DIAGNOSIS — K57.30 DIVERTICULOSIS OF LARGE INTESTINE WITHOUT HEMORRHAGE: ICD-10-CM

## 2022-01-01 DIAGNOSIS — Z23 NEED FOR INFLUENZA VACCINATION: ICD-10-CM

## 2022-01-01 DIAGNOSIS — I10 ESSENTIAL HYPERTENSION: ICD-10-CM

## 2022-01-01 DIAGNOSIS — D36.9 TUBULAR ADENOMA: ICD-10-CM

## 2022-01-01 DIAGNOSIS — N18.9 CHRONIC KIDNEY DISEASE, UNSPECIFIED CKD STAGE: Primary | ICD-10-CM

## 2022-01-01 DIAGNOSIS — I73.9 PVD (PERIPHERAL VASCULAR DISEASE): ICD-10-CM

## 2022-01-01 DIAGNOSIS — E78.49 OTHER HYPERLIPIDEMIA: Primary | ICD-10-CM

## 2022-01-01 DIAGNOSIS — M16.11 PRIMARY OSTEOARTHRITIS OF RIGHT HIP: Primary | ICD-10-CM

## 2022-01-01 DIAGNOSIS — M15.9 PRIMARY OSTEOARTHRITIS INVOLVING MULTIPLE JOINTS: Primary | ICD-10-CM

## 2022-01-01 DIAGNOSIS — G47.00 INSOMNIA, UNSPECIFIED TYPE: Primary | ICD-10-CM

## 2022-01-01 LAB
ALBUMIN SERPL-MCNC: 4.2 G/DL (ref 3.5–5.2)
ALBUMIN SERPL-MCNC: 4.7 G/DL (ref 3.5–5.2)
ALBUMIN/GLOB SERPL: 1.5 G/DL
ALBUMIN/GLOB SERPL: 1.7 G/DL
ALP SERPL-CCNC: 68 U/L (ref 39–117)
ALP SERPL-CCNC: 84 U/L (ref 39–117)
ALT SERPL W P-5'-P-CCNC: 14 U/L (ref 1–41)
ALT SERPL W P-5'-P-CCNC: 17 U/L (ref 1–41)
ANION GAP SERPL CALCULATED.3IONS-SCNC: 10 MMOL/L (ref 5–15)
ANION GAP SERPL CALCULATED.3IONS-SCNC: 15.1 MMOL/L (ref 5–15)
AST SERPL-CCNC: 17 U/L (ref 1–40)
AST SERPL-CCNC: 18 U/L (ref 1–40)
BASOPHILS # BLD AUTO: 0.05 10*3/MM3 (ref 0–0.2)
BASOPHILS NFR BLD AUTO: 0.6 % (ref 0–1.5)
BILIRUB SERPL-MCNC: 0.5 MG/DL (ref 0–1.2)
BILIRUB SERPL-MCNC: 0.5 MG/DL (ref 0–1.2)
BUN SERPL-MCNC: 37 MG/DL (ref 8–23)
BUN SERPL-MCNC: 38 MG/DL (ref 8–23)
BUN/CREAT SERPL: 18.1 (ref 7–25)
BUN/CREAT SERPL: 18.4 (ref 7–25)
CALCIUM SPEC-SCNC: 8.8 MG/DL (ref 8.6–10.5)
CALCIUM SPEC-SCNC: 9.2 MG/DL (ref 8.6–10.5)
CHLORIDE SERPL-SCNC: 105 MMOL/L (ref 98–107)
CHLORIDE SERPL-SCNC: 110 MMOL/L (ref 98–107)
CHOLEST SERPL-MCNC: 119 MG/DL (ref 0–200)
CO2 SERPL-SCNC: 17.9 MMOL/L (ref 22–29)
CO2 SERPL-SCNC: 23 MMOL/L (ref 22–29)
CREAT SERPL-MCNC: 2.04 MG/DL (ref 0.76–1.27)
CREAT SERPL-MCNC: 2.07 MG/DL (ref 0.76–1.27)
DEPRECATED RDW RBC AUTO: 45 FL (ref 37–54)
DEPRECATED RDW RBC AUTO: 48.5 FL (ref 37–54)
EGFRCR SERPLBLD CKD-EPI 2021: 30.8 ML/MIN/1.73
EGFRCR SERPLBLD CKD-EPI 2021: 31.3 ML/MIN/1.73
EOSINOPHIL # BLD AUTO: 0.27 10*3/MM3 (ref 0–0.4)
EOSINOPHIL NFR BLD AUTO: 3.1 % (ref 0.3–6.2)
ERYTHROCYTE [DISTWIDTH] IN BLOOD BY AUTOMATED COUNT: 13.4 % (ref 12.3–15.4)
ERYTHROCYTE [DISTWIDTH] IN BLOOD BY AUTOMATED COUNT: 13.9 % (ref 12.3–15.4)
GLOBULIN UR ELPH-MCNC: 2.7 GM/DL
GLOBULIN UR ELPH-MCNC: 2.8 GM/DL
GLUCOSE SERPL-MCNC: 105 MG/DL (ref 65–99)
GLUCOSE SERPL-MCNC: 91 MG/DL (ref 65–99)
HCT VFR BLD AUTO: 38.1 % (ref 37.5–51)
HCT VFR BLD AUTO: 42.5 % (ref 37.5–51)
HDLC SERPL-MCNC: 38 MG/DL (ref 40–60)
HGB BLD-MCNC: 12.4 G/DL (ref 13–17.7)
HGB BLD-MCNC: 13.5 G/DL (ref 13–17.7)
HOLD SPECIMEN: NORMAL
IMM GRANULOCYTES # BLD AUTO: 0.05 10*3/MM3 (ref 0–0.05)
IMM GRANULOCYTES NFR BLD AUTO: 0.6 % (ref 0–0.5)
LDLC SERPL CALC-MCNC: 59 MG/DL (ref 0–100)
LDLC/HDLC SERPL: 1.51 {RATIO}
LYMPHOCYTES # BLD AUTO: 0.78 10*3/MM3 (ref 0.7–3.1)
LYMPHOCYTES NFR BLD AUTO: 9 % (ref 19.6–45.3)
MCH RBC QN AUTO: 30 PG (ref 26.6–33)
MCH RBC QN AUTO: 30 PG (ref 26.6–33)
MCHC RBC AUTO-ENTMCNC: 31.8 G/DL (ref 31.5–35.7)
MCHC RBC AUTO-ENTMCNC: 32.5 G/DL (ref 31.5–35.7)
MCV RBC AUTO: 92 FL (ref 79–97)
MCV RBC AUTO: 94.4 FL (ref 79–97)
MONOCYTES # BLD AUTO: 0.7 10*3/MM3 (ref 0.1–0.9)
MONOCYTES NFR BLD AUTO: 8 % (ref 5–12)
NEUTROPHILS NFR BLD AUTO: 6.85 10*3/MM3 (ref 1.7–7)
NEUTROPHILS NFR BLD AUTO: 78.7 % (ref 42.7–76)
NRBC BLD AUTO-RTO: 0 /100 WBC (ref 0–0.2)
PLATELET # BLD AUTO: 188 10*3/MM3 (ref 140–450)
PLATELET # BLD AUTO: 206 10*3/MM3 (ref 140–450)
PMV BLD AUTO: 10 FL (ref 6–12)
PMV BLD AUTO: 10.6 FL (ref 6–12)
POTASSIUM SERPL-SCNC: 5.1 MMOL/L (ref 3.5–5.2)
POTASSIUM SERPL-SCNC: 5.2 MMOL/L (ref 3.5–5.2)
PROT SERPL-MCNC: 7 G/DL (ref 6–8.5)
PROT SERPL-MCNC: 7.4 G/DL (ref 6–8.5)
QT INTERVAL: 410 MS
QTC INTERVAL: 388 MS
RBC # BLD AUTO: 4.14 10*6/MM3 (ref 4.14–5.8)
RBC # BLD AUTO: 4.5 10*6/MM3 (ref 4.14–5.8)
SODIUM SERPL-SCNC: 138 MMOL/L (ref 136–145)
SODIUM SERPL-SCNC: 143 MMOL/L (ref 136–145)
TRIGL SERPL-MCNC: 119 MG/DL (ref 0–150)
TSH SERPL DL<=0.05 MIU/L-ACNC: 1.36 UIU/ML (ref 0.27–4.2)
VIT B12 BLD-MCNC: 499 PG/ML (ref 211–946)
VLDLC SERPL-MCNC: 22 MG/DL (ref 5–40)
WBC NRBC COR # BLD: 7.4 10*3/MM3 (ref 3.4–10.8)
WBC NRBC COR # BLD: 8.7 10*3/MM3 (ref 3.4–10.8)
WHOLE BLOOD HOLD COAG: NORMAL
WHOLE BLOOD HOLD SPECIMEN: NORMAL

## 2022-01-01 PROCEDURE — 90662 IIV NO PRSV INCREASED AG IM: CPT | Performed by: INTERNAL MEDICINE

## 2022-01-01 PROCEDURE — 85025 COMPLETE CBC W/AUTO DIFF WBC: CPT | Performed by: NURSE PRACTITIONER

## 2022-01-01 PROCEDURE — 99214 OFFICE O/P EST MOD 30 MIN: CPT | Performed by: INTERNAL MEDICINE

## 2022-01-01 PROCEDURE — 93005 ELECTROCARDIOGRAM TRACING: CPT | Performed by: NURSE PRACTITIONER

## 2022-01-01 PROCEDURE — 82607 VITAMIN B-12: CPT

## 2022-01-01 PROCEDURE — 80053 COMPREHEN METABOLIC PANEL: CPT | Performed by: NURSE PRACTITIONER

## 2022-01-01 PROCEDURE — 80061 LIPID PANEL: CPT

## 2022-01-01 PROCEDURE — 84443 ASSAY THYROID STIM HORMONE: CPT

## 2022-01-01 PROCEDURE — 85027 COMPLETE CBC AUTOMATED: CPT

## 2022-01-01 PROCEDURE — 99283 EMERGENCY DEPT VISIT LOW MDM: CPT

## 2022-01-01 PROCEDURE — 80053 COMPREHEN METABOLIC PANEL: CPT

## 2022-01-01 PROCEDURE — G0008 ADMIN INFLUENZA VIRUS VAC: HCPCS | Performed by: INTERNAL MEDICINE

## 2022-01-01 RX ORDER — TEMAZEPAM 7.5 MG/1
7.5 CAPSULE ORAL NIGHTLY PRN
Qty: 30 CAPSULE | Refills: 2 | Status: SHIPPED | OUTPATIENT
Start: 2022-01-01 | End: 2022-01-01 | Stop reason: SDUPTHER

## 2022-01-01 RX ORDER — SODIUM CHLORIDE 0.9 % (FLUSH) 0.9 %
10 SYRINGE (ML) INJECTION AS NEEDED
Status: DISCONTINUED | OUTPATIENT
Start: 2022-01-01 | End: 2022-01-01 | Stop reason: HOSPADM

## 2022-01-01 RX ORDER — DULOXETIN HYDROCHLORIDE 60 MG/1
CAPSULE, DELAYED RELEASE ORAL
Qty: 90 CAPSULE | Refills: 0 | Status: SHIPPED | OUTPATIENT
Start: 2022-01-01

## 2022-01-01 RX ORDER — EZETIMIBE 10 MG/1
TABLET ORAL
Qty: 90 TABLET | Refills: 0 | Status: SHIPPED | OUTPATIENT
Start: 2022-01-01 | End: 2022-01-01

## 2022-01-01 RX ORDER — TEMAZEPAM 7.5 MG/1
CAPSULE ORAL
Qty: 60 CAPSULE | Refills: 2
Start: 2022-01-01 | End: 2022-01-01 | Stop reason: SDUPTHER

## 2022-01-01 RX ORDER — DULOXETIN HYDROCHLORIDE 60 MG/1
CAPSULE, DELAYED RELEASE ORAL
Qty: 90 CAPSULE | Refills: 0 | Status: SHIPPED | OUTPATIENT
Start: 2022-01-01 | End: 2022-01-01

## 2022-01-01 RX ORDER — DULOXETIN HYDROCHLORIDE 60 MG/1
CAPSULE, DELAYED RELEASE ORAL
Qty: 90 CAPSULE | Refills: 3 | Status: SHIPPED | OUTPATIENT
Start: 2022-01-01 | End: 2022-01-01

## 2022-01-01 RX ORDER — FLUTICASONE FUROATE, UMECLIDINIUM BROMIDE AND VILANTEROL TRIFENATATE 200; 62.5; 25 UG/1; UG/1; UG/1
POWDER RESPIRATORY (INHALATION)
Qty: 60 EACH | Refills: 0 | Status: SHIPPED | OUTPATIENT
Start: 2022-01-01

## 2022-01-01 RX ORDER — AMLODIPINE BESYLATE 2.5 MG/1
TABLET ORAL
Qty: 90 TABLET | Refills: 0 | Status: SHIPPED | OUTPATIENT
Start: 2022-01-01

## 2022-01-01 RX ORDER — AMLODIPINE BESYLATE 2.5 MG/1
TABLET ORAL
Qty: 90 TABLET | Refills: 1 | Status: SHIPPED | OUTPATIENT
Start: 2022-01-01 | End: 2022-01-01

## 2022-01-01 RX ORDER — EZETIMIBE 10 MG/1
TABLET ORAL
Qty: 90 TABLET | Refills: 0 | Status: SHIPPED | OUTPATIENT
Start: 2022-01-01 | End: 2023-01-03

## 2022-01-01 RX ORDER — TEMAZEPAM 7.5 MG/1
CAPSULE ORAL
Qty: 45 CAPSULE | Refills: 2 | Status: SHIPPED | OUTPATIENT
Start: 2022-01-01

## 2022-01-01 RX ORDER — AMLODIPINE BESYLATE 2.5 MG/1
TABLET ORAL
Qty: 90 TABLET | Refills: 0 | Status: SHIPPED | OUTPATIENT
Start: 2022-01-01 | End: 2022-01-01

## 2022-02-01 NOTE — TELEPHONE ENCOUNTER
Pt states that he needs a referral to Ortho for right hip pain - hurt ankle in November - sprain - still hurting - has been limping - thinks this might be the cause.    [phone] cell if patient needs to be called     Called and will arrange f/u with ortho

## 2022-02-22 NOTE — PROGRESS NOTES
Patient is a 86 y.o. male who is here for a follow up of hyperlipidemia and hypertension.  Chief Complaint   Patient presents with   • Hyperlipidemia   • Hypertension         HPI:    Here for mgmt of HTN and hyperlipidemia and CKD.  Having issues with continued left hip pain.  Did not go see ortho last week.  Using tylenol for hip pain.  BP is good.  No abdominal pains.  Breathing ok.      History:     Patient Active Problem List   Diagnosis   • Anxiety   • Aortic aneurysm (HCC)   • Atherosclerotic heart disease of native coronary artery without angina pectoris   • Chronic obstructive pulmonary disease (HCC)   • Chronic kidney disease, stage III (moderate) (HCC)   • Diverticulosis of large intestine   • Enlarged prostate without lower urinary tract symptoms (luts)   • Generalized osteoarthritis   • Hyperlipidemia   • Hypertension   • Insomnia   • Neuropathy   • PVD (peripheral vascular disease) ([facility])   • Tubular adenoma   • Absence of kidney   • Arthralgia of hip   • Lower urinary tract symptoms (LUTS)   • Routine general medical examination at a health care facility   • Multiple falls   • Acute UTI (urinary tract infection)   • Abdominal aortic aneurysm (AAA) without rupture ([facility])       Past Medical History:   Diagnosis Date   • Arthritis    • Colon polyps    • COPD (chronic obstructive pulmonary disease) (HCC)    • Ecchymosis    • Elevated cholesterol    • GERD (gastroesophageal reflux disease)    • Heart attack (HCC)    • Hypertension    • Kidney stones    • Peripheral vascular disease (HCC)    • Wears glasses        Past Surgical History:   Procedure Laterality Date   • BACK SURGERY      has a lump removed/not cancer   • CARDIAC CATHETERIZATION  2010    cardiac stent times one, patient states that 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   • COLONOSCOPY     • CORONARY STENT PLACEMENT     • CYSTOSCOPY TRANSURETHRAL RESECTION OF PROSTATE N/A 10/27/2020    Procedure: TRANSURETHRAL  RESECTION OF PROSTATE;  Surgeon: Gabriel Echevarria MD;  Location: [facility];  Service: Urology;  Laterality: N/A;   • ROTATOR CUFF REPAIR Right    • SINUS SURGERY     • TONSILLECTOMY         Current Outpatient Medications on File Prior to Visit   Medication Sig   • amLODIPine (NORVASC) 2.5 MG tablet TAKE ONE TABLET BY MOUTH EVERY NIGHT   • aspirin 81 MG tablet Take 81 mg by mouth Daily.   • carvedilol (COREG) 12.5 MG tablet Take 12.5 mg by mouth 2 (Two) Times a Day.   • clonazePAM (KlonoPIN) 0.5 MG tablet TAKE 1 TO 2 TABLETS BY MOUTH AT BEDTIME   • docusate sodium (Colace) 100 MG capsule Take 1 capsule by mouth 2 (Two) Times a Day. If taking percocet   • ezetimibe (ZETIA) 10 MG tablet TAKE ONE TABLET BY MOUTH EVERY DAY **CONTINUE CRESTOR   • paricalcitol (ZEMPLAR) 1 MCG capsule Monday, Wednesday and Friday   • Trelegy Ellipta 200-62.5-25 MCG/INH inhaler INHALE 1 PUFF BY MOUTH EVERY MORNING   • [DISCONTINUED] diclofenac (VOLTAREN) 1 % gel gel APPLY GEL TOPICALLY TO APPROPRIATE AREA AS DIRECTED   • [DISCONTINUED] Diclofenac Sodium (VOLTAREN) 1 % gel gel APPLY 4 GRAMS TO AFFECTED AREA 2 TIMES A DAY   • [DISCONTINUED] DULoxetine (CYMBALTA) 60 MG capsule TAKE ONE CAPSULE BY MOUTH EVERY DAY   • [DISCONTINUED] predniSONE (DELTASONE) 10 MG (48) dose pack As directed     No current facility-administered medications on file prior to visit.       Family History   Problem Relation Age of Onset   • Cancer Other    • Heart attack Other    • Hypertension Other    • Hyperlipidemia Other    • Stroke Mother    • Colon cancer Father    • Heart attack Father    • Hypertension Father        Social History     Socioeconomic History   • Marital status:    • Number of children: 2   Tobacco Use   • Smoking status: Former Smoker     Packs/day: 1.00     Years: 30.00     Pack years: 30.00     Types: Cigarettes     Quit date:      Years since quittin   • Smokeless tobacco: Never Used   Vaping Use   • Vaping Use: Never  "used   Substance and Sexual Activity   • Alcohol use: Never   • Drug use: Never   • Sexual activity: Defer         Review of Systems   Constitutional: Negative for chills, diaphoresis, fever and unexpected weight change.   HENT: Negative for congestion, ear pain, hearing loss, nosebleeds, postnasal drip, sinus pressure and sore throat.    Eyes: Negative for pain, discharge and itching.   Respiratory: Positive for shortness of breath. Negative for cough, chest tightness and wheezing.    Cardiovascular: Negative for chest pain, palpitations and leg swelling.   Gastrointestinal: Negative for abdominal distention, abdominal pain, blood in stool, constipation, diarrhea, nausea and vomiting.        5/15 colonoscopy with TA  7/18 \" \"   Endocrine: Negative for polydipsia and polyuria.   Genitourinary: Negative for difficulty urinating, dysuria, frequency and hematuria.        Followed by urology   Musculoskeletal: Positive for arthralgias (in hips) and back pain. Negative for gait problem, joint swelling and myalgias.   Skin: Negative for rash and wound.   Neurological: Negative for dizziness, syncope and headaches.   Psychiatric/Behavioral: Positive for sleep disturbance. Negative for dysphoric mood. The patient is not nervous/anxious.        /70   Pulse 68   Temp 96.8 °F (36 °C) (Infrared)   Ht 175.3 cm (69.02\")   Wt 81.6 kg (180 lb)   SpO2 98%   BMI 26.57 kg/m²       Physical Exam  Constitutional:       Appearance: Normal appearance. He is well-developed.   HENT:      Head: Normocephalic and atraumatic.      Right Ear: External ear normal.      Left Ear: External ear normal.      Nose: Nose normal.      Mouth/Throat:      Mouth: Mucous membranes are moist.      Pharynx: Oropharynx is clear.   Eyes:      Extraocular Movements: Extraocular movements intact.      Conjunctiva/sclera: Conjunctivae normal.      Pupils: Pupils are equal, round, and reactive to light.   Cardiovascular:      Rate and Rhythm: Normal " rate and regular rhythm.      Heart sounds: Normal heart sounds.   Pulmonary:      Effort: Pulmonary effort is normal.      Comments: Diminished BS  Abdominal:      General: Bowel sounds are normal.      Palpations: Abdomen is soft.   Musculoskeletal:         General: Normal range of motion.      Cervical back: Normal range of motion and neck supple.   Lymphadenopathy:      Cervical: No cervical adenopathy.   Skin:     General: Skin is warm and dry.   Neurological:      General: No focal deficit present.      Mental Status: He is alert and oriented to person, place, and time.   Psychiatric:         Mood and Affect: Mood normal.         Behavior: Behavior normal.         Thought Content: Thought content normal.         Procedure:      Discussion/Summary:    htn-controlled on CCB and BB, goal of 150/80  Cri-labs soon, advised adequate hydration and NSAID avoidance  copd-improved on Trelegy  hyperlipidemia-labs soon on Crestor/Zetia, counseled on diet and wt loss  bph-Urology f/u , s/p TURP  diverticulosis-citrucel qd , asymptomatic  TA-colonoscopy noted, needs scope 7/23  cad/pvd-cont rf mod, asymptomatic  insomnia-cont klonopine qhs prn, advised of risk  djd/neuropathy-cont cymbalta and gabapentin, f/u Dr Garduno, stable for the most part  Diverticulosis-counseled on increased fiber      10/12 labs noted and dw patient    Current Outpatient Medications:   •  amLODIPine (NORVASC) 2.5 MG tablet, TAKE ONE TABLET BY MOUTH EVERY NIGHT, Disp: 90 tablet, Rfl: 0  •  aspirin 81 MG tablet, Take 81 mg by mouth Daily., Disp: , Rfl:   •  carvedilol (COREG) 12.5 MG tablet, Take 12.5 mg by mouth 2 (Two) Times a Day., Disp: , Rfl: 0  •  clonazePAM (KlonoPIN) 0.5 MG tablet, TAKE 1 TO 2 TABLETS BY MOUTH AT BEDTIME, Disp: 60 tablet, Rfl: 2  •  Diclofenac Sodium (VOLTAREN) 1 % gel gel, APPLY 4 GRAMS TO AFFECTED AREA 2 TIMES A DAY, Disp: 100 g, Rfl: 0  •  docusate sodium (Colace) 100 MG capsule, Take 1 capsule by mouth 2 (Two) Times a Day.  If taking percocet, Disp: 15 capsule, Rfl: 1  •  DULoxetine (CYMBALTA) 60 MG capsule, TAKE ONE CAPSULE BY MOUTH EVERY DAY, Disp: 90 capsule, Rfl: 0  •  ezetimibe (ZETIA) 10 MG tablet, TAKE ONE TABLET BY MOUTH EVERY DAY **CONTINUE CRESTOR, Disp: 90 tablet, Rfl: 0  •  paricalcitol (ZEMPLAR) 1 MCG capsule, Monday, Wednesday and Friday, Disp: , Rfl: 0  •  Trelegy Ellipta 200-62.5-25 MCG/INH inhaler, INHALE 1 PUFF BY MOUTH EVERY MORNING, Disp: 60 each, Rfl: 0        Diagnoses and all orders for this visit:    1. Primary hypertension (Primary)  -     CBC (No Diff); Future    2. Other hyperlipidemia  -     Comprehensive Metabolic Panel; Future  -     Lipid Panel; Future  -     TSH; Future    3. Diverticulosis of large intestine without hemorrhage  -     Vitamin B12; Future    4. Enlarged prostate without lower urinary tract symptoms (luts)    5. Stage 3 chronic kidney disease, unspecified whether stage 3a or 3b CKD (HCC)    6. Other emphysema (HCC)

## 2022-03-29 NOTE — TELEPHONE ENCOUNTER
Caller: Asael Zimmer    Relationship: Self    Best call back number:[phone]    What medications are you currently taking:   Current Outpatient Medications on File Prior to Visit   Medication Sig Dispense Refill   • amLODIPine (NORVASC) 2.5 MG tablet TAKE ONE TABLET BY MOUTH EVERY NIGHT 90 tablet 0   • aspirin 81 MG tablet Take 81 mg by mouth Daily.     • carvedilol (COREG) 12.5 MG tablet Take 12.5 mg by mouth 2 (Two) Times a Day.  0   • clonazePAM (KlonoPIN) 0.5 MG tablet TAKE 1 TO 2 TABLETS BY MOUTH AT BEDTIME 60 tablet 2   • Diclofenac Sodium (VOLTAREN) 1 % gel gel APPLY 4 GRAMS TO AFFECTED AREA 2 TIMES A  g 0   • docusate sodium (Colace) 100 MG capsule Take 1 capsule by mouth 2 (Two) Times a Day. If taking percocet 15 capsule 1   • DULoxetine (CYMBALTA) 60 MG capsule TAKE ONE CAPSULE BY MOUTH EVERY DAY 90 capsule 0   • ezetimibe (ZETIA) 10 MG tablet TAKE ONE TABLET BY MOUTH EVERY DAY **CONTINUE CRESTOR 90 tablet 0   • paricalcitol (ZEMPLAR) 1 MCG capsule Monday, Wednesday and Friday  0   • temazepam (RESTORIL) 7.5 MG capsule Take 1 capsule by mouth At Night As Needed for Sleep. Stop klonopine 30 capsule 2   • Trelegy Ellipta 200-62.5-25 MCG/INH inhaler INHALE 1 PUFF BY MOUTH EVERY MORNING 60 each 0     No current facility-administered medications on file prior to visit.        Which medication are you concerned about: TEMAZEPAM 7.5    Who prescribed you this medication: DR HAWTHORNE    What are your concerns: ASAEL SAYS HIS PHARMACY CAN'T GET THE 7.5 MG BUT CAN GET THE 15 MG THAT IS LESS EXPENSIVE.  ASAEL SAYS THE 7.5 HASN'T WORKED ANYWAY.  WILL YOU ORDER THE 15 MG?       HE IS OUT OF THIS RX AND WOULD LIKE A 90 DAYS SUPPLY

## 2022-05-26 NOTE — TELEPHONE ENCOUNTER
Caller: Rylan Zimmer    Relationship: Self    Best call back number: [phone]    Requested Prescriptions:   Requested Prescriptions     Pending Prescriptions Disp Refills   • temazepam (RESTORIL) 7.5 MG capsule 30 capsule 2     Sig: Take 1 capsule by mouth At Night As Needed for Sleep. Stop [facility]        Pharmacy where request should be sent: [facility] [place] [address] [address]  - [phone]  - [phone] FX     Additional details provided by patient:     Does the patient have less than a 3 day supply:  [x] Yes  [] No    SiAngus Malave Rep   05/26/22 13:11 EDT

## 2022-06-29 NOTE — TELEPHONE ENCOUNTER
Pt called and stated that his kidney doctor got the results of his blood work back and they instructed him to go to the er. Pt remembered that he had labs done previously at this office and wanted to know the results of those.please call pt on his cell phone at [phone]

## 2022-07-20 NOTE — TELEPHONE ENCOUNTER
PT CALLED THE OFFICE AND REQUESTED A REFERRAL FOR RHEUMATOLOGY  AT THE [facility] TO SEE A  FOR ARTHRITIS IN BOTH OF HIS FEET, PLEASE ADVISE.

## 2022-07-22 NOTE — TELEPHONE ENCOUNTER
Caller: NaylorRylan    Relationship: Self    Best call back number: [phone]   Requested Prescriptions:   Requested Prescriptions     Pending Prescriptions Disp Refills   • temazepam (RESTORIL) 7.5 MG capsule 30 capsule 2     Sig: Take 1 capsule by mouth At Night As Needed for Sleep. Stop [facility]        Pharmacy where request should be sent: [facility] [address] - [phone]  - [phone] FX     Additional details provided by patient:     PATIENT IS REQUESTING AN INCREASE IN TABLETS DISPENSED DUE TO THE 7.5 MG  SEEMS TO NOT BE STRONG ENOUGH, HE IS HAVING TROUBLE SLEEPING, HE WANTS TO KNOW IF HE CAN GET MAYBE 10 EXTRA TABLETS PER MONTH    Does the patient have less than a 3 day supply:  [x] Yes  [] No    Angus SORIA Rep   07/22/22 11:08 EDT

## 2022-07-22 NOTE — TELEPHONE ENCOUNTER
Pharmacy Name: [facility] [place], [place] - [address]  - [phone]  - [phone]      Pharmacy representative name: JOCE     Pharmacy representative phone number: [phone]    What medication are you calling in regards to: temazepam (RESTORIL) 7.5 MG capsule    What question does the pharmacy have: PATIENT IS NOT TAKING MEDICATION AS PRESCRIBED.  PATIENT IS OUT OF MEDICATION.  PATIENT IS TRYING TO FILL EARLY.  JOCE STATED THAT IF DR. HAWTHORNE COULD CHANGE THE DIRECTIONS ON MEDICATION SHE CAN FILL THIS MEDICATION.  PATIENT IS TAKING ABOUT 4 EARLY THROUGHOUT THE MONTH.    Who is the provider that prescribed the medication: MARINE    Additional notes:

## 2022-10-21 NOTE — PROGRESS NOTES
Patient is a 86 y.o. male who is here for a follow up of hyperlipidemia and hypertension.  Chief Complaint   Patient presents with   • Hyperlipidemia   • Hypertension         HPI:    Here for mgmt of HTN and COPD.  BP has been good.  Breathing well.  Still having issues with hip.  No abdominal pains.  Sleeping well with Restoril.  No am grogginess.  Appetite is good.      History:     Patient Active Problem List   Diagnosis   • Anxiety   • Aortic aneurysm (HCC)   • Atherosclerotic heart disease of native coronary artery without angina pectoris   • Chronic obstructive pulmonary disease (HCC)   • Chronic kidney disease, stage III (moderate) (HCC)   • Diverticulosis of large intestine   • Enlarged prostate without lower urinary tract symptoms (luts)   • Generalized osteoarthritis   • Hyperlipidemia   • Hypertension   • Insomnia   • Neuropathy   • PVD (peripheral vascular disease) ([facility])   • Tubular adenoma   • Absence of kidney   • Arthralgia of hip   • Lower urinary tract symptoms (LUTS)   • Routine general medical examination at a health care facility   • Multiple falls   • Acute UTI (urinary tract infection)   • Abdominal aortic aneurysm (AAA) without rupture       Past Medical History:   Diagnosis Date   • Arthritis    • Colon polyps    • COPD (chronic obstructive pulmonary disease) (HCC)    • Ecchymosis    • Elevated cholesterol    • GERD (gastroesophageal reflux disease)    • Heart attack (HCC)    • Hypertension    • Kidney stones    • Peripheral vascular disease (HCC)    • Wears glasses        Past Surgical History:   Procedure Laterality Date   • BACK SURGERY      has a lump removed/not cancer   • CARDIAC CATHETERIZATION  2010    cardiac stent times one, patient states that 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   • COLONOSCOPY     • CORONARY STENT PLACEMENT     • CYSTOSCOPY TRANSURETHRAL RESECTION OF PROSTATE N/A 10/27/2020    Procedure: TRANSURETHRAL RESECTION OF PROSTATE;  Surgeon:  Gabriel Echevarria MD;  Location: [facility];  Service: Urology;  Laterality: N/A;   • ROTATOR CUFF REPAIR Right    • SINUS SURGERY     • TONSILLECTOMY         Current Outpatient Medications on File Prior to Visit   Medication Sig   • amLODIPine (NORVASC) 2.5 MG tablet TAKE ONE TABLET BY MOUTH EVERY NIGHT   • aspirin 81 MG tablet Take 81 mg by mouth Daily.   • carvedilol (COREG) 12.5 MG tablet Take 12.5 mg by mouth 2 (Two) Times a Day.   • clonazePAM (KlonoPIN) 0.5 MG tablet TAKE 1 TO 2 TABLETS BY MOUTH AT BEDTIME   • Diclofenac Sodium (VOLTAREN) 1 % gel gel APPLY 4 GRAMS TO AFFECTED AREA 2 TIMES A DAY   • docusate sodium (Colace) 100 MG capsule Take 1 capsule by mouth 2 (Two) Times a Day. If taking percocet   • DULoxetine (CYMBALTA) 60 MG capsule TAKE ONE CAPSULE BY MOUTH EVERY DAY   • ezetimibe (ZETIA) 10 MG tablet TAKE ONE TABLET BY MOUTH EVERY DAY **CONTINUE CRESTOR   • paricalcitol (ZEMPLAR) 1 MCG capsule Monday, Wednesday and Friday   • temazepam (RESTORIL) 7.5 MG capsule 1-2 at bedtime as need for sleep   • Trelegy Ellipta 200-62.5-25 MCG/INH inhaler INHALE 1 PUFF BY MOUTH EVERY MORNING     No current facility-administered medications on file prior to visit.       Family History   Problem Relation Age of Onset   • Cancer Other    • Heart attack Other    • Hypertension Other    • Hyperlipidemia Other    • Stroke Mother    • Colon cancer Father    • Heart attack Father    • Hypertension Father        Social History     Socioeconomic History   • Marital status:    • Number of children: 2   Tobacco Use   • Smoking status: Former     Packs/day: 1.00     Years: 30.00     Pack years: 30.00     Types: Cigarettes     Quit date:      Years since quittin.8   • Smokeless tobacco: Never   Vaping Use   • Vaping Use: Never used   Substance and Sexual Activity   • Alcohol use: Never   • Drug use: Never   • Sexual activity: Defer         Review of Systems   Constitutional: Negative for chills,  "diaphoresis, fever and unexpected weight change.   HENT: Negative for congestion, ear pain, hearing loss, nosebleeds, postnasal drip, sinus pressure and sore throat.    Eyes: Negative for pain, discharge and itching.   Respiratory: Positive for shortness of breath. Negative for cough, chest tightness and wheezing.    Cardiovascular: Negative for chest pain, palpitations and leg swelling.   Gastrointestinal: Negative for abdominal distention, abdominal pain, blood in stool, constipation, diarrhea, nausea and vomiting.        5/15 colonoscopy with TA  7/18 \" \"   Endocrine: Negative for polydipsia and polyuria.   Genitourinary: Negative for difficulty urinating, dysuria, frequency and hematuria.        Followed by urology   Musculoskeletal: Positive for arthralgias (in hips) and back pain. Negative for gait problem, joint swelling and myalgias.   Skin: Negative for rash and wound.   Neurological: Negative for dizziness, syncope and headaches.   Psychiatric/Behavioral: Positive for sleep disturbance. Negative for dysphoric mood. The patient is not nervous/anxious.        /70   Pulse 62   Temp 96.9 °F (36.1 °C) (Infrared)   Ht 175.3 cm (69.02\")   Wt 76.7 kg (169 lb)   SpO2 99%   BMI 24.95 kg/m²       Physical Exam  Constitutional:       Appearance: Normal appearance. He is well-developed.   HENT:      Head: Normocephalic and atraumatic.      Right Ear: External ear normal.      Left Ear: External ear normal.      Nose: Nose normal.      Mouth/Throat:      Mouth: Mucous membranes are moist.      Pharynx: Oropharynx is clear.   Eyes:      Extraocular Movements: Extraocular movements intact.      Conjunctiva/sclera: Conjunctivae normal.      Pupils: Pupils are equal, round, and reactive to light.   Cardiovascular:      Rate and Rhythm: Normal rate and regular rhythm.      Heart sounds: Normal heart sounds.   Pulmonary:      Effort: Pulmonary effort is normal.      Comments: Diminished BS  Abdominal:      " General: Bowel sounds are normal.      Palpations: Abdomen is soft.   Musculoskeletal:         General: Normal range of motion.      Cervical back: Normal range of motion and neck supple.   Lymphadenopathy:      Cervical: No cervical adenopathy.   Skin:     General: Skin is warm and dry.   Neurological:      General: No focal deficit present.      Mental Status: He is alert and oriented to person, place, and time.   Psychiatric:         Mood and Affect: Mood normal.         Behavior: Behavior normal.         Thought Content: Thought content normal.         Procedure:      Discussion/Summary:    htn-controlled on CCB and BB, goal of 150/80  Cri-labs noted, advised adequate hydration and NSAID avoidance  copd-improved on Trelegy  hyperlipidemia-labs noted on Crestor/Zetia, counseled on diet and wt loss  bph-Urology f/u , s/p TURP  diverticulosis-citrucel qd , asymptomatic  TA-colonoscopy noted, needs scope 7/23  cad/pvd-cont rf mod, asymptomatic   insomnia-cont klonopine qhs prn, advised of risk  djd/neuropathy-cont cymbalta and gabapentin, f/u Dr Garduno, stable for the most part  Diverticulosis-counseled on increased fiber      recent labs noted and dw patient       Current Outpatient Medications:   •  amLODIPine (NORVASC) 2.5 MG tablet, TAKE ONE TABLET BY MOUTH EVERY NIGHT, Disp: 90 tablet, Rfl: 1  •  aspirin 81 MG tablet, Take 81 mg by mouth Daily., Disp: , Rfl:   •  carvedilol (COREG) 12.5 MG tablet, Take 12.5 mg by mouth 2 (Two) Times a Day., Disp: , Rfl: 0  •  clonazePAM (KlonoPIN) 0.5 MG tablet, TAKE 1 TO 2 TABLETS BY MOUTH AT BEDTIME, Disp: 60 tablet, Rfl: 2  •  Diclofenac Sodium (VOLTAREN) 1 % gel gel, APPLY 4 GRAMS TO AFFECTED AREA 2 TIMES A DAY, Disp: 100 g, Rfl: 0  •  docusate sodium (Colace) 100 MG capsule, Take 1 capsule by mouth 2 (Two) Times a Day. If taking percocet, Disp: 15 capsule, Rfl: 1  •  DULoxetine (CYMBALTA) 60 MG capsule, TAKE ONE CAPSULE BY MOUTH EVERY DAY, Disp: 90 capsule, Rfl: 0  •   ezetimibe (ZETIA) 10 MG tablet, TAKE ONE TABLET BY MOUTH EVERY DAY **CONTINUE CRESTOR, Disp: 90 tablet, Rfl: 0  •  paricalcitol (ZEMPLAR) 1 MCG capsule, Monday, Wednesday and Friday, Disp: , Rfl: 0  •  temazepam (RESTORIL) 7.5 MG capsule, 1-2 at bedtime as need for sleep, Disp: 45 capsule, Rfl: 2  •  Trelegy Ellipta 200-62.5-25 MCG/INH inhaler, INHALE 1 PUFF BY MOUTH EVERY MORNING, Disp: 60 each, Rfl: 6        Diagnoses and all orders for this visit:    1. Other hyperlipidemia (Primary)    2. PVD (peripheral vascular disease) (HCC)    3. Primary hypertension    4. Diverticulosis of large intestine without hemorrhage    5. Stage 3 chronic kidney disease, unspecified whether stage 3a or 3b CKD (HCC)    6. Enlarged prostate without lower urinary tract symptoms (luts)    7. Tubular adenoma    8. Other emphysema (HCC)    9. Need for influenza vaccination  -     Fluzone High-Dose 65+yrs ([id number])

## 2022-12-13 NOTE — OUTREACH NOTE
Prep Survey    Flowsheet Row Responses   [facility] facility patient discharged from? Non-[facility]   Is LACE score < 7 ? Non-BH Discharge   Eligibility TCM [facility]   Date of Discharge 12/13/22   Discharge Disposition Home or Self Care   Discharge diagnosis HTN   Does the patient have one of the following disease processes/diagnoses(primary or secondary)? Other   Prep survey completed? Yes          KARMEN SANCHEZ - Registered Nurse

## 2022-12-14 NOTE — OUTREACH NOTE
Call Center TCM Note    Flowsheet Row Responses   [facility] facility patient discharged from? Non-   Does the patient have one of the following disease processes/diagnoses(primary or secondary)? Other   TCM attempt successful? Yes   Call start time 1224   Change in Health Status Moved to LTC/SNF/Hospice   Discharge diagnosis HTN   Is patient permission given to speak with other caregiver? Yes   List who call center can speak with Bess spouse    Person spoke with today (if not patient) and relationship Bess spouse           Destini Syed RN    12/14/2022, 12:25 EST

## 2023-01-03 RX ORDER — EZETIMIBE 10 MG/1
TABLET ORAL
Qty: 90 TABLET | Refills: 0 | Status: SHIPPED | OUTPATIENT
Start: 2023-01-03

## 2024-02-29 NOTE — PROGRESS NOTES
Chief Complaint   Patient presents with    [facility] Care    Annual Exam     Sandeep Vee is a 20 year old female who comes in today as a new patient for Preventive Physical.  She was previously under the care of pediatrician: Dr. Fatmata Boyle.    She tells me that she has been doing fairly well overall but has a couple of concerns.    She reports that over the last year, she will have random episodes of lightheadedness and dizziness, associated with increased heart rate and palpitations, and has had 2 episodes where she has passed out, it has been a while since she passed out, reports that she was just sitting when she experienced lightheadedness and passed out, not witnessed, just for a few seconds per patient.  She has not had any workup for this.    She also reports that over the last few weeks, she has been having some nausea after eating, sometimes feels like stomach is upset, has thrown up a couple of times  Denies any severe abdominal pain, diarrhea, constipation, blood in stools.  No reflux or heartburn symptoms reported.    ROS noticeable for regular menstrual periods, 5 days of bleeding, denies any heavy bleeding.  No prior diagnosis of anemia.    She works for [employer] as an .  No smoking, alcohol use or recreational drug use.    PMH, PSH, FH and SH reviewed and updated as necessary.    Past Medical History:   Diagnosis Date    Streptococcal tonsillitis     UTI (urinary tract infection)      Past Surgical History:   Procedure Laterality Date    NO PAST SURGERIES       Family History   Problem Relation Age of Onset    Patient is unaware of any medical problems Mother     Patient is unaware of any medical problems Father     Cancer Neg Hx      Social History     Tobacco Use    Smoking status: Never    Smokeless tobacco: Never   Vaping Use    Vaping Use: never used   Substance Use Topics    Alcohol use: No    Drug use: Never     ALLERGIES:  Patient has no known allergies.    Current  Chief Complaint  LUTS    HPI  Mr. Zimmer is a 84 y.o. male with history of HTN who presents with LUTS.  Primary symptom includes: weak stream  Patient denies dysuria.  Onset was >12 months ago.    Previous treatments include: dutasteride, tamsulosin  Formerly seen by Dr. Luna and was scheduled for TURP  He has had 3 negative prostate biopsies - last done at  possibly    no Constipation  no Diarrhea  no History of kidney stones    IPSS Questionnaire (AUA-7):  Over the past month…    1)  Incomplete Emptying  How often have you had a sensation of not emptying your bladder?  1 - Less than 1 time in 5   2)  Frequency  How often have you had to urinate less than every two hours? 1 - Less than 1 time in 5   3)  Intermittency  How often have you found you stopped and started again several times when you urinated?  1 - Less than 1 time in 5   4) Urgency  How often have you found it difficult to postpone urination?  4 - More than half the time   5) Weak Stream  How often have you had a weak urinary stream?  4 - More than half the time   6) Straining  How often have you had to push or strain to begin urination?  1 - Less than 1 time in 5   7) Nocturia  How many times did you typically get up at night to urinate?  2 - 2 times   Total Score:  14       Quality of life due to urinary symptoms:  If you were to spend the rest of your life with your urinary condition the way it is now, how would you feel about that? 3-Mixed   Urine Leakage (Incontinence) 0-No Leakage       Past Medical History  Past Medical History:   Diagnosis Date   • Colon polyps    • Ecchymosis    • Heart attack (CMS/HCC)        Past Surgical History  Past Surgical History:   Procedure Laterality Date   • ROTATOR CUFF REPAIR Right 2008   • TONSILLECTOMY         Medications    Current Outpatient Medications:   •  amLODIPine (NORVASC) 2.5 MG tablet, TAKE 1 TABLET BY MOUTH EVERY NIGHT, Disp: 90 tablet, Rfl: 3  •  aspirin 81 MG tablet, Take  by mouth Daily.,  Disp: , Rfl:   •  carvedilol (COREG) 12.5 MG tablet, 2 (Two) Times a Day., Disp: , Rfl: 0  •  clonazePAM (KlonoPIN) 0.5 MG tablet, TAKE 1 TO 2 TABLETS BY MOUTH EVERY NIGHT AT BEDTIME AS NEEDED, Disp: 60 tablet, Rfl: 2  •  CRESTOR 20 MG tablet, Every Night., Disp: , Rfl: 0  •  diclofenac (VOLTAREN) 1 % gel gel, APPLY GEL TOPICALLY TO APPROPRIATE AREA AS DIRECTED, Disp: 100 g, Rfl: 2  •  DULoxetine (CYMBALTA) 60 MG capsule, TAKE 1 CAPSULE BY MOUTH EVERY DAY, Disp: 90 capsule, Rfl: 1  •  dutasteride (AVODART) 0.5 MG capsule, Daily., Disp: , Rfl: 0  •  fluticasone (FLONASE) 50 MCG/ACT nasal spray, instill 1 spray into each nostril twice a day, Disp: 1 bottle, Rfl: 3  •  paricalcitol (ZEMPLAR) 1 MCG capsule, , Disp: , Rfl: 0  •  sulfaSALAzine (AZULFIDINE) 500 MG tablet, 2 (Two) Times a Day., Disp: , Rfl: 0  •  tamsulosin (FLOMAX) 0.4 MG capsule 24 hr capsule, TAKE ONE CAPSULE BY MOUTH TWICE DAILY, Disp: 180 capsule, Rfl: 2  •  TUDORZA PRESSAIR 400 MCG/ACT aerosol powder  powder for inhalation, INHALE 1 PUFF BY MOUTH TWICE DAILY, Disp: 1 each, Rfl: 3  •  zolpidem CR (AMBIEN CR) 12.5 MG CR tablet, Daily., Disp: , Rfl: 0    Allergies  Allergies   Allergen Reactions   • Influenza Virus Vaccine [Influenza Vaccine Live]    • Penicillins    • Quinine Derivatives        Social History  Social History     Socioeconomic History   • Marital status:      Spouse name: Not on file   • Number of children: Not on file   • Years of education: Not on file   • Highest education level: Not on file   Tobacco Use   • Smoking status: Former Smoker       Family History  He has no family history of prostate cancer    Review of Systems  Constitutional: No fevers or chills  Skin: Negative for rash  Endocrine: No heat/cold intolerance   Cardiovascular: Negative for chest pain or dyspnea on exertion  Respiratory: Negative for shortness of breath or wheezing  Gastrointestinal: No nausea or vomiting  Genitourinary: Negative for current gross  Medications    No medications on file     ROS:  Constitutional: Negative for fever and chills. Negative for unexplained weight change.  HEENT: Negative for eye drainage, pain or vision changes. Negative for ear pain or drainage. Negative for nasal congestion, rhinorrhea, sore throat.  Respiratory: Negative for cough,chest congestion, sputum, shortness of breath or wheezing  Cardiovascular: Negative for chest pain, chest pressure. Negative for leg swelling. + palpitations and dizziness, syncopal episodes as noted in HPI.  Gastrointestinal: See HPI.  Negative for abdominal pain, constipation or diarrhea. Negative for hematochezia or melena.  Genitourinary: Negative for dysuria, urgency, frequency, hematuria or flank pain. No abnormal vaginal bleeding or discharge.  Musculoskeletal: Neurologic: Negative for change in gait. Negative for new muscle or joint pains. Negative for change in sensory or motor function.  Negative for headache.  Endocrine: Negative for heat or cold intolerance, weight loss or gain.  Hematological: Negative for abnormal bleeding or bruising .  Skin: Negative for any worrisome lesions or rash.  Psychiatric: Negative for change in affect, change in mentation or sleep disturbance. Negative for SI or HI.    Visit Vitals  /64   Pulse 72   Temp 98 °F (36.7 °C)   Ht 5' 4\" (1.626 m)   Wt 67.1 kg (148 lb)   BMI 25.40 kg/m²     Physical Exam:  General: Normal appearing individual, well developed, well nourished female, in no acute distress.  Head:  Normocephalic-atraumatic.   Eyes: PERRLA. EOMI. Conjunctivae clear.    ENT:  EACs clear, TMs unremarkable.  Nasal exam reveals edematous turbinates with no drainage. Normal oral mucosa with no erythema or exudates.  Neck:  Trachea is midline. No thyromegaly. No adenopathy.   Cardiovascular:  Regular rate and rhythm without murmur. No S3 or S4 noted. No LE edema.  Respiratory:   Normal respiratory effort.  Clear to auscultation.  No wheezes, rales or  "hematuria.  Musculoskeletal: No flank pain  Neurological:  Negative for frequent headaches or dizziness  Lymph/Heme: Negative for leg swelling or calf pain.    Physical Exam  Visit Vitals  Ht 175.3 cm (69.02\")   Wt 73.5 kg (162 lb)   BMI 23.91 kg/m²     Constitutional: NAD, WDWN.   HEENT: NCAT. Conjunctivae normal.  MMM.    Cardiovascular: Regular rate.  Pulmonary/Chest: Respirations are even and non-labored bilaterally.  Abdominal: Soft. No distension, tenderness, masses or guarding. No CVA tenderness.  Neurological: A + O x 3.  Cranial Nerves II-XII grossly intact. Normal gait.  Extremities: FELTON x 4, Warm. No clubbing.  No cyanosis.    Skin: Pink, warm and dry.  No rashes noted.  Psychiatric:  Normal mood and affect    Genitourinary  Testicles descended bilaterally.  Uncircumcised phallus.  Normal external genital exam.    Labs  No results found for: PSA    Brief Urine Lab Results  (Last result in the past 365 days)      Color   Clarity   Blood   Leuk Est   Nitrite   Protein   CREAT   Urine HCG        08/24/20 1447 Yellow Clear Negative Moderate (2+) Negative Negative               PVR  Post-void residual performed by staff in the past- 0    Preprocedure diagnosis  Lower urinary tract symptoms    Postprocedure diagnosis  Same, occlusive prostate with median lobe    Procedure  Flexible Cystourethroscopy    Attending surgeon  Gabriel Echevarria MD    Anesthesia  2% lidocaine jelly intraurethrally    Complications  None    Indications  84 y.o. male undergoing a flexible cystoscopy for the above mentioned indications.  Informed consent was obtained.      Findings  Cystoscopic findings included one right and left ureteral orifice in the normal anatomic position with normal bladder mucosa and no tumors, masses or stones. The urethral urothelium was within normal limits with no strictures.  There was a prominent large median lobe with a dominant left sulcus.  The lateral lobes were large and medium length and " rhonchi.  Gastrointestinal:  Soft and nontender. No guarding or rebound tenderness.  No masses.  . Pelvic: Deferred  Musculoskeletal:  Neurologic:  Normal gait without ataxia. Normal strength and DTRs. No tremors.  Skin:  No worrisome rash or lesions noted.  Psychiatric:  AAOx 3. Normal mood and affect.  Normal insight and good judgment.    Orders Placed This Encounter    CBC with Automated Differential    Comprehensive Metabolic Panel    Lipid Panel Without Reflex    Thyroid Stimulating Hormone    TRANSTHORACIC ECHO(TTE) COMPLETE W/ W/O IMAGING AGENT    omeprazole (PriLOSEC) 40 MG capsule    Event or Mobile Cardiac Telemetry Monitor     ASSESSMENT AND PLAN:    Encounter for preventive health examination  (primary encounter diagnosis)  Screening for diabetes mellitus (DM)  Encounter for lipid screening for cardiovascular disease  Dizziness  Palpitation  Syncope  Dyspepsia  Nausea     Plan:    Preventive Health Measures reviewed.  Follow proper diet, engage in regular exercise and try to maintain a healthy weight.  Engage in at least 30 minutes to moderate level activity daily.  Goal BMI: Less than 25.  Screening Labs ordered including: Glucose, Lipids.  Immunizations reviewed: HPV vaccine was recommended, she received the first dose but has not completed the series, she is electing, VIS provided, patient can schedule if she decides to do so.  She is not interested in influenza or COVID vaccines  She is up-to-date on Tdap vaccine, and will need it in 2026.    Dizziness.  Palpitations.  Syncopal episode x 2.  She is hemodynamically stable.  Management plan reviewed with the patient.  She should drink plenty of fluids to maintain good hydration.  Labs requested including CBC, CMP and TSH.  Echocardiogram and Cardiac monitoring requested as part of further evaluation, further plan based on findings  If labs, echocardiogram and cardiac monitoring is nonrevealing, and symptoms persist, we will consult with  cardio.    Dyspepsia.  Nausea, differential includes gastritis.  Avoid possible triggers.  Plan trial of: Prilosec 40 mg 1 tablet daily before breakfast for the next 30 days.  Follow-up if there is no improvement or worsening of symptoms in which case we will plan further evaluation and management.    She verbalized understanding and is agreeable with the plan of care.  She will be notified of the results as they become available and further plan of care accordingly.    FOLLOW UP:  Return in about 1 year (around 2/28/2025) for Preventive Physical..                         obstructive in appearance.      Procedure  The patient was placed in supine position and prepped and draped in sterile fashion with lidocaine jelly per urethra for anesthesia.  A timeout was performed.  The 14F flexible cystoscope was lubricated and gently placed through the penile urethra and into the bladder.  The bladder was completely visualized.  The cystoscope was retroflexed and the bladder neck and prostate visualized.  The cystoscope was slowly withdrawn while visualizing the urethra and the procedure terminated.  The patient tolerated the procedure well.      TRUS OF PROSTATE    Preoperative diagnosis  Lower urinary tract symptoms    Postoperative diagnosis  Same    Procedure  1.  Transrectal ultrasound of the prostate    Attending Surgeon  Gabriel Echevarria MD    Anesthesia  2% lidocaine jelly, intrarectal instillation, 10mL    Complications  None    Specimen  None    Indications  Mr. Zimmer is a 84 y.o. male with lower urinary tract symptoms.  He presents for prostate ultrasound to evaluate prostate size prior to procedure.    Procedure  The patient was positioned and prepped in a left lateral position with lower extremities flexed.  Lidocaine jelly, 2%, was injected per rectum. A digital rectal exam was performed which demonstrated a large lumpy prostate without nodules or induration. The Sermo E8CS rectal ultrasound probe was slowly introduced into the rectum without difficulty.  The prostate and seminal vesicles were inspected systematically using cross and sagittal views with the ultrasound.  There were no hypoechoic areas within the prostate.  A median lobe was seen.  The dimensions of the prostate were measured t, for a calculated volume of 56.71 mL.  The seminal vesicles appeared normal.  The rectal ultrasound probe was removed.  The patient tolerated the procedure well.    Uroflow:  Peak flow rate - 5.3 mL/sec  Average flow rate - 3 mL/sec  Flow curve - long  Voided volume - 243 mL    I personally  reviewed  and interpreted this study.         Assessment  Mr. Zimmer is a 84 y.o. male who presents with LUTS, primarily weak stream and urgency. Urgency has improved now off medicine, but still has very weak stream. 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     We reviewed the risks, benefits, and alternatives to TURP. He voiced his understanding and wishes to proceed.       Plan  1.  Schedule for TURP 10/27/20  2.  Cardiac eval/clearance prior        Gabriel Echevarria MD

## (undated) DEVICE — 24FR BIPLR COAG ELECTRDE, STERILE: Brand: N.A.

## (undated) DEVICE — CUTTING LOOP, BIPOLAR, 24/26 FR.: Brand: N.A.

## (undated) DEVICE — SOL IRR NACL 0.9PCT 3000ML

## (undated) DEVICE — ST FLD IRR WARM

## (undated) DEVICE — IRRIGATOR TOOMEY 70CC

## (undated) DEVICE — GLV SURG SENSICARE LT W/ALOE PF LF 7 STRL

## (undated) DEVICE — RICH CYSTO: Brand: MEDLINE INDUSTRIES, INC.

## (undated) DEVICE — CUFF SCD HEMOFORCE SEQ CALF STD MD

## (undated) DEVICE — CATHETER,FOLEY,COUDE,LATEX,18FR,10ML: Brand: MEDLINE